# Patient Record
Sex: MALE | Race: WHITE | NOT HISPANIC OR LATINO | Employment: FULL TIME | URBAN - METROPOLITAN AREA
[De-identification: names, ages, dates, MRNs, and addresses within clinical notes are randomized per-mention and may not be internally consistent; named-entity substitution may affect disease eponyms.]

---

## 2017-01-03 ENCOUNTER — MYC REFILL (OUTPATIENT)
Dept: PALLIATIVE MEDICINE | Facility: CLINIC | Age: 42
End: 2017-01-03

## 2017-01-03 DIAGNOSIS — M62.830 BACK MUSCLE SPASM: ICD-10-CM

## 2017-01-06 RX ORDER — METHOCARBAMOL 500 MG/1
500-1000 TABLET, FILM COATED ORAL 3 TIMES DAILY PRN
Qty: 60 TABLET | Refills: 3 | Status: SHIPPED | OUTPATIENT
Start: 2017-01-06 | End: 2018-08-28

## 2017-01-06 NOTE — TELEPHONE ENCOUNTER
Last refilled on 10/10/2016 with 3 refills    Pt last seen on 11/30/2016 injection; 10/10/2016 OV  Next appt scheduled for NONE    Will facilitate refill.

## 2017-01-06 NOTE — TELEPHONE ENCOUNTER
Message from [employer]:  Original authorizing provider: Love Marte MD    Doug AILEEN Grayson would like a refill of the following medications:  methocarbamol (ROBAXIN) 500 MG tablet [Love Marte MD]    Preferred pharmacy: [id number] - [place], [place] - [address]    Comment:  Trying to be on safe side with lower back. I am back to trying to be in the gym 4 days a week, how ever if my back muscles start to act up....advil and such does zero to help chill them out. Hope holidays were amazing on your end TJ

## 2017-01-06 NOTE — TELEPHONE ENCOUNTER
Signed Prescriptions:                        Disp   Refills    methocarbamol (ROBAXIN) 500 MG tablet      60 tab*3        Sig: Take 1-2 tablets (500-1,000 mg) by mouth 3 times           daily as needed for muscle spasms  Authorizing Provider: JULIANE MARROQUIN MD  [facility]

## 2017-01-08 ENCOUNTER — MYC REFILL (OUTPATIENT)
Dept: PALLIATIVE MEDICINE | Facility: CLINIC | Age: 42
End: 2017-01-08

## 2017-01-08 DIAGNOSIS — M53.3 SI (SACROILIAC) JOINT DYSFUNCTION: ICD-10-CM

## 2017-01-09 NOTE — TELEPHONE ENCOUNTER
Routed to the nursing pool and to the MA pool to process refill(s).    Karlie Sykes, RN-BSN  [facility]

## 2017-01-09 NOTE — TELEPHONE ENCOUNTER
Message from [employer]:  Original authorizing provider: MD Doug Lea would like a refill of the following medications:  traMADol (ULTRAM) 50 MG tablet [Love Marte MD]    Preferred pharmacy: [id number] - [place], [place] - [address] [address]    Comment:  As always being proactive.

## 2017-01-11 ENCOUNTER — MYC MEDICAL ADVICE (OUTPATIENT)
Dept: PALLIATIVE MEDICINE | Facility: CLINIC | Age: 42
End: 2017-01-11

## 2017-01-11 RX ORDER — TRAMADOL HYDROCHLORIDE 50 MG/1
50-100 TABLET ORAL EVERY 6 HOURS PRN
Qty: 120 TABLET | Refills: 0 | Status: SHIPPED | OUTPATIENT
Start: 2017-01-11 | End: 2017-02-07

## 2017-01-11 NOTE — TELEPHONE ENCOUNTER
Received call from patient requesting refill(s) of tramadol      Last picked up from pharmacy on 16  Last due:  Fill on/after 16 to start on 16  Due date 17    Pt last seen on 10/10/16  Next appt scheduled for none    Last urine drug screen none  Current opioid agreement on file No Date:     Processing (pick one):      Fax to [facility]     Karlie Sykes RN-BSN  [facility]

## 2017-01-11 NOTE — TELEPHONE ENCOUNTER
Faxed signed script for Tramadol to [facility]. RightFax confirmed. Informed patient via [employer] message.    Aditya Fernandes MA

## 2017-01-11 NOTE — TELEPHONE ENCOUNTER
See the 1/8/17 refill encounter for more information.    Karlie Sykes RN-BSN  [facility]

## 2017-01-11 NOTE — TELEPHONE ENCOUNTER
Cannot find if Dr. Johnston communicated with me if he will take over fills.  Will send separate staff message    Signed Prescriptions:                        Disp   Refills    traMADol (ULTRAM) 50 MG tablet             120 ta*0        Sig: Take 1-2 tablets ( mg) by mouth every 6 hours           as needed for moderate pain (max of 4 per day.)           30 day supply. Okay to fill now.  To start on           1/13/17  Authorizing Provider: JULIANE MARROQUIN MD  [facility]

## 2017-02-07 ENCOUNTER — MYC REFILL (OUTPATIENT)
Dept: PALLIATIVE MEDICINE | Facility: CLINIC | Age: 42
End: 2017-02-07

## 2017-02-07 DIAGNOSIS — M53.3 SI (SACROILIAC) JOINT DYSFUNCTION: ICD-10-CM

## 2017-02-07 RX ORDER — TRAMADOL HYDROCHLORIDE 50 MG/1
50-100 TABLET ORAL EVERY 6 HOURS PRN
Qty: 120 TABLET | Refills: 0 | Status: SHIPPED | OUTPATIENT
Start: 2017-02-07 | End: 2017-02-23

## 2017-02-07 NOTE — TELEPHONE ENCOUNTER
Received call from patient requesting refill(s) of traMADol (ULTRAM) 50 MG tablet    Last picked up from pharmacy on 1/12/17    Processing (pick one and delete the others):  Mail to [id number] - [place], [place] - [address] [address] [place]  [facility]

## 2017-02-07 NOTE — TELEPHONE ENCOUNTER
If i'm going to be taking over everything now on patient - need to be seen to establish pain contract in next month please notify - thanks. Ryan Johnston MD ok fax prescription

## 2017-02-07 NOTE — TELEPHONE ENCOUNTER
Medication refill information reviewed.     Last due:  Okay to fill now.  To start on 1/13/17    Due date:  2/12/17    Weaning instructions:  N/A    Prescriptions prepped for review.     Karlie Sykes RN-BSN  [facility]

## 2017-02-07 NOTE — TELEPHONE ENCOUNTER
Routed to the nursing pool and to the MA pool to process refill(s).    Karlie Sykes, RN-BSN  [facility]

## 2017-02-07 NOTE — TELEPHONE ENCOUNTER
Have discussed with Dr. Johnston about taking over prescribing.  Will send this to him to sign.    Pending Prescriptions:                       Disp   Refills    traMADol (ULTRAM) 50 MG tablet             120 ta*0        Sig: Take 1-2 tablets ( mg) by mouth every 6 hours           as needed for moderate pain (max of 4 per day.)           30 day supply. Dispense on/after 2/10/17. To           start on 2/12/17        Doug,  I am sending this refill to Dr. Johnston, as we will transition. I'll keep an eye on things to make sure he is around to sign. I am happy to see you if you have a new concern or need an injection, but he will do the routine prescribing after this.    Thanks!  Radha Marte MD  [facility]

## 2017-02-07 NOTE — TELEPHONE ENCOUNTER
Message from [employer]ria:  Original authorizing provider: Love Marte MD    Doug Galicia would like a refill of the following medications:  traMADol (ULTRAM) 50 MG tablet [Love Marte MD]    Preferred pharmacy: [id number] - [place], [place] - [address] [address]    Comment:  Waiting to see if I have any changes, low back area giving a small amount of issues. How ever It seems my RIGHT knee is having issues now. My left knee has a tear inside but has been mostly ok. I had a tear fixed like 7-9 years back, that knee is acting the way it did back then. That is a new issue in past few days. Just to keep my doc up to speed.

## 2017-02-23 ENCOUNTER — OFFICE VISIT (OUTPATIENT)
Dept: FAMILY MEDICINE | Facility: CLINIC | Age: 42
End: 2017-02-23
Payer: COMMERCIAL

## 2017-02-23 VITALS
SYSTOLIC BLOOD PRESSURE: 136 MMHG | TEMPERATURE: 98.4 F | WEIGHT: 233 LBS | HEART RATE: 113 BPM | OXYGEN SATURATION: 99 % | DIASTOLIC BLOOD PRESSURE: 86 MMHG | BODY MASS INDEX: 30.88 KG/M2 | HEIGHT: 73 IN

## 2017-02-23 DIAGNOSIS — G47.00 INSOMNIA, UNSPECIFIED TYPE: Chronic | ICD-10-CM

## 2017-02-23 DIAGNOSIS — G89.4 CHRONIC PAIN SYNDROME: ICD-10-CM

## 2017-02-23 DIAGNOSIS — F33.0 MAJOR DEPRESSIVE DISORDER, RECURRENT EPISODE, MILD (H): ICD-10-CM

## 2017-02-23 DIAGNOSIS — M53.3 SI (SACROILIAC) JOINT DYSFUNCTION: Primary | ICD-10-CM

## 2017-02-23 PROCEDURE — 99214 OFFICE O/P EST MOD 30 MIN: CPT | Performed by: FAMILY MEDICINE

## 2017-02-23 RX ORDER — TRAMADOL HYDROCHLORIDE 50 MG/1
50-100 TABLET ORAL EVERY 6 HOURS PRN
Qty: 120 TABLET | Refills: 5 | Status: SHIPPED | OUTPATIENT
Start: 2017-02-23 | End: 2017-08-15

## 2017-02-23 ASSESSMENT — ANXIETY QUESTIONNAIRES
2. NOT BEING ABLE TO STOP OR CONTROL WORRYING: NOT AT ALL
7. FEELING AFRAID AS IF SOMETHING AWFUL MIGHT HAPPEN: NOT AT ALL
1. FEELING NERVOUS, ANXIOUS, OR ON EDGE: NOT AT ALL
5. BEING SO RESTLESS THAT IT IS HARD TO SIT STILL: NOT AT ALL
6. BECOMING EASILY ANNOYED OR IRRITABLE: SEVERAL DAYS
GAD7 TOTAL SCORE: 2
3. WORRYING TOO MUCH ABOUT DIFFERENT THINGS: NOT AT ALL

## 2017-02-23 ASSESSMENT — PATIENT HEALTH QUESTIONNAIRE - PHQ9: 5. POOR APPETITE OR OVEREATING: SEVERAL DAYS

## 2017-02-23 NOTE — PROGRESS NOTES
"SUBJECTIVE:  Doug Galicia, a 41 year old male scheduled an appointment to discuss the following issues:  Follow-up chronic low back pain (was seeing pain specialist and getting tramadol). History insomnia and depression.   SIjoint shots helpful. Epidurals ok in past. Mainly SIjoint.   Doing p.t. Exercises. Ice and some heat. No bm/bladder changes. No fevers or chills. No leg weakness. Sleep ok ambien. No major snoring.   No regular ALCOHOL or illicit drugs.   . No SUICIAL IDEATION OR HOMOCIDAL IDEATION OR THEE. Emotionally ok. cymbalta helpful. Some caffeine. No nausea, vomiting or diarrhea.     Past Medical History   Diagnosis Date     NONSPECIFIC MEDICAL HISTORY      Toretts Syndrome       Past Surgical History   Procedure Laterality Date     Arthroscopy knee rt/lt       right     C nonspecific procedure       Testicular Surgery       Family History   Problem Relation Age of Onset     Neurologic Disorder Father      seizures     DIABETES No family hx of      Colon Cancer Mother       in , diagnosed early 50s     Coronary Artery Disease No family hx of        Social History   Substance Use Topics     Smoking status: Former Smoker     Smokeless tobacco: Current User     Types: Chew      Comment: \"years\" per pt. did not know date     Alcohol use No       ROS:    OBJECTIVE:  /86  Pulse 113  Temp 98.4  F (36.9  C) (Oral)  Ht 6' 1\" (1.854 m)  Wt 233 lb (105.7 kg)  SpO2 99%  BMI 30.74 kg/m2  EXAM:  GENERAL APPEARANCE: healthy, alert and no distress  NECK: no adenopathy, no asymmetry, masses, or scars and thyroid normal to palpation  RESP: lungs clear to auscultation - no rales, rhonchi or wheezes  CV: regular rates and rhythm, normal S1 S2, no S3 or S4 and no murmur, click or rub -  ABDOMEN:  soft, nontender, no HSM or masses and bowel sounds normal  MS: extremities normal- no gross deformities noted, no evidence of inflammation in joints, FROM in all extremities.  MS: bilateral SIjoint " tenderness  SKIN: no suspicious lesions or rashes  NEURO: Normal strength and tone, sensory exam grossly normal, mentation intact and speech normal  PSYCH: mentation appears normal and affect normal/bright  PSYCH: mildly anxious  ASSESSMENT / PLAN:  (M53.3) SI (sacroiliac) joint dysfunction  (primary encounter diagnosis)  Comment: stable  Plan: traMADol (ULTRAM) 50 MG tablet        Prn shot. Reveiwed risks and side effects of medication  Pain contract signed. Recheck in 6 months  Sooner if worse    (F33.0) Major depressive disorder, recurrent episode, mild (H)  Comment: stable  Plan: DEPRESSION ACTION PLAN (DAP)        Continue cymbalta. .Reveiwed risks and side effects of medication  If SUICIAL IDEATION OR HOMOCIDAL IDEATION OR THEE TO ER    (G47.00) Insomnia, unspecified type  Comment: stable  Plan: continue ambien and prn over the counter. Exercise.       Ryan Johnston

## 2017-02-23 NOTE — MR AVS SNAPSHOT
"              After Visit Summary   2/23/2017    Doug Galicia    MRN: [id number]           Patient Information     Date Of Birth          1975        Visit Information        Provider Department      2/23/2017 1:00 PM Ryan Johnston MD [facility]        Today's Diagnoses     SI (sacroiliac) joint dysfunction    -  1    Major depressive disorder, recurrent episode, mild (H)        Insomnia, unspecified type        Chronic pain syndrome           Follow-ups after your visit        Who to contact     If you have questions or need follow up information about today's clinic visit or your schedule please contact [facility] directly at [phone].  Normal or non-critical lab and imaging results will be communicated to you by [employer], letter or phone within 4 business days after the clinic has received the results. If you do not hear from us within 7 days, please contact the clinic through Vidy[employer] or phone. If you have a critical or abnormal lab result, we will notify you by phone as soon as possible.  Submit refill requests through [employer] or call your pharmacy and they will forward the refill request to us. Please allow 3 business days for your refill to be completed.          Additional Information About Your Visit        MyChart Information     [employer] gives you secure access to your electronic health record. If you see a primary care provider, you can also send messages to your care team and make appointments. If you have questions, please call your primary care clinic.  If you do not have a primary care provider, please call [phone] and they will assist you.        Care EveryWhere ID     This is your Care EveryWhere ID. This could be used by other organizations to access your [place] medical records  [id number]        Your Vitals Were     Pulse Temperature Height Pulse Oximetry BMI (Body Mass Index)       113 98.4  F (36.9  C) (Oral) 6' 1\" (1.854 m) 99% 30.74 kg/m2     "    Blood Pressure from Last 3 Encounters:   02/23/17 136/86   11/30/16 (!) 125/92   10/10/16 (!) 140/95    Weight from Last 3 Encounters:   02/23/17 233 lb (105.7 kg)   10/10/16 229 lb (103.9 kg)   09/16/16 230 lb (104.3 kg)              We Performed the Following     DEPRESSION ACTION PLAN (DAP)          Today's Medication Changes          These changes are accurate as of: 2/23/17  1:16 PM.  If you have any questions, ask your nurse or doctor.               These medicines have changed or have updated prescriptions.        Dose/Directions    traMADol 50 MG tablet   Commonly known as:  ULTRAM   This may have changed:  additional instructions   Used for:  SI (sacroiliac) joint dysfunction   Changed by:  Ryan Johnston MD        Dose:   mg   Take 1-2 tablets ( mg) by mouth every 6 hours as needed for moderate pain (max of 4 per day.) 30 day supply. Dispense on/after 3/10/17. Pharmacy ok to hold prescription until due   Quantity:  120 tablet   Refills:  5            Where to get your medicines      Some of these will need a paper prescription and others can be bought over the counter.  Ask your nurse if you have questions.     Bring a paper prescription for each of these medications     traMADol 50 MG tablet                Primary Care Provider Office Phone # Fax #    Ryan Johnston [phone]       [facility] [address] [facility] [address]        Thank you!     Thank you for choosing [facility]  for your care. Our goal is always to provide you with excellent care. Hearing back from our patients is one way we can continue to improve our services. Please take a few minutes to complete the written survey that you may receive in the mail after your visit with us. Thank you!             Your Updated Medication List - Protect others around you: Learn how to safely use, store and throw away your medicines at www.disposemymeds.org.          This list is  accurate as of: 2/23/17  1:16 PM.  Always use your most recent med list.                   Brand Name Dispense Instructions for use    DIPHENHYDRAMINE HCL PO      Take 50 mg by mouth nightly as needed 2-4 tablets       doxylamine 25 MG Tabs tablet    UNISOM     Take 25 mg by mouth At Bedtime 3 to 5 tablets  @ bedtime       DULoxetine 20 MG EC capsule    CYMBALTA    360 capsule    Take 2 capsules (40 mg) by mouth 2 times daily Pharmacy ok to hold prescription until due       methocarbamol 500 MG tablet    ROBAXIN    60 tablet    Take 1-2 tablets (500-1,000 mg) by mouth 3 times daily as needed for muscle spasms       traMADol 50 MG tablet    ULTRAM    120 tablet    Take 1-2 tablets ( mg) by mouth every 6 hours as needed for moderate pain (max of 4 per day.) 30 day supply. Dispense on/after 3/10/17. Pharmacy ok to hold prescription until due       zolpidem 10 MG tablet    AMBIEN    30 tablet    Take 1 tablet (10 mg) by mouth nightly as needed Pharmacy ok to hold prescription until due

## 2017-02-23 NOTE — NURSING NOTE
"Chief Complaint   Patient presents with     Medication Therapy Management       Initial /86  Pulse 113  Temp 98.4  F (36.9  C) (Oral)  Ht 6' 1\" (1.854 m)  Wt 233 lb (105.7 kg)  SpO2 99%  BMI 30.74 kg/m2 Estimated body mass index is 30.74 kg/(m^2) as calculated from the following:    Height as of this encounter: 6' 1\" (1.854 m).    Weight as of this encounter: 233 lb (105.7 kg).  Medication Reconciliation: complete   Jeimy Meade CMA      "

## 2017-02-23 NOTE — LETTER
[facility]    02/23/17    Patient: Doug Galicia  YOB: 1975  Medical Record Number: [id number]                                                                  Controlled Substance Agreement  I understand that my care provider has prescribed controlled substances (narcotics, tranquilizers, and/or stimulants) to help manage my condition(s).  I am taking this medicine to help me function or work.  I know that this is strong medicine.  It could have serious side effects and even cause a dependency on the drug.  If I stop these medicines suddenly, I could have severe withdrawal symptoms.    The risks, benefits, and side effects of these medication(s) were explained to me.  I agree that:  1. I will take part in other treatments as advised by my provider.  This may be psychiatry or counseling, physical therapy, behavioral therapy, group treatment, or a referral to a pain clinic.  I will reduce or stop my medicine when my provider tells me to do so.   2. I will take my medicines as prescribed.  I will not change the dose or schedule unless my provider tells me to.  There will be no refills if I  run out early.   I may be contacted at any time without warning and asked to complete a drug test or pill count.   3. I will keep all my appointments at the clinic.  If I miss appointments or fail to follow instructions, my provider may stop my medicine.  4. I will not ask other providers to prescribe controlled substances. And I will not accept controlled substances from other people. If I need another prescribed controlled substance for a new reason, I will notify my provider within one business day.  5. If I enroll in the [place] Medical Marijuana program, I will tell my provider.  I will also sign an agreement to share my medical records with my provider.  6. I will use one pharmacy to fill all of my controlled substance prescriptions.  If my prescription is mailed to my pharmacy, it may take 5 to  7 days for my medicine to be ready.  7. I understand that my provider, clinic care team, and pharmacy can track controlled substance prescriptions from other providers through a central database (prescription monitoring program).  8. I will bring in my list of medications (or my medicine bottles) each time I come to the clinic.  REV- 04/2016                                                                                                                                            Page 1 of 2      [facility]    02/23/17    Patient: Doug Galicia  YOB: 1975  Medical Record Number: [id number]    9. Refills of controlled substances will be made only during office hours.  It is up to me to make sure that I do not run out of my medicines on weekends or holidays.    10. I am responsible for my prescriptions.  If the medicine is lost or stolen, it will not be replaced.   I also agree not to share these medicines with anyone.  11. I agree to not use ANY illegal or recreational drugs.  This includes marijuana, cocaine, bath salts or other drugs.  I agree not to use alcohol unless my provider says I may.  I agree to give urine samples whenever asked.  If I fail to give a urine sample, the provider may stop my medicine.     12. I will tell my nurse or provider right away if I become pregnant or have a new medical problem treated outside of [facility].  13. I understand that this medicine can affect my thinking and judgment.  It may be unsafe for me to drive, use machinery and do dangerous tasks.  I will not do any of these things until I know how the medicine affects me.  If my dose changes, I will wait to see how it affects me.  I will contact my provider if I have concerns about medicine side effects.  I understand that if I do not follow any of the conditions above, my prescriptions or treatment may be stopped.    I agree that my provider, clinic care team, and pharmacy may work with any city,  state or federal law enforcement agency that investigates the misuse, sale, or other diversion of my controlled medicine. I will allow my provider to discuss my care with or share a copy of this agreement with any other treating provider, pharmacy or emergency room where I receive care.  I agree to give up (waive) any right of privacy or confidentiality with respect to these authorizations.   I have read this agreement and have asked questions about anything I did not understand.   ___________________________________    ___________________________  Patient Signature                                                           Date and Time  ___________________________________     ____________________________  Witness                                                                            Date and Time  ___________________________________  Ryan Johnston MD  REV-  04/2016                                                                                                                                                                 Page 2 of 2  Opioid Pain Medicines (also known as Narcotics)  What You Need to Know      What are opioids?   Opioids are pain medicines that must be prescribed by a doctor. Examples are:     morphine (MS Contin, Cece)    oxycodone (Oxycontin)    oxycodone and acetaminophen (Percocet)    hydrocodone and acetaminophen (Vicodin, Norco)     fentanyl patch (Duragesic)     hydromorphone (Dilaudid)     methadone     What do opioids do well?   Opioids are best for short-term pain after a surgery or injury. They also work well for cancer pain. Unlike other pain medicines, they do not cause liver or kidney failure or ulcers. They may help some people with long-lasting (chronic) pain.     What do opioids NOT do well?   Opioids never get rid of pain entirely, and they do not work well for most patients with chronic pain. Opioids do not reduce swelling, one of the causes of pain. They also don t work  well for nerve pain.     Side effects  Talk to your doctor before you start or decide to keep taking one of these medicines. Side effects include:    Lowers your breathing rate enough that it could cause death    Death due to taking more than the prescribed dose    Serious lifelong opioid use      Dependence is not the same as addiction. Addiction is when people keep using a substance that harms their body, their mind or their relations with others. If you have a history of drug or alcohol abuse, taking opioids can cause a relapse.  Over time, opioids don t work as well. Most people will need higher and higher doses. The higher the dose, the more serious the side effects. We don t know the long-term effects of opioids.   People who have used opioids for a long time have a lower quality of life, worse depression, higher levels of pain and more visits to doctors.  Overdose from prescription drugs is the second leading cause of death in the [place] The risk of overdose rises when opioids are taken with other drugs such as:    Medicines used for anxiety and panic attacks (such as lorazepam, alprazolam, clonazepam    Other sedatives    Alcohol    Illegal drugs such as heroin  Never share your opioids with others. Be sure to store opioids in a secure place, locked if possible.Young children can easily swallow them and overdose.     Are there other ways to manage pain?  Ways to help reduce pain:    Exercise every day.    Treat health problems that may be causing pain.    Treat mental health problems like depression and anxiety.     Worse depression symptoms; Less pleasure in things you usually enjoy    Feeling tired or sluggish    Slower thoughts or cloudy thinking    Being more sensitive to pain over time; Pain is harder to control.    Trouble sleeping or restless sleep    Changes in hormone levels (for example, less testosterone).     Changes in sex drive or ability to have sex    Long lasting nausea and  constipation    Trouble breathing while asleep; This is worse with lung problems like COPD or sleep apnea.    Unsafe driving    Getting sick more often    Itching    Feeling dizzy    Dry mouth    Sweating    Trouble emptying the bladder (peeing). This is worse if you have an enlarged prostate or get urinary tract infections (UTIs).    What else should I know about opioids?  When someone takes opioids for too long or too often, they become dependent. This means that if you stop or reduce the medicine too quickly, you will have withdrawal symptoms.          Practice good sleep habits.  Try to go to bed and get up at the same time every day.    Stop smoking.  Tobacco use can make pain worse.    Do things that you enjoy.    Find a way to work through pain without drugs.  Try deep breathing, meditation, visual imagery and aromatherapy.    Ask your doctor to help you create a plan to manage your pain.

## 2017-02-24 ASSESSMENT — PATIENT HEALTH QUESTIONNAIRE - PHQ9: SUM OF ALL RESPONSES TO PHQ QUESTIONS 1-9: 3

## 2017-02-24 ASSESSMENT — ANXIETY QUESTIONNAIRES: GAD7 TOTAL SCORE: 2

## 2017-06-22 ENCOUNTER — OFFICE VISIT (OUTPATIENT)
Dept: FAMILY MEDICINE | Facility: CLINIC | Age: 42
End: 2017-06-22
Payer: COMMERCIAL

## 2017-06-22 VITALS
WEIGHT: 230 LBS | DIASTOLIC BLOOD PRESSURE: 85 MMHG | TEMPERATURE: 98.7 F | SYSTOLIC BLOOD PRESSURE: 135 MMHG | HEART RATE: 104 BPM | BODY MASS INDEX: 30.34 KG/M2 | OXYGEN SATURATION: 98 %

## 2017-06-22 DIAGNOSIS — F95.9 FACIAL TIC: Primary | ICD-10-CM

## 2017-06-22 DIAGNOSIS — F51.04 INSOMNIA, PSYCHOPHYSIOLOGICAL: ICD-10-CM

## 2017-06-22 DIAGNOSIS — F33.0 MAJOR DEPRESSIVE DISORDER, RECURRENT EPISODE, MILD (H): ICD-10-CM

## 2017-06-22 PROCEDURE — 99214 OFFICE O/P EST MOD 30 MIN: CPT | Performed by: FAMILY MEDICINE

## 2017-06-22 RX ORDER — MULTIPLE VITAMINS W/ MINERALS TAB 9MG-400MCG
1 TAB ORAL DAILY
COMMUNITY

## 2017-06-22 NOTE — PROGRESS NOTES
SUBJECTIVE:  Doug Galicia, a 41 year old male scheduled an appointment to discuss the following issues:  Facial tick issues - on/off since youth but bad past couple month. Eye twitching right and nose twitching a lot. No tremor. No speak changes. Occasionally headaches. No head injury. No family history tics but dad with well controlled seizures.   Running a little hot. No excessive sweating. No diarrhea. No rashes. No pop/coffee. No ALCOHOL. Over the counter cortisol pill TID.   Water intake a lot. Sleep improving melatonin/ambien.    No acute med changes - same meds in past year - doesn't think from current.     Medical, social, surgical, and family histories reviewed.    ROS:    OBJECTIVE:  /85  Pulse 104  Temp 98.7  F (37.1  C) (Oral)  Wt 230 lb (104.3 kg)  SpO2 98%  BMI 30.34 kg/m2  EXAM:  GENERAL APPEARANCE: healthy, alert and no distress  EYES: EOMI,  PERRL  HENT: ear canals and TM's normal and nose and mouth without ulcers or lesions  NECK: no adenopathy, no asymmetry, masses, or scars and thyroid normal to palpation  RESP: lungs clear to auscultation - no rales, rhonchi or wheezes  CV: regular rates and rhythm, normal S1 S2, no S3 or S4 and no murmur, click or rub -  ABDOMEN:  soft, nontender, no HSM or masses and bowel sounds normal  MS: extremities normal- no gross deformities noted, no evidence of inflammation in joints, FROM in all extremities.  SKIN: no suspicious lesions or rashes  NEURO: Normal strength and tone, sensory exam grossly normal, mentation intact and speech normal. Normal gait. No finger-nose-finger. No tremor. Normal reflexes.   NEURO: frequent twitching nose and blinking right eye, CN2-12 grossly intact  PSYCH: mentation appears normal and affect normal/bright    ASSESSMENT / PLAN:  (F95.9) Facial tic  (primary encounter diagnosis)  Comment: chronic issues but worse past couple weeks. Unclear if from supplements or sleep or regular meds.   Plan: NEUROLOGY ADULT REFERRAL         Patient is taking a LOT of supplement and many pills daily. Advised to cut supplement down by 1/3 current dosages and follow-up neurology for second opinion if persists. Consider lower cymbalta dosage too? Expected course and warning signs reviewed. Call/email with questions/concerns. Call/email with questions/concerns - or new symptoms.     (F33.0) Major depressive disorder, recurrent episode, mild (H)  Comment: stable  Plan: continue cymbalta and exercise. If SUICIAL IDEATION OR HOMOCIDAL IDEATION OR THEE TO ER. Recheck in 6 months  Sooner if worse    (F51.04) Insomnia, psychophysiological  Comment: stable  Plan: patient taking 10mg melatonin which advised likely too high but not sure affecting tic. Continue ambien and 1/2 -10mg pill melatonin= 5mg.     Ryan Johnston MD

## 2017-06-22 NOTE — NURSING NOTE
"Chief Complaint   Patient presents with     Consult       Initial /85  Pulse 104  Temp 98.7  F (37.1  C) (Oral)  Wt 230 lb (104.3 kg)  SpO2 98%  BMI 30.34 kg/m2 Estimated body mass index is 30.34 kg/(m^2) as calculated from the following:    Height as of 2/23/17: 6' 1\" (1.854 m).    Weight as of this encounter: 230 lb (104.3 kg).  Medication Reconciliation: complete   Jeimy Meade CMA    "

## 2017-06-22 NOTE — MR AVS SNAPSHOT
After Visit Summary   6/22/2017    Doug Galicia    MRN: [id number]           Patient Information     Date Of Birth          1975        Visit Information        Provider Department      6/22/2017 1:20 PM Ryan Johnston MD [facility]        Today's Diagnoses     Facial tic    -  1    Major depressive disorder, recurrent episode, mild (H)        Insomnia, psychophysiological           Follow-ups after your visit        Additional Services     NEUROLOGY ADULT REFERRAL       Your provider has referred you to: [facility]: [facility] - [place]illa [place] [phone]   [email or web address]  [facility]: [facility] [phone]   [email or web address]    Reason for Referral: Consult    Please be aware that coverage of these services is subject to the terms and limitations of your health insurance plan.  Call member services at your health plan with any benefit or coverage questions.      Please bring the following with you to your appointment:    (1) Any X-Rays, CTs or MRIs which have been performed.  Contact the facility where they were done to arrange for  prior to your scheduled appointment.    (2) List of current medications  (3) This referral request   (4) Any documents/labs given to you for this referral                  Who to contact     If you have questions or need follow up information about today's clinic visit or your schedule please contact [facility] directly at [phone].  Normal or non-critical lab and imaging results will be communicated to you by MyChart, letter or phone within 4 business days after the clinic has received the results. If you do not hear from us within 7 days, please contact the clinic through MyChart or phone. If you have a critical or abnormal lab result, we will notify you by phone as soon as possible.  Submit refill requests through [employer] or call your  pharmacy and they will forward the refill request to us. Please allow 3 business days for your refill to be completed.          Additional Information About Your Visit        MyChart Information     [employer] gives you secure access to your electronic health record. If you see a primary care provider, you can also send messages to your care team and make appointments. If you have questions, please call your primary care clinic.  If you do not have a primary care provider, please call [phone] and they will assist you.        Care EveryWhere ID     This is your Care EveryWhere ID. This could be used by other organizations to access your [facility] medical records  [id number]        Your Vitals Were     Pulse Temperature Pulse Oximetry BMI (Body Mass Index)          104 98.7  F (37.1  C) (Oral) 98% 30.34 kg/m2         Blood Pressure from Last 3 Encounters:   06/22/17 135/85   02/23/17 136/86   11/30/16 (!) 125/92    Weight from Last 3 Encounters:   06/22/17 230 lb (104.3 kg)   02/23/17 233 lb (105.7 kg)   10/10/16 229 lb (103.9 kg)              We Performed the Following     NEUROLOGY ADULT REFERRAL        Primary Care Provider Office Phone # Fax #    Ryan Johnston [phone]       [facility] [address] [facility] [address]        Equal Access to Services     FROYLAN BAKER : Hadii aad ku hadasho Soomaali, waaxda luqadaha, qaybta kaalmada adeegyaaustin, kaya mac. So [facility] [phone].    ATENCIÓN: Si habla español, tiene a forrester disposición servicios gratuitos de asistencia lingüística. Llame al [phone].    We comply with applicable federal civil rights laws and [place] laws. We do not discriminate on the basis of race, color, national origin, age, disability sex, sexual orientation or gender identity.            Thank you!     Thank you for choosing [facility]  for your care. Our goal is always to provide you with excellent care.  Hearing back from our patients is one way we can continue to improve our services. Please take a few minutes to complete the written survey that you may receive in the mail after your visit with us. Thank you!             Your Updated Medication List - Protect others around you: Learn how to safely use, store and throw away your medicines at www.disposemymeds.org.          This list is accurate as of: 6/22/17  1:51 PM.  Always use your most recent med list.                   Brand Name Dispense Instructions for use Diagnosis    CORTISOL PO      1 tablet tid        DIPHENHYDRAMINE HCL PO      Take 50 mg by mouth nightly as needed 2-4 tablets        DULoxetine 20 MG EC capsule    CYMBALTA    360 capsule    Take 2 capsules (40 mg) by mouth 2 times daily    Chronic low back pain with sciatica, sciatica laterality unspecified, unspecified back pain laterality, Anxiety       MELATONIN PO      Take by mouth At Bedtime 10 mg        methocarbamol 500 MG tablet    ROBAXIN    60 tablet    Take 1-2 tablets (500-1,000 mg) by mouth 3 times daily as needed for muscle spasms    Back muscle spasm       Multi-vitamin Tabs tablet      Take 1 tablet by mouth daily        OMEPRAZOLE PO      Take 20 mg by mouth every morning        traMADol 50 MG tablet    ULTRAM    120 tablet    Take 1-2 tablets ( mg) by mouth every 6 hours as needed for moderate pain (max of 4 per day.) 30 day supply. Dispense on/after 3/10/17. Pharmacy ok to hold prescription until due    SI (sacroiliac) joint dysfunction       * UNABLE TO FIND      MEDICATION NAME: alpha Jym /testosterone natural 3 tablets twice daily        * UNABLE TO FIND      MEDICATION NAME: Shred JYM-fat burner 6 caplets daily        zolpidem 10 MG tablet    AMBIEN    30 tablet    Take 1 tablet (10 mg) by mouth nightly as needed Pharmacy ok to hold prescription until due    Other insomnia       * Notice:  This list has 2 medication(s) that are the same as other medications prescribed for  you. Read the directions carefully, and ask your doctor or other care provider to review them with you.

## 2017-07-28 ENCOUNTER — MYC MEDICAL ADVICE (OUTPATIENT)
Dept: FAMILY MEDICINE | Facility: CLINIC | Age: 42
End: 2017-07-28

## 2017-08-14 ENCOUNTER — MYC MEDICAL ADVICE (OUTPATIENT)
Dept: PALLIATIVE MEDICINE | Facility: CLINIC | Age: 42
End: 2017-08-14

## 2017-08-14 DIAGNOSIS — M53.3 SI (SACROILIAC) JOINT DYSFUNCTION: Primary | ICD-10-CM

## 2017-08-14 NOTE — TELEPHONE ENCOUNTER
Trevor from patient    I'm getting very close to needing my SI joint shots again. Do I need to see Dr. Johnston first? Or with my long history of having it done do I just set it up? If its the second can they be ordered please?

## 2017-08-14 NOTE — TELEPHONE ENCOUNTER
Dr. Johnston,   If ok, please sign pended order for SI joint injections.    Radha Marte MD  [facility]    I think you could just reach out to Dr. Johnston's office for getting the order.  I will help facilitate this one.  I will send a note to Dr. Johnston.    Radha Marte MD  [facility]

## 2017-08-15 ENCOUNTER — TELEPHONE (OUTPATIENT)
Dept: FAMILY MEDICINE | Facility: CLINIC | Age: 42
End: 2017-08-15

## 2017-08-15 NOTE — TELEPHONE ENCOUNTER
Patient states he received some mail and would like to clear some things up. Please call as soon as possible.    Thanks.

## 2017-08-15 NOTE — TELEPHONE ENCOUNTER
Pre-screening Questions for Radiology Injections:    Injection to be done at which interventional clinic site? [facility] [place]    Procedure ordered by Dr. Johnston    Procedure ordered? Bilateral SI Joint Injection    What insurance would patient like us to bill for this procedure? [employer]      Worker's comp-Any injection DO NOT SCHEDULE and route to Lorena Ribera.      [employer] insurance - For SI joint injections, DO NOT SCHEDULE and route Tami Mccord.      [employer]- MBB's must be scheduled at LEAST two weeks apart      [employer] - Any injection besides hip/shoulder/knee joint DO NOT SCHEDULE and route to Tami Mccord. She will obtain PA and call pt back to schedule procedure or notify pt of denial.     HP [employer]-PA REQUIRED FOR NON-JAIME OR Joint injections    Any chance of pregnancy? Not Applicable   If YES, do NOT schedule and route to RN pool    Is an  needed? No     Patient has a drive home? (mandatory) YES: INFORMED    Is patient taking any blood thinners (plavix, coumadin, jantoven, warfarin, heparin, pradaxa or dabigatran )? No   If hold needed, do NOT schedule, route to RN pool     Is patient taking any aspirin products? No     If more than 325mg/day do NOT schedule; route to RN pool     For CERVICAL procedures, hold all aspirin products for 6 days.      Does the patient have a bleeding or clotting disorder? No     If yes, okay to schedule AND route to RN nurse pool    **For any patients with platelet count <100, must be forwarded to provider**    Is patient diabetic?  No  If YES, have them bring their glucometer.    Does patient have an active infection or treated for one within the past week? No     Is patient currently taking any antibiotics?  No     For patients on chronic, preventative, or prophylactic antibiotics, procedures may be scheduled.     For patients on antibiotics for active or recent infection:    Monica Holt, Rosanna Kan,  Mor-antibiotic course must have been completed for 4 days    Monica Gonzalez-antibiotic course must have been completed for 7 days    Is patient currently taking any steroid medications? (i.e. Prednisone, Medrol)  No     For patients on steroid medications:    Lucius Quach [place], Burton-steroid course must have been completed for 4 days    Monica Gonzalez-steroid course must have been completed for 7 days    Reviewed with patient:  If you are started on any steroids or antibiotics between now and your appointment, you must contact us because it may affect our ability to perform your procedure.  Yes    Is patient actively being treated for cancer or immunocompromised, including the spleen having been removed? No    If YES, do NOT schedule and route to RN pool     **For Dr. Gandara patients without spleens should have the chart sent to her**    Are you able to get on and off an exam table with minimal or no assistance? Yes  If NO, do NOT schedule and route to RN pool    Are you able to roll over and lay on your stomach with minimal or no assistance? Yes  If NO, do NOT schedule and route to RN pool     Any allergies to contrast dye, iodine, shellfish, or numbing and steroid medications? No  If YES, route to RN pool AND add allergy information to appointment notes    Allergies: Penicillins        Has the patient had a flu shot or any other vaccinations within 7 days before or after the procedure.  No       Does patient have an MRI/CT?  Not Applicable  (SI joint, hip injections, lumbar sympathetic blocks, and stellate ganglion blocks do not require an MRI)    Was the MRI done w/in the last 3 years?  NA    Was MRI done at [place]? No      If not, where was it done? N/A       If MRI was not done at [facility], [facility] or [facility] do NOT schedule and route to nursing.  If pt has an imaging disc, the injection may be scheduled but pt has to bring disc to appt. If they show up w/out disc the  injection cannot be done    Reminders (please tell patient if applicable):       Instructed pt to arrive 30 minutes early for IV start if this is for a cervical procedure, ALL sympathetic (stellate ganglion, hypogastric, or lumbar sympathetic block) and all sedation procedures (RFA, spinal cord stimulation trials).  Not Applicable    -IVs are not routinely placed for Kan and [place] cervical case       If NPO for sedation, informed patient that it is okay to take medications with sips of water (except if they are to hold blood thinners).  Not Applicable   *DO take blood pressure medication if it is prescribed*      If this is for a cervical JAIME, informed patient that aspirin needs to be held for 6 days.   Not Applicable      For all patients not having spinal cord stimulator (SCS) trials or radiofrequency ablations (RFAs), informed patient:  IV sedation is not provided for this procedure.  If you feel that an oral anti-anxiety medication is needed, you can discuss this further with your referring provider or primary care provider.  The Pain Clinic provider will discuss specifics of what the procedure includes at your appointment.  Most procedures last 10-20 minutes.  We use numbing medications to help with any discomfort during the procedure.  Not Applicable      Do not schedule procedures requiring IV placement in the first appointment of the day or first appointment after lunch. N/A      For patients 85 or older we recommend having an adult stay w/ them for the remainder of the day.   N/A    Does the patient have any questions?  NO  Holley Yun  [facility]

## 2017-08-15 NOTE — TELEPHONE ENCOUNTER
My Chart from patient    Copy that thank you. My fault I did not remember to say his team told me to contact you. So they could also work in a manner you wished.     Thanks again   TJ

## 2017-08-24 ENCOUNTER — TELEPHONE (OUTPATIENT)
Dept: FAMILY MEDICINE | Facility: CLINIC | Age: 42
End: 2017-08-24

## 2017-08-24 NOTE — TELEPHONE ENCOUNTER
Reason for Call:  Form, our goal is to have forms completed with 72 hours, however, some forms may require a visit or additional information.    Type of letter, form or note:  FMLA    Who is the form from?: Patient    Where did the form come from: Patient or family brought in       What clinic location was the form placed at?: [place]    Where the form was placed: 's Box    What number is listed as a contact on the form?: [phone]       Additional comments: pt had said that forms were supposed to be faxed but did not provide a fax number. Please call if you to advise/questions. Thank you.     Call taken on 8/24/2017 at 3:57 PM by Kassy Hung

## 2017-08-29 NOTE — TELEPHONE ENCOUNTER
Called and informed patient that forms were completed and placed at the  to be picked up.Lindsay Valentine MA/TC

## 2017-08-29 NOTE — TELEPHONE ENCOUNTER
Patient called back. He said that it is for his low back pain. He said you filled this out last year for him and it just needs to be redone every year. It looks like you filled out [place] papers for him-they are scanned in on 3/30/16. If he still needs to come in he will, but is asking since you have done this before if you can do it without an appoinytment.Lindsay Valentine MA/TC

## 2017-09-20 ENCOUNTER — RADIANT APPOINTMENT (OUTPATIENT)
Dept: RADIOLOGY | Facility: CLINIC | Age: 42
End: 2017-09-20
Attending: PSYCHIATRY & NEUROLOGY

## 2017-09-20 ENCOUNTER — RADIOLOGY INJECTION OFFICE VISIT (OUTPATIENT)
Dept: PALLIATIVE MEDICINE | Facility: CLINIC | Age: 42
End: 2017-09-20
Payer: COMMERCIAL

## 2017-09-20 VITALS — HEART RATE: 99 BPM | SYSTOLIC BLOOD PRESSURE: 120 MMHG | OXYGEN SATURATION: 83 % | DIASTOLIC BLOOD PRESSURE: 59 MMHG

## 2017-09-20 DIAGNOSIS — M53.3 SI (SACROILIAC) JOINT DYSFUNCTION: ICD-10-CM

## 2017-09-20 DIAGNOSIS — M53.3 SI (SACROILIAC) JOINT DYSFUNCTION: Primary | ICD-10-CM

## 2017-09-20 PROCEDURE — 27096 INJECT SACROILIAC JOINT: CPT | Mod: 50 | Performed by: PSYCHIATRY & NEUROLOGY

## 2017-09-20 ASSESSMENT — PAIN SCALES - GENERAL: PAINLEVEL: SEVERE PAIN (7)

## 2017-09-20 NOTE — PROGRESS NOTES
Pre procedure Diagnosis: SI joint dysfunction    Post procedure Diagnosis: Same  Procedure performed: bilateral SI joint injections  Anesthesia: none  Complications: none  Operators: Radha Marte MD and Samir Lopez DO    Indications:   Doug Galicia is a 41 year old male seen by me for injections, sent by Dr. Busby. They have a history of low back and buttock pain.  Exam shows bilateral SI joint tenderness and they have tried conservative treatment including medications, physical therapy, home-exercise, and previous injections.      Options/alternatives, benefits and risks were discussed with the patient including bleeding, infection, no pain relief, tissue trauma, exposure to radiation, reaction to medications including seizure, spinal cord injury, dural puncture, weakness, numbness and headache.  Questions were answered to his satisfaction and he agrees to proceed. Voluntary informed consent was obtained and signed.     Vitals were reviewed: Yes  Allergies were reviewed:  Yes   Medications were reviewed:  Yes   Pre-procedure pain score: 7/10    Procedure:  After getting informed consent, patient was brought into the procedure suite and was placed in a prone position on the procedure table.   A Pause for the Cause was performed.  Patient was prepped and draped in sterile fashion.     After identifying the bilateral SI joint, the C-arm was rotated to a obliquely to obtain the best view of the inferior angle of the joint.  A total of 4 ml of Lidocaine 1%  was used to anesthetize the skin at a skin entry site coaxial with the fluoroscopy beam at this location.  A 22gauge 3.5 inch needle was advanced under intermittent fluoroscopy until it was felt to enter the SI joint    A total of 2ml of Omnipaque-300 was injected, confirming appropriate position, with spread into the intraarticular space, with no intravascular uptake noted.  8ml was wasted. Location was verified in lateral view.      3 ml of 0.2%  ropivacaine with 80mg of kenalog was injected, divided between the two sides.  The needle was flushed with lidocaine and removed.    During the procedure, there was not paresthesia.     Hemostasis was achieved, the area was cleaned, and bandaids were placed when appropriate.  The patient tolerated the procedure well, and was taken to the recovery room.    Images were saved to PACS.    Post-procedure pain score: 0/10  Follow-up includes:   -f/u phone call in one week  -f/u with Dr. Rosanna Marte MD  [facility]

## 2017-09-20 NOTE — NURSING NOTE
Discharge Information    IV Discontiued Time:  NA    Amount of Fluid Infused:  NA    Discharge Criteria = When patient returns to baseline or as per MD order    Consciousness:  Pt is fully awake    Circulation:  BP +/- 20% of pre-procedure level    Respiration:  Patient is able to breathe deeply    O2 Sat:  Patient is able to maintain O2 Sat >92% on room air    Activity:  Moves 4 extremities on command    Ambulation:  Patient is able to stand and walk or stand and pivot into wheelchair    Dressing:  Clean/dry or No Dressing    Notes:   Discharge instructions and AVS given to patient    Patient meets criteria for discharge?  YES    Admitted to PCU?  No    Responsible adult present to accompany patient home?  Yes    Signature/Title:    neo gentile RN Care Coordinator  [facility]

## 2017-09-20 NOTE — PATIENT INSTRUCTIONS
[facility]   Procedure Discharge Instructions    Today you saw:  Dr. Love Marte    You had an:  bilateral sacro-iliac joint injection       Medications used:  Lidocaine     Omnipaque  Ropivicaine   Kenalog             Be cautious when walking. Numbness and/or weakness in the lower extremities may occur for up to 6-8 hours after the procedure due to effect of the local anesthetic    Do not drive for 6 hours. The effect of the local anesthetic could slow your reflexes.     You may resume your regular activities after 24 hours    Avoid strenuous activity for the first 24 hours    You may shower, however avoid swimming, tub baths or hot tubs for 24 hours following your procedure    You may have a mild to moderate increase in pain for several days following the injection.    It may take up to 14 days for the steroid medication to start working although you may feel the effect as early as a few days after the procedure.       You may use ice packs for 10-15 minutes, 3 to 4 times a day at the injection site for comfort    Do not use heat to painful areas for 6 to 8 hours. This will give the local anesthetic time to wear off and prevent you from accidentally burning your skin.     You may use anti-inflammatory medications (such as Ibuprofen or Aleve or Advil) or Tylenol for pain control if necessary    If you were fasting, you may resume your normal diet and medications after the procedure    If you have diabetes, check your blood sugar more frequently than usual as your blood sugar may be higher than normal for 10-14 days following a steroid injection. Contact your doctor who manages your diabetes if your blood sugar is higher than usual    If you experience any of the following, call the pain center nursing line during work hours at [phone] or the after hours provider line at [phone]:  -Fever over 100 degree F  -Swelling, bleeding, redness, drainage, warmth at the injection  site  -Progressive weakness or numbness in your legs or arms  -Loss of bowel or bladder function  -Unusual headache that is not relieved by Tylenol  -Unusual new onset of pain that is not improving      Phone #s:  Appointment line: [phone];  Nurse line: [phone]

## 2017-09-20 NOTE — NURSING NOTE
"Chief Complaint   Patient presents with     Pain       Initial /82  Pulse 85 Estimated body mass index is 30.34 kg/(m^2) as calculated from the following:    Height as of 2/23/17: 1.854 m (6' 1\").    Weight as of 6/22/17: 104.3 kg (230 lb).  Medication Reconciliation: complete     Injection intake:    If this procedure is requiring IV sedation has patient been NPO for 6  Hours? NA    Is patient on coumadin, plavix or other prescribed blood thinner?   No    If patient is on coumadin was it held for 5 days?   No     If patient is on plavix was it held for 7 days?    No      Does patient take aspirin?  No    If this is for a cervical procedure and patient is on aspirin has it been held for 6 days?   No     Any allergies to contrast dye, iodine, steroid and/or numbing medications?  NO    Is patient currently taking antibiotics or have an active infection?  NO    Does patient have a ? Yes       Is patient pregnant or breastfeeding?  Not Applicable    Are the vital signs normal?  Yes    Aditya Fernandes MA      "

## 2017-09-26 ENCOUNTER — MYC REFILL (OUTPATIENT)
Dept: FAMILY MEDICINE | Facility: CLINIC | Age: 42
End: 2017-09-26

## 2017-09-26 DIAGNOSIS — M54.40 CHRONIC LOW BACK PAIN WITH SCIATICA, SCIATICA LATERALITY UNSPECIFIED, UNSPECIFIED BACK PAIN LATERALITY: ICD-10-CM

## 2017-09-26 DIAGNOSIS — G89.29 CHRONIC LOW BACK PAIN WITH SCIATICA, SCIATICA LATERALITY UNSPECIFIED, UNSPECIFIED BACK PAIN LATERALITY: ICD-10-CM

## 2017-09-26 DIAGNOSIS — F41.9 ANXIETY: ICD-10-CM

## 2017-09-26 RX ORDER — DULOXETIN HYDROCHLORIDE 20 MG/1
40 CAPSULE, DELAYED RELEASE ORAL 2 TIMES DAILY
Qty: 360 CAPSULE | Refills: 1 | Status: SHIPPED | OUTPATIENT
Start: 2017-09-26 | End: 2018-02-13

## 2017-09-26 NOTE — TELEPHONE ENCOUNTER
Message from [employer]:  Original authorizing provider: MD Abdias Martin would like a refill of the following medications:  DULoxetine (CYMBALTA) 20 MG EC capsule [Ryan Johnston MD]    Preferred pharmacy: [id number] - [place], [place] - [address] [address]    Comment:  Have about a week and a half left.

## 2017-09-27 ENCOUNTER — TELEPHONE (OUTPATIENT)
Dept: RADIOLOGY | Facility: CLINIC | Age: 42
End: 2017-09-27

## 2017-09-27 NOTE — TELEPHONE ENCOUNTER
Patient had a bilateral SI joint injections on 9/20/17.  Called patient for an update.      Left message that we were calling for an update about how s/he was doing after the injection.  LM that if s/he has any problems or questions to call the nurse line at [phone].

## 2017-10-11 ENCOUNTER — TELEPHONE (OUTPATIENT)
Dept: FAMILY MEDICINE | Facility: CLINIC | Age: 42
End: 2017-10-11

## 2017-10-11 NOTE — TELEPHONE ENCOUNTER
Dr. Paty Samson from [facility] is calling regarding the [place] paperwork  submitted on behalf of the patient. She has a few questions on that paperwork and would like a call back please. Thank you. Just as a reminder- she [place] (so two hour behind us). Please call to advise. Thank you

## 2017-10-12 NOTE — TELEPHONE ENCOUNTER
Called and spoke to Dr Samson. She has questions about questions # 11 and # 12.    # 11-she wants to know how long hours/days for each flare up last and will be out.(I printed form and highlighted where this question is)    # 12-They want to know if in addition to the 3 times a year for appointments, that could be for flare ups, that he will have other appointments for routine follow up-(also highlighted on the form)    Placed in your basket to review    Lindsay Valentine MA/LIVIA

## 2017-10-26 ENCOUNTER — MEDICAL CORRESPONDENCE (OUTPATIENT)
Dept: HEALTH INFORMATION MANAGEMENT | Facility: CLINIC | Age: 42
End: 2017-10-26

## 2017-11-06 ENCOUNTER — OFFICE VISIT (OUTPATIENT)
Dept: FAMILY MEDICINE | Facility: CLINIC | Age: 42
End: 2017-11-06
Payer: COMMERCIAL

## 2017-11-06 VITALS
TEMPERATURE: 99 F | WEIGHT: 230 LBS | SYSTOLIC BLOOD PRESSURE: 110 MMHG | OXYGEN SATURATION: 100 % | DIASTOLIC BLOOD PRESSURE: 86 MMHG | HEART RATE: 107 BPM | BODY MASS INDEX: 30.34 KG/M2

## 2017-11-06 DIAGNOSIS — F51.04 INSOMNIA, PSYCHOPHYSIOLOGICAL: ICD-10-CM

## 2017-11-06 DIAGNOSIS — F33.0 MAJOR DEPRESSIVE DISORDER, RECURRENT EPISODE, MILD (H): Primary | ICD-10-CM

## 2017-11-06 DIAGNOSIS — Z23 NEED FOR PROPHYLACTIC VACCINATION AND INOCULATION AGAINST INFLUENZA: ICD-10-CM

## 2017-11-06 DIAGNOSIS — F90.9 ATTENTION DEFICIT HYPERACTIVITY DISORDER (ADHD), UNSPECIFIED ADHD TYPE: ICD-10-CM

## 2017-11-06 PROCEDURE — 90686 IIV4 VACC NO PRSV 0.5 ML IM: CPT | Performed by: FAMILY MEDICINE

## 2017-11-06 PROCEDURE — 90471 IMMUNIZATION ADMIN: CPT | Performed by: FAMILY MEDICINE

## 2017-11-06 PROCEDURE — 99214 OFFICE O/P EST MOD 30 MIN: CPT | Mod: 25 | Performed by: FAMILY MEDICINE

## 2017-11-06 RX ORDER — DEXTROAMPHETAMINE SACCHARATE, AMPHETAMINE ASPARTATE MONOHYDRATE, DEXTROAMPHETAMINE SULFATE AND AMPHETAMINE SULFATE 5; 5; 5; 5 MG/1; MG/1; MG/1; MG/1
20 CAPSULE, EXTENDED RELEASE ORAL DAILY
Qty: 30 CAPSULE | Refills: 0 | Status: SHIPPED | OUTPATIENT
Start: 2017-11-06 | End: 2017-11-28

## 2017-11-06 ASSESSMENT — ANXIETY QUESTIONNAIRES
GAD7 TOTAL SCORE: 5
3. WORRYING TOO MUCH ABOUT DIFFERENT THINGS: NOT AT ALL
5. BEING SO RESTLESS THAT IT IS HARD TO SIT STILL: SEVERAL DAYS
6. BECOMING EASILY ANNOYED OR IRRITABLE: MORE THAN HALF THE DAYS
2. NOT BEING ABLE TO STOP OR CONTROL WORRYING: NOT AT ALL
1. FEELING NERVOUS, ANXIOUS, OR ON EDGE: SEVERAL DAYS
7. FEELING AFRAID AS IF SOMETHING AWFUL MIGHT HAPPEN: NOT AT ALL

## 2017-11-06 ASSESSMENT — PATIENT HEALTH QUESTIONNAIRE - PHQ9
SUM OF ALL RESPONSES TO PHQ QUESTIONS 1-9: 6
5. POOR APPETITE OR OVEREATING: SEVERAL DAYS

## 2017-11-06 NOTE — MR AVS SNAPSHOT
After Visit Summary   11/6/2017    Doug Galicia    MRN: [id number]           Patient Information     Date Of Birth          1975        Visit Information        Provider Department      11/6/2017 11:15 AM Ryan Johnston MD [facility]        Today's Diagnoses     Major depressive disorder, recurrent episode, mild (H)    -  1    Insomnia, psychophysiological        Attention deficit hyperactivity disorder (ADHD), unspecified ADHD type        Need for prophylactic vaccination and inoculation against influenza           Follow-ups after your visit        Who to contact     If you have questions or need follow up information about today's clinic visit or your schedule please contact [facility] directly at [phone].  Normal or non-critical lab and imaging results will be communicated to you by MyChart, letter or phone within 4 business days after the clinic has received the results. If you do not hear from us within 7 days, please contact the clinic through ROOOMER[employer]art or phone. If you have a critical or abnormal lab result, we will notify you by phone as soon as possible.  Submit refill requests through Lingoda or call your pharmacy and they will forward the refill request to us. Please allow 3 business days for your refill to be completed.          Additional Information About Your Visit        MyChart Information     Lingoda gives you secure access to your electronic health record. If you see a primary care provider, you can also send messages to your care team and make appointments. If you have questions, please call your primary care clinic.  If you do not have a primary care provider, please call [phone] and they will assist you.        Care EveryWhere ID     This is your Care EveryWhere ID. This could be used by other organizations to access your [facility] medical records  [id number]        Your Vitals Were     Pulse Temperature Pulse Oximetry BMI (Body  Mass Index)          107 99  F (37.2  C) (Oral) 100% 30.34 kg/m2         Blood Pressure from Last 3 Encounters:   11/06/17 110/86   09/20/17 120/59   06/22/17 135/85    Weight from Last 3 Encounters:   11/06/17 230 lb (104.3 kg)   06/22/17 230 lb (104.3 kg)   02/23/17 233 lb (105.7 kg)              We Performed the Following     DEPRESSION ACTION PLAN (DAP)     FLU VAC, SPLIT VIRUS IM > 3 YO (QUADRIVALENT) [95279]     Vaccine Administration, Initial [41382]          Today's Medication Changes          These changes are accurate as of: 11/6/17 11:48 AM.  If you have any questions, ask your nurse or doctor.               Start taking these medicines.        Dose/Directions    amphetamine-dextroamphetamine 20 MG per 24 hr capsule   Commonly known as:  ADDERALL XR   Used for:  Attention deficit hyperactivity disorder (ADHD), unspecified ADHD type   Started by:  Ryan Johnston MD        Dose:  20 mg   Take 1 capsule (20 mg) by mouth daily   Quantity:  30 capsule   Refills:  0            Where to get your medicines      Some of these will need a paper prescription and others can be bought over the counter.  Ask your nurse if you have questions.     Bring a paper prescription for each of these medications     amphetamine-dextroamphetamine 20 MG per 24 hr capsule                Primary Care Provider Office Phone # Fax #    Ryan Johnston [phone] [address] [address] [facility] [address]        Equal Access to Services     [facility]: Hadii brain ku hadasho Soomaali, waaxda luqadaha, qaybta kaalmada adeegyada, waxay lacie mac. So [facility] [phone].    ATENCIÓN: Si habla español, tiene a forrester disposición servicios gratuitos de asistencia lingüística. Llame al [phone].    We comply with applicable federal civil rights laws and [place] laws. We do not discriminate on the basis of race, color, national origin, age, disability, sex, sexual orientation, or gender  identity.            Thank you!     Thank you for choosing [facility]  for your care. Our goal is always to provide you with excellent care. Hearing back from our patients is one way we can continue to improve our services. Please take a few minutes to complete the written survey that you may receive in the mail after your visit with us. Thank you!             Your Updated Medication List - Protect others around you: Learn how to safely use, store and throw away your medicines at www.disposemymeds.org.          This list is accurate as of: 11/6/17 11:48 AM.  Always use your most recent med list.                   Brand Name Dispense Instructions for use Diagnosis    amphetamine-dextroamphetamine 20 MG per 24 hr capsule    ADDERALL XR    30 capsule    Take 1 capsule (20 mg) by mouth daily    Attention deficit hyperactivity disorder (ADHD), unspecified ADHD type       DIPHENHYDRAMINE HCL PO      Take 50 mg by mouth nightly as needed        DULoxetine 20 MG EC capsule    CYMBALTA    360 capsule    Take 2 capsules (40 mg) by mouth 2 times daily    Chronic low back pain with sciatica, sciatica laterality unspecified, unspecified back pain laterality, Anxiety       MELATONIN PO      Take by mouth At Bedtime 6 mg        methocarbamol 500 MG tablet    ROBAXIN    60 tablet    Take 1-2 tablets (500-1,000 mg) by mouth 3 times daily as needed for muscle spasms    Back muscle spasm       Multi-vitamin Tabs tablet      Take 1 tablet by mouth daily        OMEPRAZOLE PO      Take 20 mg by mouth every morning        traMADol 50 MG tablet    ULTRAM    120 tablet    Take 1-2 tablets ( mg) by mouth every 6 hours as needed for moderate pain (max of 4 per day.) 30 day supply    SI (sacroiliac) joint dysfunction       zolpidem 10 MG tablet    AMBIEN    30 tablet    Take 1 tablet (10 mg) by mouth nightly as needed Pharmacy ok to hold prescription until due    Other insomnia

## 2017-11-06 NOTE — PROGRESS NOTES
"SUBJECTIVE:  Doug Galicia, a 41 year old male scheduled an appointment to discuss the following issues:  Need for prophylactic vaccination and inoculation against influenza  Follow-up chronic low back pain, insomnia and depression.   Concerns about ADHD. Patient tried wive's adderall and was helpful with focus and irritability.   Likely ADHD as youth but diagnosis with Tourettes so ADHD not persuted diagnosis.   gerd over the counter prilosec prn helpful.   No robaxin needed. Pain stable. Exercise - lifting weights.   Caffeine - decreased amount recently. No ALCOHOL. No SUICIAL IDEATION OR HOMOCIDAL IDEATION OR THEE.  No therapist in past.   No side effects. Patient would like wean down cymbalta.   No chest pain or shortness of breath. No upset stomach. Good insurance for meds.    Past Medical History:   Diagnosis Date     NONSPECIFIC MEDICAL HISTORY     Toretts Syndrome       Past Surgical History:   Procedure Laterality Date     ARTHROSCOPY KNEE RT/LT      right     C NONSPECIFIC PROCEDURE      Testicular Surgery       Family History   Problem Relation Age of Onset     Neurologic Disorder Father      seizures     DIABETES No family hx of      Colon Cancer Mother       in , diagnosed early 50s     Coronary Artery Disease No family hx of        Social History   Substance Use Topics     Smoking status: Former Smoker     Smokeless tobacco: Current User     Types: Chew      Comment: \"years\" per pt. did not know date     Alcohol use No       ROS:  All other ROS negative  OBJECTIVE:  /86  Pulse 107  Temp 99  F (37.2  C) (Oral)  Wt 230 lb (104.3 kg)  SpO2 100%  BMI 30.34 kg/m2  EXAM:  GENERAL APPEARANCE: healthy, alert and no distress  NECK: no adenopathy, no asymmetry, masses, or scars and thyroid normal to palpation  RESP: lungs clear to auscultation - no rales, rhonchi or wheezes  CV: regular rates and rhythm, normal S1 S2, no S3 or S4 and no murmur, click or rub -  ABDOMEN:  soft, nontender, no " HSM or masses and bowel sounds normal  MS: extremities normal- no gross deformities noted, no evidence of inflammation in joints, FROM in all extremities.  NEURO: Normal strength and tone, sensory exam grossly normal, mentation intact and speech normal  PSYCH: mentation appears normal and affect normal/bright. cheerful  PSYCH: mild anxious and inattentive    ASSESSMENT / PLAN:  (Z23) Need for prophylactic vaccination and inoculation against influenza  (primary encounter diagnosis)  Plan: FLU VAC, SPLIT VIRUS IM > 3 YO (QUADRIVALENT)         [30750], Vaccine Administration, Initial         [83028]            (F33.0) Major depressive disorder, recurrent episode, mild (H)  Comment: stable/improving  Plan: patient would like to wean down cymbalta. Will wean by 20mg qweek until 20 mg daily. If SUICIAL IDEATION OR HOMOCIDAL IDEATION OR THEE TO ER. Suggested seeing psych/therapist. Recheck in 3 months      (F51.04) Insomnia, psychophysiological  Comment: stable  Plan: continue ambien. Limit caffeine    (F90.9) Attention deficit hyperactivity disorder (ADHD), unspecified ADHD type  Comment: history tourettes too. Wive's adderal VERY helpful with focus/anxiety and irritablity without appearant side effects.   Plan: amphetamine-dextroamphetamine (ADDERALL XR) 20         MG per 24 hr capsule        Reveiwed risks and side effects of medication  Will do short term but I need ok per psych to prescription long term. Expected course and warning signs reviewed. Continue exercise. Limit caffeine. Monitor blood pressure. Consider clonodine or atypical add meds but will leave up to psych. Call/email with questions/concerns. Needs psych provider appointment set-up for more.     Ryan Johnston

## 2017-11-06 NOTE — NURSING NOTE
"Chief Complaint   Patient presents with     Medication Therapy Management       Initial /86  Pulse 107  Temp 99  F (37.2  C) (Oral)  Wt 230 lb (104.3 kg)  SpO2 100%  BMI 30.34 kg/m2 Estimated body mass index is 30.34 kg/(m^2) as calculated from the following:    Height as of 2/23/17: 6' 1\" (1.854 m).    Weight as of this encounter: 230 lb (104.3 kg).  Medication Reconciliation: complete   Jeimy Meade CMA    "

## 2017-11-06 NOTE — PROGRESS NOTES
medication management   Injectable Influenza Immunization Documentation    1.  Is the person to be vaccinated sick today?   No    2. Does the person to be vaccinated have an allergy to a component   of the vaccine?   No  Egg Allergy Algorithm Link    3. Has the person to be vaccinated ever had a serious reaction   to influenza vaccine in the past?   No    4. Has the person to be vaccinated ever had Guillain-Barré syndrome?   No    Form completed by Jeimy Meade CMA

## 2017-11-07 ASSESSMENT — ANXIETY QUESTIONNAIRES: GAD7 TOTAL SCORE: 5

## 2017-11-08 ENCOUNTER — TELEPHONE (OUTPATIENT)
Dept: FAMILY MEDICINE | Facility: CLINIC | Age: 42
End: 2017-11-08

## 2017-11-08 NOTE — TELEPHONE ENCOUNTER
Patient is calling stating has made a few attempts to schedule consult and has had problems and would like to discuss. Please call to discuss. Thank you.

## 2017-11-24 ENCOUNTER — MYC MEDICAL ADVICE (OUTPATIENT)
Dept: FAMILY MEDICINE | Facility: CLINIC | Age: 42
End: 2017-11-24

## 2018-02-01 ENCOUNTER — MYC REFILL (OUTPATIENT)
Dept: FAMILY MEDICINE | Facility: CLINIC | Age: 43
End: 2018-02-01

## 2018-02-01 ENCOUNTER — MYC MEDICAL ADVICE (OUTPATIENT)
Dept: FAMILY MEDICINE | Facility: CLINIC | Age: 43
End: 2018-02-01

## 2018-02-01 DIAGNOSIS — G47.09 OTHER INSOMNIA: ICD-10-CM

## 2018-02-01 DIAGNOSIS — F90.9 ATTENTION DEFICIT HYPERACTIVITY DISORDER (ADHD), UNSPECIFIED ADHD TYPE: ICD-10-CM

## 2018-02-01 RX ORDER — DEXTROAMPHETAMINE SACCHARATE, AMPHETAMINE ASPARTATE MONOHYDRATE, DEXTROAMPHETAMINE SULFATE AND AMPHETAMINE SULFATE 5; 5; 5; 5 MG/1; MG/1; MG/1; MG/1
20 CAPSULE, EXTENDED RELEASE ORAL DAILY
Qty: 30 CAPSULE | Refills: 0 | Status: CANCELLED | OUTPATIENT
Start: 2018-02-01

## 2018-02-01 NOTE — TELEPHONE ENCOUNTER
Message from MyChart:  Original authorizing provider: MD Abdias Martin would like a refill of the following medications:  zolpidem (AMBIEN) 10 MG tablet [Ryan Johnston MD]    Preferred pharmacy: [id number] - [place], [place] - [address] [address]    Comment:  Same as normal

## 2018-02-01 NOTE — TELEPHONE ENCOUNTER
Message from [employer]:  Original authorizing provider: MD Abdias Martin AILEENCarlo Galicia would like a refill of the following medications:  amphetamine-dextroamphetamine (ADDERALL XR) 20 MG per 24 hr capsule [Ryan Johnston MD]    Preferred pharmacy: [id number] - [place] [facility] [place] - [address] [address]    Comment:  Same as normal

## 2018-02-01 NOTE — TELEPHONE ENCOUNTER
Controlled Substance Refill Request for ambien  Problem List Complete:  No      PROVIDER TO CONSIDER COMPLETION OF PROBLEM LIST AND OVERVIEW/CONTROLLED SUBSTANCE AGREEMENT     Last Written Prescription Date:  Ambien 8/23/17   Last Fill Quantity: 30,   # refills: 5     Last Office Visit with [facility] primary care provider: 11-6-17     Future Office visit:      Controlled substance agreement on file: No.      Processing:  Fax Rx to [facility]      checked in past 6 months?  Yes 8/14/17    Jeimy Kaiser RN

## 2018-02-02 ENCOUNTER — TELEPHONE (OUTPATIENT)
Dept: FAMILY MEDICINE | Facility: CLINIC | Age: 43
End: 2018-02-02

## 2018-02-02 RX ORDER — ZOLPIDEM TARTRATE 10 MG/1
10 TABLET ORAL
Qty: 30 TABLET | Refills: 2 | Status: SHIPPED | OUTPATIENT
Start: 2018-02-02 | End: 2018-04-24

## 2018-02-07 ENCOUNTER — DOCUMENTATION ONLY (OUTPATIENT)
Dept: LAB | Facility: CLINIC | Age: 43
End: 2018-02-07

## 2018-02-07 ENCOUNTER — TRANSFERRED RECORDS (OUTPATIENT)
Dept: HEALTH INFORMATION MANAGEMENT | Facility: CLINIC | Age: 43
End: 2018-02-07

## 2018-02-07 NOTE — PROGRESS NOTES
..Please place or confirm orders for upcoming lab appointment on 02.09.18      Thanks  Irene Castillo

## 2018-02-08 NOTE — PROGRESS NOTES
I don't know want labs patient  Wants? Outside md ordered? Can cancel lab appointment if desired. Any labs I order require a follow-up md appointment. Ryan Johnston MD

## 2018-02-10 ENCOUNTER — APPOINTMENT (OUTPATIENT)
Dept: LAB | Facility: CLINIC | Age: 43
End: 2018-02-10
Payer: COMMERCIAL

## 2018-02-13 ENCOUNTER — TELEPHONE (OUTPATIENT)
Dept: FAMILY MEDICINE | Facility: CLINIC | Age: 43
End: 2018-02-13

## 2018-02-13 ENCOUNTER — OFFICE VISIT (OUTPATIENT)
Dept: FAMILY MEDICINE | Facility: CLINIC | Age: 43
End: 2018-02-13
Payer: COMMERCIAL

## 2018-02-13 VITALS
SYSTOLIC BLOOD PRESSURE: 127 MMHG | BODY MASS INDEX: 27.59 KG/M2 | TEMPERATURE: 99.6 F | WEIGHT: 215 LBS | HEIGHT: 74 IN | HEART RATE: 94 BPM | DIASTOLIC BLOOD PRESSURE: 81 MMHG

## 2018-02-13 DIAGNOSIS — R21 RASH: Primary | ICD-10-CM

## 2018-02-13 PROCEDURE — 99213 OFFICE O/P EST LOW 20 MIN: CPT | Performed by: FAMILY MEDICINE

## 2018-02-13 RX ORDER — SULFAMETHOXAZOLE/TRIMETHOPRIM 800-160 MG
1 TABLET ORAL 2 TIMES DAILY
Qty: 20 TABLET | Refills: 0 | Status: SHIPPED | OUTPATIENT
Start: 2018-02-13 | End: 2018-05-17

## 2018-02-13 RX ORDER — PREDNISONE 20 MG/1
40 TABLET ORAL DAILY
Qty: 10 TABLET | Refills: 0 | Status: SHIPPED | OUTPATIENT
Start: 2018-02-13 | End: 2018-02-18

## 2018-02-13 NOTE — PROGRESS NOTES
"  SUBJECTIVE:   Doug Galicia is a 42 year old male who presents to clinic today for the following health issues:      Rash  Onset: 2+ weeks     Description:   Location: leg, low back and arms and other random areas  Character: red  Itching (Pruritis): yes    Progression of Symptoms:  worsening    Accompanying Signs & Symptoms:  Fever: no   Body aches or joint pain: no   Sore throat symptoms: no   Recent cold symptoms: no     History:   Previous similar rash: no     Precipitating factors:   Exposure to similar rash: no   New exposures: None   Recent travel: no     Alleviating factors:      Therapies Tried and outcome: lotions    Pt with rash on lower legs, forearms, few spots on trunk  They start off with no rash but rather an itchy spot than then when scratched ends up being red bumps with scabs on top  It started on lower legs and has now progressed to other areas  He otherwise feels well.   Wife and 2 kids at home do not have a rash  No new travel or other exposures     Problem list and histories reviewed & adjusted, as indicated.  Additional history: as documented    Labs reviewed in EPIC    Reviewed and updated as needed this visit by clinical staff  Tobacco  Allergies  Meds  Med Hx  Surg Hx  Fam Hx  Soc Hx      Reviewed and updated as needed this visit by Provider         ROS:  Constitutional, HEENT, cardiovascular, pulmonary, gi and gu systems are negative, except as otherwise noted.    OBJECTIVE:     /81  Pulse 94  Temp 99.6  F (37.6  C) (Oral)  Ht 6' 2\" (1.88 m)  Wt 215 lb (97.5 kg)  BMI 27.6 kg/m2  Body mass index is 27.6 kg/(m^2).  GENERAL: healthy, alert and no distress  SKIN: d erythematous papular lesions  - arms, back and lower legs    Diagnostic Test Results:  none     ASSESSMENT/PLAN:     1. Rash  Not clear of cause. But definitely itchy and progressing  Will treat with abx and prednisone. Make appt with derm if not improving.   - sulfamethoxazole-trimethoprim (BACTRIM DS/SEPTRA " DS) 800-160 MG per tablet; Take 1 tablet by mouth 2 times daily  Dispense: 20 tablet; Refill: 0  - predniSONE (DELTASONE) 20 MG tablet; Take 2 tablets (40 mg) by mouth daily for 5 days  Dispense: 10 tablet; Refill: 0  - DERMATOLOGY REFERRAL        Marisol Santiago MD  [facility]

## 2018-02-13 NOTE — MR AVS SNAPSHOT
After Visit Summary   2/13/2018    Doug Galicia    MRN: [id number]           Patient Information     Date Of Birth          1975        Visit Information        Provider Department      2/13/2018 10:00 AM Marisol Sanders MD [facility]        Today's Diagnoses     Rash    -  1       Follow-ups after your visit        Additional Services     DERMATOLOGY REFERRAL       Your provider has referred you to: FMG: [facility] [place] [phone]   [email or web address]  UMP: [facility] [place] [phone]   [email or web address]  [facility] [place] [phone]   [email or web address]    Please be aware that coverage of these services is subject to the terms and limitations of your health insurance plan.  Call member services at your health plan with any benefit or coverage questions.      Please bring the following with you to your appointment:    (1) Any X-Rays, CTs or MRIs which have been performed.  Contact the facility where they were done to arrange for  prior to your scheduled appointment.    (2) List of current medications  (3) This referral request   (4) Any documents/labs given to you for this referral                  Your next 10 appointments already scheduled     Feb 15, 2018  1:40 PM CST   SHORT with Ryan Johnston MD   [facility] ([facility])    [address] [address] [phone]              Who to contact     If you have questions or need follow up information about today's clinic visit or your schedule please contact [facility] directly at [phone].  Normal or non-critical lab and imaging results will be communicated to you by MyChart, letter or phone within 4 business days after the clinic has received the results. If you do not hear  "from us within 7 days, please contact the clinic through Naytev or phone. If you have a critical or abnormal lab result, we will notify you by phone as soon as possible.  Submit refill requests through Naytev or call your pharmacy and they will forward the refill request to us. Please allow 3 business days for your refill to be completed.          Additional Information About Your Visit        [employer] Information     Naytev gives you secure access to your electronic health record. If you see a primary care provider, you can also send messages to your care team and make appointments. If you have questions, please call your primary care clinic.  If you do not have a primary care provider, please call [phone] and they will assist you.        Care EveryWhere ID     This is your Care EveryWhere ID. This could be used by other organizations to access your [facility] medical records  [id number]        Your Vitals Were     Pulse Temperature Height BMI (Body Mass Index)          94 99.6  F (37.6  C) (Oral) 6' 2\" (1.88 m) 27.6 kg/m2         Blood Pressure from Last 3 Encounters:   02/13/18 127/81   11/06/17 110/86   09/20/17 120/59    Weight from Last 3 Encounters:   02/13/18 215 lb (97.5 kg)   11/06/17 230 lb (104.3 kg)   06/22/17 230 lb (104.3 kg)              We Performed the Following     DERMATOLOGY REFERRAL          Today's Medication Changes          These changes are accurate as of 2/13/18 10:14 AM.  If you have any questions, ask your nurse or doctor.               Start taking these medicines.        Dose/Directions    predniSONE 20 MG tablet   Commonly known as:  DELTASONE   Used for:  Rash   Started by:  Marisol Sanders MD        Dose:  40 mg   Take 2 tablets (40 mg) by mouth daily for 5 days   Quantity:  10 tablet   Refills:  0       sulfamethoxazole-trimethoprim 800-160 MG per tablet   Commonly known as:  BACTRIM DS/SEPTRA DS   Used for:  Rash   Started by:  Marisol Sanders MD        Dose:  1 tablet "   Take 1 tablet by mouth 2 times daily   Quantity:  20 tablet   Refills:  0            Where to get your medicines      These medications were sent to [id number] - [place], [place] - [address] [address]  [address] [address] [address]    Hours:  test fax successful 4/5/04 kr Phone:  [phone]     predniSONE 20 MG tablet    sulfamethoxazole-trimethoprim 800-160 MG per tablet                Primary Care Provider Office Phone # Fax #    Ryan Johnston [phone] [address] [address] [address]        Equal Access to Services     [facility]: Hadii aad ku hadasho Soomaali, waaxda luqadaha, qaybta kaalmada adeegyada, kaya mcnamara . So [facility] [phone].    ATENCIÓN: Si habla español, tiene a forrester disposición servicios gratuitos de asistencia lingüística. [facility] [phone].    We comply with applicable federal civil rights laws and [place] laws. We do not discriminate on the basis of race, color, national origin, age, disability, sex, sexual orientation, or gender identity.            Thank you!     Thank you for choosing [facility]  for your care. Our goal is always to provide you with excellent care. Hearing back from our patients is one way we can continue to improve our services. Please take a few minutes to complete the written survey that you may receive in the mail after your visit with us. Thank you!             Your Updated Medication List - Protect others around you: Learn how to safely use, store and throw away your medicines at www.disposemymeds.org.          This list is accurate as of 2/13/18 10:14 AM.  Always use your most recent med list.                   Brand Name Dispense Instructions for use Diagnosis    amphetamine-dextroamphetamine 20 MG per 24 hr capsule    ADDERALL XR    30 capsule    Take 1 capsule (20 mg) by mouth daily More needed through psychiatry    Attention deficit hyperactivity  disorder (ADHD), unspecified ADHD type       MELATONIN PO      Take by mouth At Bedtime 6 mg        methocarbamol 500 MG tablet    ROBAXIN    60 tablet    Take 1-2 tablets (500-1,000 mg) by mouth 3 times daily as needed for muscle spasms    Back muscle spasm       Multi-vitamin Tabs tablet      Take 1 tablet by mouth daily        OMEPRAZOLE PO      Take 20 mg by mouth every morning        predniSONE 20 MG tablet    DELTASONE    10 tablet    Take 2 tablets (40 mg) by mouth daily for 5 days    Rash       sulfamethoxazole-trimethoprim 800-160 MG per tablet    BACTRIM DS/SEPTRA DS    20 tablet    Take 1 tablet by mouth 2 times daily    Rash       traMADol 50 MG tablet    ULTRAM    120 tablet    Take 1-2 tablets ( mg) by mouth every 6 hours as needed for moderate pain (max of 4 per day.) 30 day supply    SI (sacroiliac) joint dysfunction       zolpidem 10 MG tablet    AMBIEN    30 tablet    Take 1 tablet (10 mg) by mouth nightly as needed Pharmacy ok to hold prescription until due    Other insomnia

## 2018-02-13 NOTE — TELEPHONE ENCOUNTER
Patient calling. He has an order from an outside provider for an EKG related to his ADHD medication. He is wondering if this is something that Dr. Johnston can order to be done here at our clinic. If so, he states that he is available most afternoons. Okay to schedule and send him a message via [employer] confirming appointment.

## 2018-02-15 ENCOUNTER — OFFICE VISIT (OUTPATIENT)
Dept: FAMILY MEDICINE | Facility: CLINIC | Age: 43
End: 2018-02-15
Payer: COMMERCIAL

## 2018-02-15 VITALS
TEMPERATURE: 97.8 F | BODY MASS INDEX: 27.59 KG/M2 | WEIGHT: 215 LBS | HEART RATE: 104 BPM | HEIGHT: 74 IN | SYSTOLIC BLOOD PRESSURE: 135 MMHG | OXYGEN SATURATION: 100 % | DIASTOLIC BLOOD PRESSURE: 81 MMHG

## 2018-02-15 DIAGNOSIS — Z79.899 HIGH RISK MEDICATIONS (NOT ANTICOAGULANTS) LONG-TERM USE: Primary | ICD-10-CM

## 2018-02-15 DIAGNOSIS — R41.840 INATTENTION: ICD-10-CM

## 2018-02-15 DIAGNOSIS — F33.0 MAJOR DEPRESSIVE DISORDER, RECURRENT EPISODE, MILD (H): ICD-10-CM

## 2018-02-15 DIAGNOSIS — F95.2 TOURETTE SYNDROME: ICD-10-CM

## 2018-02-15 PROCEDURE — 93000 ELECTROCARDIOGRAM COMPLETE: CPT | Performed by: FAMILY MEDICINE

## 2018-02-15 PROCEDURE — 99214 OFFICE O/P EST MOD 30 MIN: CPT | Performed by: FAMILY MEDICINE

## 2018-02-15 NOTE — MR AVS SNAPSHOT
"              After Visit Summary   2/15/2018    Doug Galicia    MRN: [id number]           Patient Information     Date Of Birth          1975        Visit Information        Provider Department      2/15/2018 1:40 PM Ryan Johnston MD [facility]        Today's Diagnoses     High risk medications (not anticoagulants) long-term use    -  1    Major depressive disorder, recurrent episode, mild (H)        Inattention        Tourette syndrome           Follow-ups after your visit        Who to contact     If you have questions or need follow up information about today's clinic visit or your schedule please contact [facility] directly at [phone].  Normal or non-critical lab and imaging results will be communicated to you by [employer], letter or phone within 4 business days after the clinic has received the results. If you do not hear from us within 7 days, please contact the clinic through [employer] or phone. If you have a critical or abnormal lab result, we will notify you by phone as soon as possible.  Submit refill requests through [employer] or call your pharmacy and they will forward the refill request to us. Please allow 3 business days for your refill to be completed.          Additional Information About Your Visit        MyChart Information     [employer] gives you secure access to your electronic health record. If you see a primary care provider, you can also send messages to your care team and make appointments. If you have questions, please call your primary care clinic.  If you do not have a primary care provider, please call [phone] and they will assist you.        Care EveryWhere ID     This is your Care EveryWhere ID. This could be used by other organizations to access your [place] medical records  [id number]        Your Vitals Were     Pulse Temperature Height Pulse Oximetry BMI (Body Mass Index)       104 97.8  F (36.6  C) (Oral) 6' 2\" (1.88 m) 100% 27.6 kg/m2  "       Blood Pressure from Last 3 Encounters:   02/15/18 135/81   02/13/18 127/81   11/06/17 110/86    Weight from Last 3 Encounters:   02/15/18 215 lb (97.5 kg)   02/13/18 215 lb (97.5 kg)   11/06/17 230 lb (104.3 kg)              We Performed the Following     EKG 12-lead complete w/read - Clinics        Primary Care Provider Office Phone # Fax #    Ryan Johnston [phone] [address] [facility] [address]        Equal Access to Services     [facility]: Hadii aad ku hadasho Soomaali, waaxda luqadaha, qaybta kaalmada adeegyada, kaya mcnamara . So [facility] [phone].    ATENCIÓN: Si habla español, tiene a forrester disposición servicios gratuitos de asistencia lingüística. [facility] [phone].    We comply with applicable federal civil rights laws and [place] laws. We do not discriminate on the basis of race, color, national origin, age, disability, sex, sexual orientation, or gender identity.            Thank you!     Thank you for choosing [facility] [place] [facility]  for your care. Our goal is always to provide you with excellent care. Hearing back from our patients is one way we can continue to improve our services. Please take a few minutes to complete the written survey that you may receive in the mail after your visit with us. Thank you!             Your Updated Medication List - Protect others around you: Learn how to safely use, store and throw away your medicines at www.disposemymeds.org.          This list is accurate as of 2/15/18  2:29 PM.  Always use your most recent med list.                   Brand Name Dispense Instructions for use Diagnosis    amphetamine-dextroamphetamine 20 MG per 24 hr capsule    ADDERALL XR    30 capsule    Take 1 capsule (20 mg) by mouth daily More needed through psychiatry    Attention deficit hyperactivity disorder (ADHD), unspecified ADHD type       MELATONIN PO      Take by mouth At Bedtime 6 mg        methocarbamol  500 MG tablet    ROBAXIN    60 tablet    Take 1-2 tablets (500-1,000 mg) by mouth 3 times daily as needed for muscle spasms    Back muscle spasm       Multi-vitamin Tabs tablet      Take 1 tablet by mouth daily        OMEPRAZOLE PO      Take 20 mg by mouth every morning        predniSONE 20 MG tablet    DELTASONE    10 tablet    Take 2 tablets (40 mg) by mouth daily for 5 days    Rash       sulfamethoxazole-trimethoprim 800-160 MG per tablet    BACTRIM DS/SEPTRA DS    20 tablet    Take 1 tablet by mouth 2 times daily    Rash       traMADol 50 MG tablet    ULTRAM    120 tablet    Take 1-2 tablets ( mg) by mouth every 6 hours as needed for moderate pain (max of 4 per day.) 30 day supply    SI (sacroiliac) joint dysfunction       zolpidem 10 MG tablet    AMBIEN    30 tablet    Take 1 tablet (10 mg) by mouth nightly as needed Pharmacy ok to hold prescription until due    Other insomnia

## 2018-02-15 NOTE — NURSING NOTE
"Chief Complaint   Patient presents with     A.D.H.D     needs EKG        Initial /81  Pulse 104  Temp 97.8  F (36.6  C) (Oral)  Ht 6' 2\" (1.88 m)  Wt 215 lb (97.5 kg)  SpO2 100%  BMI 27.6 kg/m2 Estimated body mass index is 27.6 kg/(m^2) as calculated from the following:    Height as of this encounter: 6' 2\" (1.88 m).    Weight as of this encounter: 215 lb (97.5 kg).  Medication Reconciliation: complete    Clari Richards MA    "

## 2018-02-15 NOTE — PROGRESS NOTES
"SUBJECTIVE:  Doug Galicia, a 42 year old male scheduled an appointment to discuss the following issues:  High risk medications (not anticoagulants) long-term use  Seeing [facility] for ADD. Needs EKG.   History depression, insomnia and chronic back pain. Seen by pain specialist in past. Concerns about possible tourettes too.   Non-smoker. Normal blood pressure reading. Normal cholesterol. No cardiac issues in past or family history heart problems.   No chest pain or shortness of breath. Good exercise tolerance.   adderall very helpful. Follow-up appointment with psych and going to neurology.   Past Medical History:   Diagnosis Date     NONSPECIFIC MEDICAL HISTORY     Toretts Syndrome       Past Surgical History:   Procedure Laterality Date     ARTHROSCOPY KNEE RT/LT      right     C NONSPECIFIC PROCEDURE      Testicular Surgery       Family History   Problem Relation Age of Onset     Colon Cancer Mother       in , diagnosed early 50s     Neurologic Disorder Father      seizures     DIABETES No family hx of      Coronary Artery Disease No family hx of        Social History   Substance Use Topics     Smoking status: Former Smoker     Smokeless tobacco: Current User     Types: Chew      Comment: \"years\" per pt. did not know date     Alcohol use No       ROS:  All other ROS negative.  OBJECTIVE:  There were no vitals taken for this visit.  EXAM:  GENERAL APPEARANCE: healthy, alert and no distress  EYES: EOMI,  PERRL  HENT: ear canals and TM's normal and nose and mouth without ulcers or lesions  NECK: no adenopathy, no asymmetry, masses, or scars and thyroid normal to palpation  RESP: lungs clear to auscultation - no rales, rhonchi or wheezes  CV: regular rates and rhythm, normal S1 S2, no S3 or S4 and no murmur, click or rub -  ABDOMEN:  soft, nontender, no HSM or masses and bowel sounds normal  MS: extremities normal- no gross deformities noted, no evidence of inflammation in joints, FROM in all " extremities.  NEURO: Normal strength and tone, sensory exam grossly normal/   PSYCH: mentation appears normal and affect normal/bright  PSYCH:mildly anxious    ASSESSMENT / PLAN:  (Z79.509) High risk medications (not anticoagulants) long-term use  (primary encounter diagnosis)  Comment: ekg with possible LVH. Low risk. Denies chest pain or shortness of breath. Non-smoker and normal blood pressure/lipids in past  Plan: EKG 12-lead complete w/read - Clinics        Will get echo if patient or psych desires but patient NOT interested in echo. Will need to monitor blood pressure and pulse with add meds. Limit caffeine and increase exercise    (F33.0) Major depressive disorder, recurrent episode, mild (H)  Comment: stable  Plan: per psych. If SUICIAL IDEATION OR HOMOCIDAL IDEATION OR THEE TO ER. Exercise.    (R41.370) Inattention  Comment: adderall helpful but needs clearance per neurology/psych      (F95.2) Tourette syndrome  Plan: seeing neuro.    Ryan Johnston

## 2018-02-18 ENCOUNTER — MYC REFILL (OUTPATIENT)
Dept: FAMILY MEDICINE | Facility: CLINIC | Age: 43
End: 2018-02-18

## 2018-02-18 DIAGNOSIS — M53.3 SI (SACROILIAC) JOINT DYSFUNCTION: ICD-10-CM

## 2018-02-19 ENCOUNTER — TELEPHONE (OUTPATIENT)
Dept: FAMILY MEDICINE | Facility: CLINIC | Age: 43
End: 2018-02-19

## 2018-02-19 DIAGNOSIS — R94.31 ABNORMAL ELECTROCARDIOGRAM: Primary | ICD-10-CM

## 2018-02-19 RX ORDER — TRAMADOL HYDROCHLORIDE 50 MG/1
50-100 TABLET ORAL EVERY 6 HOURS PRN
Qty: 120 TABLET | Refills: 5 | Status: SHIPPED | OUTPATIENT
Start: 2018-02-19 | End: 2018-08-28

## 2018-02-19 NOTE — TELEPHONE ENCOUNTER
Controlled Substance Refill Request for tramadol  Problem List Complete:  Yes  Patient is followed by Ryan Johnston MD for ongoing prescription of pain medication.  All refills should only be approved by this provider, or covering partner.    Medication(s): tramadol.   Maximum quantity per month: 120  Clinic visit frequency required: Q 6  months     Controlled substance agreement:  Encounter-Level CSA - 02/16/2015:                 Controlled Substance Agreement - Scan on 3/7/2015  1:47 PM : Controlled Medication Agreement 02/16/15 (below)            Pain Clinic evaluation in the past: Yes       Date/Location:      DIRE Total Score(s):  No flowsheet data found.    Last [facility] website verification:  none   [email or web address]     checked in past 6 months?  Yes 2/19/18   Jeimy Kaiser RN

## 2018-02-19 NOTE — TELEPHONE ENCOUNTER
Message from MyChart:  Original authorizing provider: MD Abdias Martin would like a refill of the following medications:  traMADol (ULTRAM) 50 MG tablet [Ryna Johnston MD]    Preferred pharmacy: [id number] - [place], [place] - [address]    Comment:  Last refill at [facility] was 1/11/18....had one refill remaining but it dated out 2/11/18.

## 2018-02-19 NOTE — TELEPHONE ENCOUNTER
Spoke with Dina from [facility].  Advised her of information in plan from ov 2-15-18:      Z79.899) High risk medications (not anticoagulants) long-term use  (primary encounter diagnosis)  Comment: ekg with possible LVH. Low risk. Denies chest pain or shortness of breath. Non-smoker and normal blood pressure/lipids in past  Plan: EKG 12-lead complete w/read - Clinics        Will get echo if patient or psych desires but patient NOT interested in echo. Will need to monitor blood pressure and pulse with add meds. Limit caffeine and increase exercise    (R41.840) Inattention  Comment: adderall helpful but needs clearance per neurology/psych       She states her provider does not want to prescribe a stimulant without clearance from pmd.  Advised Dr. Johnston sent pt to them for their opinion. She suggests pt be sent to cardiologist for clearance or does Dr. Johnston want to do something else?  To provider on next step.  Jeimy Kaiser RN

## 2018-02-19 NOTE — TELEPHONE ENCOUNTER
Pt notified of provider message as written.  Pt verbalized good understanding.  Scheduling number sent through [employer] to pt, pt aware.   Jeimy Kaiser RN

## 2018-02-19 NOTE — TELEPHONE ENCOUNTER
Liliya from [facility] would like to speak with pcp about getting a letter to allow them to give patient a stimulant due to his last EKG they would like to discuss this with pcp  Thank you

## 2018-02-19 NOTE — TELEPHONE ENCOUNTER
Please call and help set-up cardiac echo and if normal I will clear him from a cardiology stand-point. If abnormal will have cardiology see him. Patient was not having symptoms or chest pain or shortness of breath but can see cardiology if having symptoms too. Ryan Johnston MD

## 2018-02-22 ENCOUNTER — MYC MEDICAL ADVICE (OUTPATIENT)
Dept: FAMILY MEDICINE | Facility: CLINIC | Age: 43
End: 2018-02-22

## 2018-02-22 ENCOUNTER — TELEPHONE (OUTPATIENT)
Dept: FAMILY MEDICINE | Facility: CLINIC | Age: 43
End: 2018-02-22

## 2018-02-22 NOTE — TELEPHONE ENCOUNTER
See previous message.  Nurse at [facility] notified that Dr. Johnston's response to last message was:    Please call and help set-up cardiac echo and if normal I will clear him from a cardiology stand-point. If abnormal will have cardiology see him. Patient was not having symptoms or chest pain or shortness of breath but can see cardiology if having symptoms too. Ryan Johnston MD    Pt has been advised of this information and given scheduling number.  Jeimy Kaiser RN

## 2018-02-22 NOTE — TELEPHONE ENCOUNTER
Liliya chapman from [facility], spoke to Dr Johnston nurse recently and is waiting on a call back re an EKG. Would like a call back as soon as possible to number listed.

## 2018-03-02 ENCOUNTER — RADIANT APPOINTMENT (OUTPATIENT)
Dept: CARDIOLOGY | Facility: CLINIC | Age: 43
End: 2018-03-02
Attending: FAMILY MEDICINE
Payer: COMMERCIAL

## 2018-03-02 DIAGNOSIS — R94.31 ABNORMAL ELECTROCARDIOGRAM: ICD-10-CM

## 2018-03-02 PROCEDURE — 93306 TTE W/DOPPLER COMPLETE: CPT | Mod: GC | Performed by: INTERNAL MEDICINE

## 2018-03-14 ENCOUNTER — MYC MEDICAL ADVICE (OUTPATIENT)
Dept: FAMILY MEDICINE | Facility: CLINIC | Age: 43
End: 2018-03-14

## 2018-03-21 ENCOUNTER — MYC MEDICAL ADVICE (OUTPATIENT)
Dept: FAMILY MEDICINE | Facility: CLINIC | Age: 43
End: 2018-03-21

## 2018-03-26 ENCOUNTER — MYC MEDICAL ADVICE (OUTPATIENT)
Dept: FAMILY MEDICINE | Facility: CLINIC | Age: 43
End: 2018-03-26

## 2018-04-06 ENCOUNTER — MYC MEDICAL ADVICE (OUTPATIENT)
Dept: PALLIATIVE MEDICINE | Facility: CLINIC | Age: 43
End: 2018-04-06

## 2018-04-06 DIAGNOSIS — M53.3 SI (SACROILIAC) JOINT DYSFUNCTION: Primary | ICD-10-CM

## 2018-04-09 ENCOUNTER — TELEPHONE (OUTPATIENT)
Dept: PALLIATIVE MEDICINE | Facility: CLINIC | Age: 43
End: 2018-04-09

## 2018-04-09 NOTE — TELEPHONE ENCOUNTER
I will sign  In the future, will ask that Dr. Johnston sign    Radha Marte MD  [facility]

## 2018-04-09 NOTE — TELEPHONE ENCOUNTER
Follow up plan from last office on 10/10/16  visit pasted below. Order for SI joint injections is pended. Routing to Dr. Marte to review.    1. Follow up: Patient has been very stable.  No procedures in > 1 year, and stable meds.  Plan to ask PCP to take over prescribing.  If procedures, they can be ordered prn.  If changes in pain, can come back. Follow up PRN in future.    9/20/17 was last procedure performed: bilateral SI joint injections      MATIAS Anderson, RN  Care Coordinator  [facility]

## 2018-04-18 NOTE — TELEPHONE ENCOUNTER
Pre-screening Questions for Radiology Injections:     Injection to be done at which interventional clinic site? [facility] [place]     Procedure ordered by Dr. Marte     Procedure ordered? Bilateral SI Joint Injection     What insurance would patient like us to bill for this procedure? [employer]       Worker's comp-Any injection DO NOT SCHEDULE and route to Lorena Ribera.       mohchi insurance - For SI joint injections, DO NOT SCHEDULE and route Tami Mccord.       [employer]- MBB's must be scheduled at LEAST two weeks apart       [employer] - Any injection besides hip/shoulder/knee joint DO NOT SCHEDULE and route to Tami Flex. She will obtain PA and call pt back to schedule procedure or notify pt of denial.     HP C[employer]-PA REQUIRED FOR NON-JAIME OR Joint injections     Any chance of pregnancy? Not Applicable   If YES, do NOT schedule and route to RN pool     Is an  needed? No     Patient has a drive home? (mandatory) YES: INFORMED     Is patient taking any blood thinners (plavix, coumadin, jantoven, warfarin, heparin, pradaxa or dabigatran )? No   If hold needed, do NOT schedule, route to RN pool     Is patient taking any aspirin products? No     If more than 325mg/day do NOT schedule; route to RN pool     For CERVICAL procedures, hold all aspirin products for 6 days.      Does the patient have a bleeding or clotting disorder? No     If yes, okay to schedule AND route to RN nurse pool    **For any patients with platelet count <100, must be forwarded to provider**     Is patient diabetic?  No  If YES, have them bring their glucometer.     Does patient have an active infection or treated for one within the past week? No     Is patient currently taking any antibiotics?  No     For patients on chronic, preventative, or prophylactic antibiotics, procedures may be scheduled.     For patients on antibiotics for active or recent infection:    Monica Holt, Lucius, Rosanna,  Mor-antibiotic course must have been completed for 4 days    Monica Gonzalez-antibiotic course must have been completed for 7 days     Is patient currently taking any steroid medications? (i.e. Prednisone, Medrol)  No     For patients on steroid medications:    Lucius Quach [place], Burton-steroid course must have been completed for 4 days    Monica Gonzalez-steroid course must have been completed for 7 days     Reviewed with patient:  If you are started on any steroids or antibiotics between now and your appointment, you must contact us because it may affect our ability to perform your procedure.  Yes     Is patient actively being treated for cancer or immunocompromised, including the spleen having been removed? No    If YES, do NOT schedule and route to RN pool     **For Dr. Gandara patients without spleens should have the chart sent to her**     Are you able to get on and off an exam table with minimal or no assistance? Yes  If NO, do NOT schedule and route to RN pool     Are you able to roll over and lay on your stomach with minimal or no assistance? Yes  If NO, do NOT schedule and route to RN pool     Any allergies to contrast dye, iodine, shellfish, or numbing and steroid medications? No  If YES, route to RN pool AND add allergy information to appointment notes     Allergies: Penicillins         Has the patient had a flu shot or any other vaccinations within 7 days before or after the procedure.  No        Does patient have an MRI/CT?  Not Applicable  (SI joint, hip injections, lumbar sympathetic blocks, and stellate ganglion blocks do not require an MRI)    Was the MRI done w/in the last 3 years?  NA    Was MRI done at [facility]? No      If not, where was it done? N/A        If MRI was not done at [facility], [facility] or [facility] do NOT schedule and route to nursing.  If pt has an imaging disc, the injection may be scheduled but pt has to bring disc to appt. If they show up w/out disc  the injection cannot be done     Reminders (please tell patient if applicable):       Instructed pt to arrive 30 minutes early for IV start if this is for a cervical procedure, ALL sympathetic (stellate ganglion, hypogastric, or lumbar sympathetic block) and all sedation procedures (RFA, spinal cord stimulation trials).  Not Applicable    -IVs are not routinely placed for [place] and [place] cervical case       If NPO for sedation, informed patient that it is okay to take medications with sips of water (except if they are to hold blood thinners).  Not Applicable                         *DO take blood pressure medication if it is prescribed*       If this is for a cervical JAIME, informed patient that aspirin needs to be held for 6 days.   Not Applicable    *pt will call back to schedule injection*       For all patients not having spinal cord stimulator (SCS) trials or radiofrequency ablations (RFAs), informed patient:  IV sedation is not provided for this procedure.  If you feel that an oral anti-anxiety medication is needed, you can discuss this further with your referring provider or primary care provider.  The Pain Clinic provider will discuss specifics of what the procedure includes at your appointment.  Most procedures last 10-20 minutes.  We use numbing medications to help with any discomfort during the procedure.  Not Applicable       Do not schedule procedures requiring IV placement in the first appointment of the day or first appointment after lunch. N/A       For patients 85 or older we recommend having an adult stay w/ them for the remainder of the day.   N/A     Does the patient have any questions?  NO

## 2018-04-19 NOTE — TELEPHONE ENCOUNTER
Pre-screening Questions for Radiology Injections:    Injection to be done at which interventional clinic site? [facility] [place]   If [facility], instruct patient to arrive 30 minutes before the scheduled appointment time.     Procedure ordered by RAYSA    Procedure ordered?  SI Joint Injection    What insurance would patient like us to bill for this procedure? AETNA      Worker's comp or MVA (motor vehicle accident) -Any injection DO NOT SCHEDULE and route to Lorena Ribera.      [place] - For SI joint injections, DO NOT SCHEDULE and route Tami Mccord. [place] [employer] NO PA REQUIRED EFFECTIVE 11/1/2017      [employer]- MBB's must be scheduled at LEAST two weeks apart      [employer] - Any injection besides hip/shoulder/knee joint DO NOT SCHEDULE and route to Tami Mccord. She will obtain PA and call pt back to schedule procedure or notify pt of denial.       [employer]-Route to Tami for review    Any chance of pregnancy? Not Applicable   If YES, do NOT schedule and route to RN pool    Is an  needed? No     Patient has a drive home? (mandatory) YES:     Is patient taking any blood thinners (plavix, coumadin, jantoven, warfarin, heparin, pradaxa or dabigatran )? No   If hold needed, do NOT schedule, route to RN pool     Is patient taking any aspirin products? No     If more than 325mg/day do NOT schedule; route to RN pool     For CERVICAL procedures, hold all aspirin products for 6 days.      Does the patient have a bleeding or clotting disorder? No     If YES, okay to schedule AND route to RN nurse pool    **For any patients with platelet count <100, must be forwarded to provider**    Is patient diabetic?  No  If YES, have them bring their glucometer.    Does patient have an active infection or treated for one within the past week? No     Is patient currently taking any antibiotics?  No     For patients on chronic, preventative, or prophylactic antibiotics,  procedures may be scheduled.     For patients on antibiotics for active or recent infection:    Rosanna Quach Burton, Snitzer-antibiotic course must have been completed for 4 days    Dr. Christianson-antibiotic course must have been completed for 7 days    Is patient currently taking any steroid medications? (i.e. Prednisone, Medrol)  No     For patients on steroid medications:    Rosanna Quach Burton, Snitzer-steroid course must have been completed for 4 days    -steroid course must have been completed for 7 days    Reviewed with patient:  If you are started on any steroids or antibiotics between now and your appointment, you must contact us because it may affect our ability to perform your procedure.  Yes    Is patient actively being treated for cancer or immunocompromised? No  If YES, do NOT schedule and route to RN pool     Are you able to get on and off an exam table with minimal or no assistance? Yes  If NO, do NOT schedule and route to RN pool    Are you able to roll over and lay on your stomach with minimal or no assistance? Yes  If NO, do NOT schedule and route to RN pool     Any allergies to contrast dye, iodine, shellfish, or numbing and steroid medications? No  If YES, route to RN pool AND add allergy information to appointment notes    Allergies: Penicillins      Has the patient had a flu shot or any other vaccinations within 7 days before or after the procedure.  No     Does patient have an MRI/CT?  Not Applicable  (SI joint, hip injections, lumbar sympathetic blocks, and stellate ganglion blocks do not require an MRI)    Was the MRI done w/in the last 3 years?  NA    Was MRI done at [place]? No      If not, where was it done? N/A       If MRI was not done at [place], [facility] or [facility] do NOT schedule and route to nursing.  If pt has an imaging disc, the injection may be scheduled but pt has to bring disc to appt. If they show up w/out disc the injection cannot be  done    Reminders (please tell patient if applicable):       Instructed pt to arrive 30 minutes early for IV start if this is for a cervical procedure, ALL sympathetic (stellate ganglion, hypogastric, or lumbar sympathetic block) and all sedation procedures (RFA, spinal cord stimulation trials).  Not Applicable   -IVs are not routinely placed for Dr. Juares cervical cases   -Dr. Liao: IVs for cervical ESIs and cervical TBDs (not CMBBs/facet inj)      If NPO for sedation, informed patient that it is okay to take medications with sips of water (except if they are to hold blood thinners).  Not Applicable   *DO take blood pressure medication if it is prescribed*      If this is for a cervical JAIME, informed patient that aspirin needs to be held for 6 days.   Not Applicable      For all patients not having spinal cord stimulator (SCS) trials or radiofrequency ablations (RFAs), informed patient:    IV sedation is not provided for this procedure.  If you feel that an oral anti-anxiety medication is needed, you can discuss this further with your referring provider or primary care provider.  The Pain Clinic provider will discuss specifics of what the procedure includes at your appointment.  Most procedures last 10-20 minutes.  We use numbing medications to help with any discomfort during the procedure.  Not Applicable      Do not schedule procedures requiring IV placement in the first appointment of the day or first appointment after lunch. Do NOT schedule at 0745, 0815 or 1245.       For patients 85 or older we recommend having an adult stay w/ them for the remainder of the day.       Does the patient have any questions?    Lorena Ribera  [facility]

## 2018-04-24 DIAGNOSIS — G47.09 OTHER INSOMNIA: ICD-10-CM

## 2018-04-24 RX ORDER — ZOLPIDEM TARTRATE 10 MG/1
10 TABLET ORAL
Qty: 30 TABLET | Refills: 0 | Status: SHIPPED | OUTPATIENT
Start: 2018-04-24 | End: 2018-05-17

## 2018-05-02 ENCOUNTER — MYC MEDICAL ADVICE (OUTPATIENT)
Dept: PALLIATIVE MEDICINE | Facility: CLINIC | Age: 43
End: 2018-05-02

## 2018-05-10 ENCOUNTER — RADIANT APPOINTMENT (OUTPATIENT)
Dept: RADIOLOGY | Facility: CLINIC | Age: 43
End: 2018-05-10
Attending: PSYCHIATRY & NEUROLOGY
Payer: COMMERCIAL

## 2018-05-10 ENCOUNTER — RADIOLOGY INJECTION OFFICE VISIT (OUTPATIENT)
Dept: PALLIATIVE MEDICINE | Facility: CLINIC | Age: 43
End: 2018-05-10
Payer: COMMERCIAL

## 2018-05-10 VITALS — DIASTOLIC BLOOD PRESSURE: 84 MMHG | OXYGEN SATURATION: 99 % | SYSTOLIC BLOOD PRESSURE: 120 MMHG | HEART RATE: 83 BPM

## 2018-05-10 DIAGNOSIS — M53.3 SI (SACROILIAC) JOINT DYSFUNCTION: Primary | ICD-10-CM

## 2018-05-10 DIAGNOSIS — M53.3 SACROILIAC JOINT DYSFUNCTION: ICD-10-CM

## 2018-05-10 PROCEDURE — 27096 INJECT SACROILIAC JOINT: CPT | Mod: 50 | Performed by: PSYCHIATRY & NEUROLOGY

## 2018-05-10 ASSESSMENT — PAIN SCALES - GENERAL: PAINLEVEL: MODERATE PAIN (5)

## 2018-05-10 NOTE — PATIENT INSTRUCTIONS
[facility]   Procedure Discharge Instructions    Today you saw:     Dr. Love Marte      You had an:  sacro-iliac joint injection       Medications used:  Lidocaine  Ropivicaine   Kenalog  Omnipaque              Be cautious when walking. Numbness and/or weakness in the lower extremities may occur for up to 6-8 hours after the procedure due to effect of the local anesthetic    Do not drive for 6 hours. The effect of the local anesthetic could slow your reflexes.     You may resume your regular activities after 24 hours    Avoid strenuous activity for the first 24 hours    You may shower, however avoid swimming, tub baths or hot tubs for 24 hours following your procedure    You may have a mild to moderate increase in pain for several days following the injection.    It may take up to 14 days for the steroid medication to start working although you may feel the effect as early as a few days after the procedure.       You may use ice packs for 10-15 minutes, 3 to 4 times a day at the injection site for comfort    Do not use heat to painful areas for 6 to 8 hours. This will give the local anesthetic time to wear off and prevent you from accidentally burning your skin.     You may use anti-inflammatory medications (such as Ibuprofen or Aleve or Advil) or Tylenol for pain control if necessary    If you experience any of the following, call the Pain Clinic during work hours at [phone] or the Provider Line after hours at [phone]:  -Fever over 100 degree F  -Swelling, bleeding, redness, drainage, warmth at the injection site  -Progressive weakness or numbness in your legs or arms  -Loss of bowel or bladder function  -Unusual headache that is not relieved by Tylenol or other pain reliever  -Unusual new onset of pain that is not improving

## 2018-05-10 NOTE — PROGRESS NOTES
Pre procedure Diagnosis: SI joint dysfunction    Post procedure Diagnosis: Same  Procedure performed: bilateral SI joint injections  Anesthesia: none  Complications: none  Operators: Radha Marte MD, Alicia Snyder MD and Niurka Marte DO     Indications:   Doug Galicia is a 42 year old male seen by me for injections, sent by Dr. Johnston. They have a history of low back and buttock pain.  Exam shows bilateral SI joint tenderness and they have tried conservative treatment including medications, physical therapy, home-exercise, and previous injections.    Options/alternatives, benefits and risks were discussed with the patient including bleeding, infection, no pain relief, tissue trauma, exposure to radiation, reaction to medications including seizure, spinal cord injury, dural puncture, weakness, numbness and headache.  Questions were answered to his satisfaction and he agrees to proceed. Voluntary informed consent was obtained and signed.     Vitals were reviewed: Yes  Allergies were reviewed:  Yes   Medications were reviewed:  Yes   Pre-procedure pain score: 4/10    Procedure:  After getting informed consent, patient was brought into the procedure suite and was placed in a prone position on the procedure table.   A Pause for the Cause was performed.  Patient was prepped and draped in sterile fashion.     After identifying the bilateral SI joint, the C-arm was rotated to a obliquely to obtain the best view of the inferior angle of the joint.  A total of 3 ml of Lidocaine 1%  was used to anesthetize the skin at a skin entry site coaxial with the fluoroscopy beam at this location.  A 22gauge 3.5 inch needle was advanced under intermittent fluoroscopy until it was felt to enter the SI joint    A total of 2ml of Omnipaque-300 was injected, confirming appropriate position, with spread into the intraarticular space, with no intravascular uptake noted.  8ml was wasted. Location was verified in lateral view.    3 ml of  0.2% ropivacaine with 80mg of kenalog was injected, divided between the two sides.  The needle was flushed with lidocaine and removed.    During the procedure, there was not paresthesia.     Hemostasis was achieved, the area was cleaned, and bandaids were placed when appropriate.  The patient tolerated the procedure well, and was taken to the recovery room.    Images were saved to PACS.    Post-procedure pain score: 0/10  Follow-up includes:   -f/u phone call in one week  -f/u with Dr. Rosanna Marte MD  [facility]

## 2018-05-10 NOTE — NURSING NOTE
Discharge Information    IV Discontiued Time:  NA    Amount of Fluid Infused:  NA    Discharge Criteria = When patient returns to baseline or as per MD order    Consciousness:  Pt is fully awake    Circulation:  BP +/- 20% of pre-procedure level    Respiration:  Patient is able to breathe deeply    O2 Sat:  Patient is able to maintain O2 Sat >92% on room air    Activity:  Moves 4 extremities on command    Ambulation:  Patient is able to stand and walk or stand and pivot into wheelchair    Dressing:  Clean/dry or No Dressing    Notes:   Discharge instructions and AVS given to patient    Patient meets criteria for discharge?  YES    Admitted to PCU?  No    Responsible adult present to accompany patient home?  Yes    Signature/Title:    Rafaela Marin RN Care Coordinator  [facility]

## 2018-05-10 NOTE — NURSING NOTE
Pre-procedure Intake    Have you been fasting? NA    If yes, for how long? NA    Are you taking a prescribed blood thinner such as coumadin, Plavix, Xarelto?    No    If yes, when did you take your last dose? NA    Do you take aspirin?  No    If cervical procedure, have you held aspirin for 6 days?   NA    Do you have any allergies to contrast dye, iodine, steroid and/or numbing medications?  NO    Are you currently taking antibiotics or have an active infection?  NO    Have you had a fever/elevated temperature within the past week? NO    Are you currently taking oral steroids? NO    Do you have a ? Yes       Are you pregnant or breastfeeding?  Not Applicable    Are the vital signs normal?  Yes    Ryann Garber CMA ([facility])

## 2018-05-17 ENCOUNTER — OFFICE VISIT (OUTPATIENT)
Dept: FAMILY MEDICINE | Facility: CLINIC | Age: 43
End: 2018-05-17
Payer: COMMERCIAL

## 2018-05-17 ENCOUNTER — MYC REFILL (OUTPATIENT)
Dept: FAMILY MEDICINE | Facility: CLINIC | Age: 43
End: 2018-05-17

## 2018-05-17 VITALS
DIASTOLIC BLOOD PRESSURE: 81 MMHG | HEART RATE: 48 BPM | BODY MASS INDEX: 27.73 KG/M2 | OXYGEN SATURATION: 100 % | SYSTOLIC BLOOD PRESSURE: 131 MMHG | TEMPERATURE: 98 F | RESPIRATION RATE: 18 BRPM | WEIGHT: 216 LBS

## 2018-05-17 DIAGNOSIS — G47.429 NARCOLEPSY DUE TO UNDERLYING CONDITION WITHOUT CATAPLEXY: Primary | ICD-10-CM

## 2018-05-17 DIAGNOSIS — G47.09 OTHER INSOMNIA: ICD-10-CM

## 2018-05-17 PROCEDURE — 99213 OFFICE O/P EST LOW 20 MIN: CPT | Performed by: FAMILY MEDICINE

## 2018-05-17 NOTE — PROGRESS NOTES
SUBJECTIVE:  42 year old.The patient has a history of feeling he is going to fall asleep at times that are random.  This started 6 months ago.  Associated symptoms are fatigue feel likes in his head is not working.  Brought on by boredomw without anything to do .  Better with nap or tries to force activities ROS does not fall asleep while driving.  Has been on ADderall for ADHD and did not have this problem at that time    Reviewed health maintenance  Patient Active Problem List   Diagnosis     CARDIOVASCULAR SCREENING; LDL GOAL LESS THAN 160     Sacro ilial pain     Insomnia     SI (sacroiliac) joint dysfunction     Chronic low back pain     Long term current use of opiate analgesic     Vitamin B12 deficiency without anemia     Chronic pain     Mild depression (H)     Insomnia, psychophysiological     Major depressive disorder, recurrent episode, mild (H)     Insomnia, unspecified type     Chronic pain syndrome     Tourette syndrome     Past Medical History:   Diagnosis Date     NONSPECIFIC MEDICAL HISTORY     Toretts Syndrome       OBJECTIVE:  no apparent distress  /81  Pulse (!) 48  Temp 98  F (36.7  C) (Oral)  Resp 18  Wt 216 lb (98 kg)  SpO2 100%  BMI 27.73 kg/m2    LUNGS:  CTA B/L, no wheezing or crackles.   Cardiovascular: negative, PMI normal. No lifts, heaves, or thrills. RRR. No murmurs, clicks gallops or rub   Gastrointestinal: Abdomen soft, non-tender. BS normal. No masses, organomegaly       ICD-10-CM    1. Narcolepsy due to underlying condition without cataplexy G47.429 SLEEP EVALUATION & MANAGEMENT REFERRAL - [facility]  [phone] (Age 15 and up)    PLAN: await sleep specialist

## 2018-05-17 NOTE — NURSING NOTE
"Chief Complaint   Patient presents with     Consult       Initial /81  Pulse (!) 48  Temp 98  F (36.7  C) (Oral)  Resp 18  Wt 216 lb (98 kg)  SpO2 100%  BMI 27.73 kg/m2 Estimated body mass index is 27.73 kg/(m^2) as calculated from the following:    Height as of 2/15/18: 6' 2\" (1.88 m).    Weight as of this encounter: 216 lb (98 kg).  Medication Reconciliation: complete  Geena Vogel M.A.    "

## 2018-05-17 NOTE — MR AVS SNAPSHOT
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  Call member services at your health plan with any benefit or coverage questions.      Please bring the following to your appointment:    >>   List of current medications   >>   This referral request   >>   Any documents/labs given to you for this referral                      Future tests that were ordered for you today     Open Future Orders        Priority Expected Expires Ordered    SLEEP EVALUATION & MANAGEMENT REFERRAL - [facility]  [phone] (Age 15 and up) Routine  5/17/2019 5/17/2018            Who to contact     If you have questions or need follow up information about today's clinic visit or your schedule please contact [facility] directly at [phone].  Normal or non-critical lab and imaging results will be communicated to you by MyChart, letter or phone within 4 business days after the clinic has received the results. If you do not hear from us within 7 days, please contact the clinic through MyChart or phone. If you have a critical or abnormal lab result, we will notify you by phone as soon as possible.  Submit refill requests through "[employer]" or call your pharmacy and they will forward the refill request to us. Please allow 3 business days for your refill to be completed.           Additional Information About Your Visit        MyChart Information     [employer] gives you secure access to your electronic health record. If you see a primary care provider, you can also send messages to your care team and make appointments. If you have questions, please call your primary care clinic.  If you do not have a primary care provider, please call [phone] and they will assist you.        Care EveryWhere ID     This is your Care EveryWhere ID. This could be used by other organizations to access your [facility] medical records  [id number]        Your Vitals Were     Pulse Temperature Respirations Pulse Oximetry BMI (Body Mass Index)       48 98  F (36.7  C) (Oral) 18 100% 27.73 kg/m2        Blood Pressure from Last 3 Encounters:   05/17/18 131/81   05/10/18 120/84   02/15/18 135/81    Weight from Last 3 Encounters:   05/17/18 216 lb (98 kg)   02/15/18 215 lb (97.5 kg)   02/13/18 215 lb (97.5 kg)                 Today's Medication Changes          These changes are accurate as of 5/17/18  4:01 PM.  If you have any questions, ask your nurse or doctor.               Stop taking these medicines if you haven't already. Please contact your care team if you have questions.     amphetamine-dextroamphetamine 20 MG per 24 hr capsule   Commonly known as:  ADDERALL XR   Stopped by:  Rohan Busby MD                    Primary Care Provider Office Phone # Fax #    Ryan Nico Johnston [phone] [address] [address]        Equal Access to Services     FROYLAN BAKER : Hadii aad ku hadasho Soomaali, waaxda luqadaha, qaybta kaalmada adeegyada, kaya mcnamara . So [facility] [phone].    ATENCIÓN: Si habla español, tiene a forrester disposición servicios gratuitos de asistencia lingüística. Llame al [phone].    We comply with applicable federal civil rights laws and [place] laws. We do not discriminate on the basis of race, color, national origin,  age, disability, sex, sexual orientation, or gender identity.            Thank you!     Thank you for choosing [facility]3] [facility]  for your care. Our goal is always to provide you with excellent care. Hearing back from our patients is one way we can continue to improve our services. Please take a few minutes to complete the written survey that you may receive in the mail after your visit with us. Thank you!             Your Updated Medication List - Protect others around you: Learn how to safely use, store and throw away your medicines at www.disposemymeds.org.          This list is accurate as of 5/17/18  4:01 PM.  Always use your most recent med list.                   Brand Name Dispense Instructions for use Diagnosis    MELATONIN PO      Take by mouth At Bedtime 6 mg        methocarbamol 500 MG tablet    ROBAXIN    60 tablet    Take 1-2 tablets (500-1,000 mg) by mouth 3 times daily as needed for muscle spasms    Back muscle spasm       Multi-vitamin Tabs tablet      Take 1 tablet by mouth daily        OMEPRAZOLE PO      Take 20 mg by mouth every morning        traMADol 50 MG tablet    ULTRAM    120 tablet    Take 1-2 tablets ( mg) by mouth every 6 hours as needed for moderate pain (max of 4 per day.) 30 day supply    SI (sacroiliac) joint dysfunction       zolpidem 10 MG tablet    AMBIEN    30 tablet    Take 1 tablet (10 mg) by mouth nightly as needed for sleep Don't take within 4 hours of tramadol or with ALCOHOL    Other insomnia

## 2018-05-18 RX ORDER — ZOLPIDEM TARTRATE 10 MG/1
10 TABLET ORAL
Qty: 30 TABLET | Refills: 2 | Status: SHIPPED | OUTPATIENT
Start: 2018-05-23 | End: 2018-08-14

## 2018-05-18 NOTE — TELEPHONE ENCOUNTER
Message from [employer]:  Original authorizing provider: MD Abdias Martin would like a refill of the following medications:  zolpidem (AMBIEN) 10 MG tablet [Ryan Johnston MD]    Preferred pharmacy: [id number] - [place], [place] - [address] [address]    Comment:  NA

## 2018-05-18 NOTE — TELEPHONE ENCOUNTER
Controlled Substance Refill Request for ambien  Problem List Complete:  No       PROVIDER TO CONSIDER COMPLETION OF PROBLEM LIST AND OVERVIEW/CONTROLLED SUBSTANCE AGREEMENT      Last Written Prescription Date:  Ambien 4/24/18   Last Fill Quantity: 30,   # refills: 0      Last Office Visit with [facility] primary care provider: 2/15/1/      Future Office visit:       Controlled substance agreement on file: No.      Processing:  Fax Rx to [facility] pharmacy       checked in past 6 months?  Yes 5/18/18  Jeimy RAMIREZN, RN

## 2018-05-25 ENCOUNTER — MYC MEDICAL ADVICE (OUTPATIENT)
Dept: FAMILY MEDICINE | Facility: CLINIC | Age: 43
End: 2018-05-25

## 2018-05-25 NOTE — TELEPHONE ENCOUNTER
See previous messages this year about testing and advise to see specialists.  To provider to advise on adderall request.  Jeimy RAMIREZN, RN

## 2018-06-11 PROBLEM — F90.9 ADHD (ATTENTION DEFICIT HYPERACTIVITY DISORDER): Status: ACTIVE | Noted: 2018-06-11

## 2018-06-12 ENCOUNTER — OFFICE VISIT (OUTPATIENT)
Dept: SLEEP MEDICINE | Facility: CLINIC | Age: 43
End: 2018-06-12
Payer: COMMERCIAL

## 2018-06-12 VITALS
HEART RATE: 101 BPM | BODY MASS INDEX: 27.21 KG/M2 | HEIGHT: 74 IN | DIASTOLIC BLOOD PRESSURE: 85 MMHG | WEIGHT: 212 LBS | OXYGEN SATURATION: 100 % | SYSTOLIC BLOOD PRESSURE: 124 MMHG

## 2018-06-12 DIAGNOSIS — R53.83 FATIGUE, UNSPECIFIED TYPE: ICD-10-CM

## 2018-06-12 DIAGNOSIS — E53.8 VITAMIN B12 DEFICIENCY WITHOUT ANEMIA: ICD-10-CM

## 2018-06-12 DIAGNOSIS — F51.04 INSOMNIA, PSYCHOPHYSIOLOGICAL: ICD-10-CM

## 2018-06-12 DIAGNOSIS — G89.4 CHRONIC PAIN SYNDROME: Chronic | ICD-10-CM

## 2018-06-12 DIAGNOSIS — G89.4 CHRONIC PAIN SYNDROME: Primary | Chronic | ICD-10-CM

## 2018-06-12 LAB
ALBUMIN SERPL-MCNC: 4.3 G/DL (ref 3.4–5)
ALP SERPL-CCNC: 102 U/L (ref 40–150)
ALT SERPL W P-5'-P-CCNC: 46 U/L (ref 0–70)
ANION GAP SERPL CALCULATED.3IONS-SCNC: 8 MMOL/L (ref 3–14)
AST SERPL W P-5'-P-CCNC: 34 U/L (ref 0–45)
BASOPHILS # BLD AUTO: 0 10E9/L (ref 0–0.2)
BASOPHILS NFR BLD AUTO: 0.3 %
BILIRUB SERPL-MCNC: 0.6 MG/DL (ref 0.2–1.3)
BUN SERPL-MCNC: 16 MG/DL (ref 7–30)
CALCIUM SERPL-MCNC: 9 MG/DL (ref 8.5–10.1)
CHLORIDE SERPL-SCNC: 104 MMOL/L (ref 94–109)
CO2 SERPL-SCNC: 29 MMOL/L (ref 20–32)
CREAT SERPL-MCNC: 1.21 MG/DL (ref 0.66–1.25)
DIFFERENTIAL METHOD BLD: NORMAL
EOSINOPHIL # BLD AUTO: 0 10E9/L (ref 0–0.7)
EOSINOPHIL NFR BLD AUTO: 0.5 %
ERYTHROCYTE [DISTWIDTH] IN BLOOD BY AUTOMATED COUNT: 12.3 % (ref 10–15)
GFR SERPL CREATININE-BSD FRML MDRD: 66 ML/MIN/1.7M2
GLUCOSE SERPL-MCNC: 96 MG/DL (ref 70–99)
HCT VFR BLD AUTO: 43.6 % (ref 40–53)
HGB BLD-MCNC: 15 G/DL (ref 13.3–17.7)
LYMPHOCYTES # BLD AUTO: 1.6 10E9/L (ref 0.8–5.3)
LYMPHOCYTES NFR BLD AUTO: 20.8 %
MCH RBC QN AUTO: 29.9 PG (ref 26.5–33)
MCHC RBC AUTO-ENTMCNC: 34.4 G/DL (ref 31.5–36.5)
MCV RBC AUTO: 87 FL (ref 78–100)
MONOCYTES # BLD AUTO: 0.6 10E9/L (ref 0–1.3)
MONOCYTES NFR BLD AUTO: 7.2 %
NEUTROPHILS # BLD AUTO: 5.5 10E9/L (ref 1.6–8.3)
NEUTROPHILS NFR BLD AUTO: 71.2 %
PLATELET # BLD AUTO: 384 10E9/L (ref 150–450)
POTASSIUM SERPL-SCNC: 3.8 MMOL/L (ref 3.4–5.3)
PROT SERPL-MCNC: 7.6 G/DL (ref 6.8–8.8)
RBC # BLD AUTO: 5.02 10E12/L (ref 4.4–5.9)
SODIUM SERPL-SCNC: 141 MMOL/L (ref 133–144)
TSH SERPL DL<=0.005 MIU/L-ACNC: 0.68 MU/L (ref 0.4–4)
WBC # BLD AUTO: 7.7 10E9/L (ref 4–11)

## 2018-06-12 PROCEDURE — 99214 OFFICE O/P EST MOD 30 MIN: CPT | Performed by: INTERNAL MEDICINE

## 2018-06-12 PROCEDURE — 80053 COMPREHEN METABOLIC PANEL: CPT | Performed by: INTERNAL MEDICINE

## 2018-06-12 PROCEDURE — 84443 ASSAY THYROID STIM HORMONE: CPT | Performed by: INTERNAL MEDICINE

## 2018-06-12 PROCEDURE — 85025 COMPLETE CBC W/AUTO DIFF WBC: CPT | Performed by: INTERNAL MEDICINE

## 2018-06-12 PROCEDURE — 36415 COLL VENOUS BLD VENIPUNCTURE: CPT | Performed by: INTERNAL MEDICINE

## 2018-06-12 NOTE — MR AVS SNAPSHOT
After Visit Summary   6/12/2018    Doug Galicia    MRN: [id number]           Patient Information     Date Of Birth          1975        Visit Information        Provider Department      6/12/2018 1:00 PM José Miguel Javier MD [facility]        Today's Diagnoses     Chronic pain syndrome    -  1    Insomnia, psychophysiological        Vitamin B12 deficiency without anemia        Fatigue, unspecified type          Care Instructions      Your BMI is Body mass index is 27.22 kg/(m^2).  Weight management is a personal decision.  If you are interested in exploring weight loss strategies, the following discussion covers the approaches that may be successful. Body mass index (BMI) is one way to tell whether you are at a healthy weight, overweight, or obese. It measures your weight in relation to your height.  A BMI of 18.5 to 24.9 is in the healthy range. A person with a BMI of 25 to 29.9 is considered overweight, and someone with a BMI of 30 or greater is considered obese. More than two-thirds of American adults are considered overweight or obese.  Being overweight or obese increases the risk for further weight gain. Excess weight may lead to heart disease and diabetes.  Creating and following plans for healthy eating and physical activity may help you improve your health.  Weight control is part of healthy lifestyle and includes exercise, emotional health, and healthy eating habits. Careful eating habits lifelong are the mainstay of weight control. Though there are significant health benefits from weight loss, long-term weight loss with diet alone may be very difficult to achieve- studies show long-term success with dietary management in less than 10% of people. Attaining a healthy weight may be especially difficult to achieve in those with severe obesity. In some cases, medications, devices and surgical management might be considered.  What can you do?  If you are overweight or obese and  are interested in methods for weight loss, you should discuss this with your provider.     Consider reducing daily calorie intake by 500 calories.     Keep a food journal.     Avoiding skipping meals, consider cutting portions instead.    Diet combined with exercise helps maintain muscle while optimizing fat loss. Strength training is particularly important for building and maintaining muscle mass. Exercise helps reduce stress, increase energy, and improves fitness. Increasing exercise without diet control, however, may not burn enough calories to loose weight.       Start walking three days a week 10-20 minutes at a time    Work towards walking thirty minutes five days a week     Eventually, increase the speed of your walking for 1-2 minutes at time    In addition, we recommend that you review healthy lifestyles and methods for weight loss available through the National Institutes of Health patient information sites:  http://win.niddk.nih.gov/publications/index.htm    And look into health and wellness programs that may be available through your health insurance provider, employer, local community center, or mirtha club.    Weight management plan: Patient was referred to their PCP to discuss a diet and exercise plan.              Follow-ups after your visit        Follow-up notes from your care team     Return in about 3 months (around 9/12/2018).      Your next 10 appointments already scheduled     Jun 12, 2018  2:00 PM CDT   LAB with BK LAB   [facility] ([facility]    [address] [place] [id number] [phone]           Please do not eat 10-12 hours before your appointment if you are coming in fasting for labs on lipids, cholesterol, or glucose (sugar). This does not apply to pregnant women. Water, hot tea and black coffee (with nothing added) are okay. Do not drink other fluids, diet soda or chew gum.              Future tests that were ordered for  "you today     Open Future Orders        Priority Expected Expires Ordered    CBC with platelets differential Routine  7/13/2018 6/12/2018    Comprehensive metabolic panel Routine  7/13/2018 6/12/2018    TSH with free T4 reflex Routine  7/13/2018 6/12/2018            Who to contact     If you have questions or need follow up information about today's clinic visit or your schedule please contact [facility] directly at [phone].  Normal or non-critical lab and imaging results will be communicated to you by Hirihart, letter or phone within 4 business days after the clinic has received the results. If you do not hear from us within 7 days, please contact the clinic through Lucky Sort or phone. If you have a critical or abnormal lab result, we will notify you by phone as soon as possible.  Submit refill requests through Lucky Sort or call your pharmacy and they will forward the refill request to us. Please allow 3 business days for your refill to be completed.          Additional Information About Your Visit        Lucky Sort Information     Lucky Sort gives you secure access to your electronic health record. If you see a primary care provider, you can also send messages to your care team and make appointments. If you have questions, please call your primary care clinic.  If you do not have a primary care provider, please call [phone] and they will assist you.        Care EveryWhere ID     This is your Care EveryWhere ID. This could be used by other organizations to access your [place] medical records  [id number]        Your Vitals Were     Pulse Height Pulse Oximetry BMI (Body Mass Index)          101 1.88 m (6' 2\") 100% 27.22 kg/m2         Blood Pressure from Last 3 Encounters:   06/12/18 124/85   05/17/18 131/81   05/10/18 120/84    Weight from Last 3 Encounters:   06/12/18 96.2 kg (212 lb)   05/17/18 98 kg (216 lb)   02/15/18 97.5 kg (215 lb)              We Performed the Following     SLEEP EVALUATION " & MANAGEMENT REFERRAL - [facility]  [phone] (Age 15 and up)        Primary Care Provider Office Phone # Fax #    Ryan Johnston [phone] [id number] [facility] [address]        Equal Access to Services     FROYLAN BAKER : Hadii aad ku hadasho Soomaali, waaxda luqadaha, qaybta kaalmada adeegyada, waxelzbieta mac. So [facility] [phone].    ATENCIÓN: Si habla español, tiene a forrester disposición servicios gratuitos de asistencia lingüística. Lorenzo al [phone].    We comply with applicable federal civil rights laws and [place] laws. We do not discriminate on the basis of race, color, national origin, age, disability, sex, sexual orientation, or gender identity.            Thank you!     Thank you for choosing [facility]  for your care. Our goal is always to provide you with excellent care. Hearing back from our patients is one way we can continue to improve our services. Please take a few minutes to complete the written survey that you may receive in the mail after your visit with us. Thank you!             Your Updated Medication List - Protect others around you: Learn how to safely use, store and throw away your medicines at www.disposemymeds.org.          This list is accurate as of 6/12/18  1:52 PM.  Always use your most recent med list.                   Brand Name Dispense Instructions for use Diagnosis    MELATONIN PO      Take by mouth At Bedtime 6 mg        methocarbamol 500 MG tablet    ROBAXIN    60 tablet    Take 1-2 tablets (500-1,000 mg) by mouth 3 times daily as needed for muscle spasms    Back muscle spasm       Multi-vitamin Tabs tablet      Take 1 tablet by mouth daily        OMEPRAZOLE PO      Take 20 mg by mouth every morning        traMADol 50 MG tablet    ULTRAM    120 tablet    Take 1-2 tablets ( mg) by mouth every 6 hours as needed for moderate pain (max of 4 per day.) 30 day  supply    SI (sacroiliac) joint dysfunction       zolpidem 10 MG tablet    AMBIEN    30 tablet    Take 1 tablet (10 mg) by mouth nightly as needed for sleep Don't take within 4 hours of tramadol or with ALCOHOL    Other insomnia

## 2018-06-12 NOTE — NURSING NOTE
"Chief Complaint   Patient presents with     Sleep Problem     poss narcolepsy       Initial /85  Pulse 101  Ht 1.88 m (6' 2\")  Wt 96.2 kg (212 lb)  SpO2 100%  BMI 27.22 kg/m2 Estimated body mass index is 27.22 kg/(m^2) as calculated from the following:    Height as of this encounter: 1.88 m (6' 2\").    Weight as of this encounter: 96.2 kg (212 lb).    Medication Reconciliation: complete    Neck circumference: 16.5 inches / 42 centimeters.      "

## 2018-06-12 NOTE — PATIENT INSTRUCTIONS
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
## 2018-06-13 NOTE — PROGRESS NOTES
Cell counts are normal.  Kidney function normal.  Liver function normal.  Thyroid hormone levels are normal.

## 2018-08-07 ENCOUNTER — MYC MEDICAL ADVICE (OUTPATIENT)
Dept: FAMILY MEDICINE | Facility: CLINIC | Age: 43
End: 2018-08-07

## 2018-08-08 DIAGNOSIS — G47.09 OTHER INSOMNIA: ICD-10-CM

## 2018-08-08 RX ORDER — ZOLPIDEM TARTRATE 10 MG/1
TABLET ORAL
Qty: 30 TABLET | Refills: 1 | OUTPATIENT
Start: 2018-08-08

## 2018-08-13 ENCOUNTER — MYC MEDICAL ADVICE (OUTPATIENT)
Dept: FAMILY MEDICINE | Facility: CLINIC | Age: 43
End: 2018-08-13

## 2018-08-13 DIAGNOSIS — G47.09 OTHER INSOMNIA: ICD-10-CM

## 2018-08-14 RX ORDER — ZOLPIDEM TARTRATE 10 MG/1
10 TABLET ORAL
Qty: 30 TABLET | Refills: 2 | Status: SHIPPED | OUTPATIENT
Start: 2018-08-14 | End: 2018-09-04

## 2018-08-20 ENCOUNTER — OFFICE VISIT (OUTPATIENT)
Dept: FAMILY MEDICINE | Facility: CLINIC | Age: 43
End: 2018-08-20
Payer: COMMERCIAL

## 2018-08-20 VITALS
WEIGHT: 205.4 LBS | OXYGEN SATURATION: 100 % | BODY MASS INDEX: 26.37 KG/M2 | HEART RATE: 95 BPM | DIASTOLIC BLOOD PRESSURE: 80 MMHG | TEMPERATURE: 97.3 F | SYSTOLIC BLOOD PRESSURE: 125 MMHG

## 2018-08-20 DIAGNOSIS — Z72.0 TOBACCO ABUSE: ICD-10-CM

## 2018-08-20 DIAGNOSIS — J01.90 ACUTE SINUSITIS WITH SYMPTOMS > 10 DAYS: Primary | ICD-10-CM

## 2018-08-20 PROCEDURE — 99214 OFFICE O/P EST MOD 30 MIN: CPT | Performed by: PHYSICIAN ASSISTANT

## 2018-08-20 RX ORDER — AZITHROMYCIN 250 MG/1
TABLET, FILM COATED ORAL
Qty: 6 TABLET | Refills: 0 | Status: SHIPPED | OUTPATIENT
Start: 2018-08-20 | End: 2018-10-08

## 2018-08-20 NOTE — MR AVS SNAPSHOT
After Visit Summary   8/20/2018    Doug Galicia    MRN: [id number]           Patient Information     Date Of Birth          1975        Visit Information        Provider Department      8/20/2018 12:40 PM Blanche Barrera PA-C [facility]        Today's Diagnoses     Acute sinusitis with symptoms > 10 days    -  1    Tobacco abuse          Care Instructions    Start Chantix as soon as you get the prescription.   Please follow up with me in 3 months if you haven't quit smoking.           Follow-ups after your visit        Who to contact     Normal or non-critical lab and imaging results will be communicated to you by Hyperoptichart, letter or phone within 4 business days after the clinic has received the results. If you do not hear from us within 7 days, please contact the clinic through Canal do Creditot or phone. If you have a critical or abnormal lab result, we will notify you by phone as soon as possible.  Submit refill requests through [email or web address] or call your pharmacy and they will forward the refill request to us. Please allow 3 business days for your refill to be completed.          If you need to speak with a  for additional information , please call: [phone]             Additional Information About Your Visit        HyperopticharKeduo Information     Luma.io gives you secure access to your electronic health record. If you see a primary care provider, you can also send messages to your care team and make appointments. If you have questions, please call your primary care clinic.  If you do not have a primary care provider, please call [phone] and they will assist you.        Care EveryWhere ID     This is your Care EveryWhere ID. This could be used by other organizations to access your [place] medical records  [id number]        Your Vitals Were     Pulse Temperature Pulse Oximetry BMI (Body Mass Index)          95 97.3  F (36.3  C) (Tympanic) 100% 26.37 kg/m2         Blood  Pressure from Last 3 Encounters:   08/20/18 125/80   06/12/18 124/85   05/17/18 131/81    Weight from Last 3 Encounters:   08/20/18 205 lb 6.4 oz (93.2 kg)   06/12/18 212 lb (96.2 kg)   05/17/18 216 lb (98 kg)              Today, you had the following     No orders found for display         Today's Medication Changes          These changes are accurate as of 8/20/18  1:05 PM.  If you have any questions, ask your nurse or doctor.               Start taking these medicines.        Dose/Directions    azithromycin 250 MG tablet   Commonly known as:  ZITHROMAX   Used for:  Acute sinusitis with symptoms > 10 days   Started by:  Blanche Barrera PA-C        Two tablets first day, then one tablet daily for four days.   Quantity:  6 tablet   Refills:  0       varenicline 0.5 MG X 11 & 1 MG X 42 tablet   Commonly known as:  CHANTIX STARTING MONTH PAK   Used for:  Tobacco abuse   Started by:  Blanche Barrera PA-C        Take 0.5 mg tab daily for 3 days, then 0.5 mg tab twice daily for 4 days, then 1 mg twice daily.   Quantity:  60 tablet   Refills:  2            Where to get your medicines      These medications were sent to [id number] - [place] - [address] [address]  [address] [address] [place] [address]    Hours:  test fax successful 4/5/04  Phone:  [phone]     azithromycin 250 MG tablet    varenicline 0.5 MG X 11 & 1 MG X 42 tablet                Primary Care Provider Office Phone # Fax #    Ryan Johnston [phone] [address] [facility] [address]        Equal Access to Services     CELESTE BAKER AH: Carl Doardo, didi lorenz, nancy callahanalhimanshu sosa, kaya mac. So [facility] [phone].    ATENCIÓN: Si habla español, tiene a forrester disposición servicios gratuitos de asistencia lingüística. Llame al [phone].    We comply with applicable federal civil rights laws and [place] laws. We do not  discriminate on the basis of race, color, national origin, age, disability, sex, sexual orientation, or gender identity.            Thank you!     Thank you for choosing [facility]  for your care. Our goal is always to provide you with excellent care. Hearing back from our patients is one way we can continue to improve our services. Please take a few minutes to complete the written survey that you may receive in the mail after your visit with us. Thank you!             Your Updated Medication List - Protect others around you: Learn how to safely use, store and throw away your medicines at www.disposemymeds.org.          This list is accurate as of 8/20/18  1:05 PM.  Always use your most recent med list.                   Brand Name Dispense Instructions for use Diagnosis    azithromycin 250 MG tablet    ZITHROMAX    6 tablet    Two tablets first day, then one tablet daily for four days.    Acute sinusitis with symptoms > 10 days       MELATONIN PO      Take by mouth At Bedtime 6 mg        methocarbamol 500 MG tablet    ROBAXIN    60 tablet    Take 1-2 tablets (500-1,000 mg) by mouth 3 times daily as needed for muscle spasms    Back muscle spasm       Multi-vitamin Tabs tablet      Take 1 tablet by mouth daily        OMEPRAZOLE PO      Take 20 mg by mouth every morning        traMADol 50 MG tablet    ULTRAM    120 tablet    Take 1-2 tablets ( mg) by mouth every 6 hours as needed for moderate pain (max of 4 per day.) 30 day supply    SI (sacroiliac) joint dysfunction       varenicline 0.5 MG X 11 & 1 MG X 42 tablet    CHANTIX STARTING MONTH SUSAN    60 tablet    Take 0.5 mg tab daily for 3 days, then 0.5 mg tab twice daily for 4 days, then 1 mg twice daily.    Tobacco abuse       zolpidem 10 MG tablet    AMBIEN    30 tablet    Take 1 tablet (10 mg) by mouth nightly as needed for sleep Don't take within 4 hours of tramadol or with ALCOHOL    Other insomnia

## 2018-08-20 NOTE — PROGRESS NOTES
SUBJECTIVE:  Doug Galicia is a 42 year old male who presents with the following concerns;              Symptoms: cc Present Absent Comment   Fever/Chills   x    Fatigue   x    Muscle Aches   x    Eye Irritation  x  Watery    Sneezing   x    Nasal James/Drg  x     Sinus Pressure/Pain  x     Loss of smell   x    Dental pain   x    Sore Throat   x    Swollen Glands   x    Ear Pain/Fullness   x    Cough   x    Wheeze   x    Chest Pain   x    Shortness of breath   x    Rash   x    Other   x      Symptom duration:  x2-3 weeks   Sympom severity:  worsening   Treatments tried:  none   Contacts:  kids have had colds       Uses smokeless tobacco but would like to quit this as well  Interested in Chantix  Patches have not worked and they fell off all the time      Medications updated and reviewed.  Past, family and surgical history is updated and reviewed in the record.    ROS:  Other than noted above, general, HEENT, respiratory, cardiac and gastrointestinal systems are negative.    OBJECTIVE:  /80  Pulse 95  Temp 97.3  F (36.3  C) (Tympanic)  Wt 205 lb 6.4 oz (93.2 kg)  SpO2 100%  BMI 26.37 kg/m2  GENERAL: Pleasant and interactive. No acute distress.  HEENT: Mild injection of conjunctiva. TMs clear.  Oropharynx moist and clear.   NECK: supple and free of adenopathy or masses, the thyroid is normal without enlargement or nodules.  CHEST:  clear, no wheezing or rales. Normal symmetric air entry throughout both lung fields. No chest wall deformities or tenderness.  HEART:  S1 and S2 normal, no murmurs, clicks, gallops or rubs. Regular rate and rhythm.  SKIN:  Only benign skin findings. No unusual rashes or suspicious skin lesions noted. Nails appear normal.    Assessment:    Encounter Diagnoses   Name Primary?     Acute sinusitis with symptoms > 10 days Yes     Tobacco abuse      Plan:   Orders Placed This Encounter     azithromycin (ZITHROMAX) 250 MG tablet     varenicline (CHANTIX STARTING MONTH PAK) 0.5 MG X 11  & 1 MG X 42 tablet       Z-pack. Supportive therapy also discussed. Follow up if symptoms fail to improve or worsen.      Patient would like to try Chantix for smoking cessation. We discussed this medication, it's typical course, and possible s/e. Follow up if he noted s/e or if it's not helpful.    The patient was in agreement with the plan today and had no questions or concerns prior to leaving the clinic.     Blanche Barrera PA-C

## 2018-08-20 NOTE — PATIENT INSTRUCTIONS
Start Chantix as soon as you get the prescription.   Please follow up with me in 3 months if you haven't quit smoking.

## 2018-08-28 ENCOUNTER — OFFICE VISIT (OUTPATIENT)
Dept: FAMILY MEDICINE | Facility: CLINIC | Age: 43
End: 2018-08-28
Payer: COMMERCIAL

## 2018-08-28 VITALS
OXYGEN SATURATION: 100 % | WEIGHT: 210 LBS | DIASTOLIC BLOOD PRESSURE: 79 MMHG | TEMPERATURE: 96.9 F | RESPIRATION RATE: 18 BRPM | SYSTOLIC BLOOD PRESSURE: 119 MMHG | HEART RATE: 81 BPM | BODY MASS INDEX: 26.96 KG/M2

## 2018-08-28 DIAGNOSIS — M53.3 SI (SACROILIAC) JOINT DYSFUNCTION: Primary | ICD-10-CM

## 2018-08-28 DIAGNOSIS — G89.29 CHRONIC BILATERAL LOW BACK PAIN, WITH SCIATICA PRESENCE UNSPECIFIED: Chronic | ICD-10-CM

## 2018-08-28 DIAGNOSIS — M54.5 CHRONIC BILATERAL LOW BACK PAIN, WITH SCIATICA PRESENCE UNSPECIFIED: Chronic | ICD-10-CM

## 2018-08-28 DIAGNOSIS — G89.4 CHRONIC PAIN SYNDROME: Chronic | ICD-10-CM

## 2018-08-28 DIAGNOSIS — M62.830 BACK MUSCLE SPASM: ICD-10-CM

## 2018-08-28 DIAGNOSIS — K21.9 GASTROESOPHAGEAL REFLUX DISEASE, ESOPHAGITIS PRESENCE NOT SPECIFIED: ICD-10-CM

## 2018-08-28 DIAGNOSIS — F51.04 INSOMNIA, PSYCHOPHYSIOLOGICAL: ICD-10-CM

## 2018-08-28 PROCEDURE — 80307 DRUG TEST PRSMV CHEM ANLYZR: CPT | Mod: 90 | Performed by: PHYSICIAN ASSISTANT

## 2018-08-28 PROCEDURE — 99214 OFFICE O/P EST MOD 30 MIN: CPT | Performed by: PHYSICIAN ASSISTANT

## 2018-08-28 PROCEDURE — 99000 SPECIMEN HANDLING OFFICE-LAB: CPT | Performed by: PHYSICIAN ASSISTANT

## 2018-08-28 RX ORDER — METHOCARBAMOL 500 MG/1
500-1000 TABLET, FILM COATED ORAL 3 TIMES DAILY PRN
Qty: 60 TABLET | Refills: 5 | Status: SHIPPED | OUTPATIENT
Start: 2018-08-28 | End: 2021-05-25

## 2018-08-28 RX ORDER — TRAMADOL HYDROCHLORIDE 50 MG/1
50-100 TABLET ORAL EVERY 6 HOURS PRN
Qty: 120 TABLET | Refills: 5 | Status: SHIPPED | OUTPATIENT
Start: 2018-08-28 | End: 2019-03-18

## 2018-08-28 NOTE — MR AVS SNAPSHOT
After Visit Summary   8/28/2018    Doug Galicia    MRN: [id number]           Patient Information     Date Of Birth          1975        Visit Information        Provider Department      8/28/2018 12:00 PM Blanche Barrera PA-C [facility]        Today's Diagnoses     SI (sacroiliac) joint dysfunction    -  1    Back muscle spasm        Insomnia, psychophysiological        Chronic pain syndrome        Chronic bilateral low back pain, with sciatica presence unspecified        Gastroesophageal reflux disease, esophagitis presence not specified          Care Instructions    We'll plan to follow up in the clinic every 6 months for refills.   Every 3 months in between clinic visits we'll follow up online with an e-visit.            Follow-ups after your visit        Follow-up notes from your care team     Return in about 3 months (around 11/28/2018) for an e-visit for chronic pain.      Who to contact     Normal or non-critical lab and imaging results will be communicated to you by Nuevolutionhart, letter or phone within 4 business days after the clinic has received the results. If you do not hear from us within 7 days, please contact the clinic through Nuevolutionhart or phone. If you have a critical or abnormal lab result, we will notify you by phone as soon as possible.  Submit refill requests through Suncore or call your pharmacy and they will forward the refill request to us. Please allow 3 business days for your refill to be completed.          If you need to speak with a  for additional information , please call: [phone]             Additional Information About Your Visit        Nuevolutionhart Information     Suncore gives you secure access to your electronic health record. If you see a primary care provider, you can also send messages to your care team and make appointments. If you have questions, please call your primary care clinic.  If you do not have a primary care  provider, please call [phone] and they will assist you.        Care EveryWhere ID     This is your Care EveryWhere ID. This could be used by other organizations to access your [place] medical records  [id number]        Your Vitals Were     Pulse Temperature Respirations Pulse Oximetry BMI (Body Mass Index)       81 96.9  F (36.1  C) (Tympanic) 18 100% 26.96 kg/m2        Blood Pressure from Last 3 Encounters:   08/28/18 119/79   08/20/18 125/80   06/12/18 124/85    Weight from Last 3 Encounters:   08/28/18 210 lb (95.3 kg)   08/20/18 205 lb 6.4 oz (93.2 kg)   06/12/18 212 lb (96.2 kg)              We Performed the Following     Drug  Screen Comprehensive , Urine with Reported Meds (MedTox) (Pain Care Package)          Today's Medication Changes          These changes are accurate as of 8/28/18 12:33 PM.  If you have any questions, ask your nurse or doctor.               These medicines have changed or have updated prescriptions.        Dose/Directions    omeprazole 20 MG CR capsule   Commonly known as:  priLOSEC   This may have changed:    - medication strength  - when to take this   Used for:  Gastroesophageal reflux disease, esophagitis presence not specified   Changed by:  Blanche Barrera PA-C        Dose:  20 mg   Take 1 capsule (20 mg) by mouth daily   Quantity:  90 capsule   Refills:  3       traMADol 50 MG tablet   Commonly known as:  ULTRAM   This may have changed:  additional instructions   Used for:  SI (sacroiliac) joint dysfunction, Chronic pain syndrome, Chronic bilateral low back pain, with sciatica presence unspecified   Changed by:  Blanche Barrera PA-C        Dose:   mg   Take 1-2 tablets ( mg) by mouth every 6 hours as needed for moderate pain (max of 4 per day.) - This is a 1 month supply   Quantity:  120 tablet   Refills:  5            Where to get your medicines      These medications were sent to [id number] - [place] [facility] [address] [address]   [address] [place] [address]    Hours:  test fax successful 4/5/04  Phone:  [phone]     methocarbamol 500 MG tablet    omeprazole 20 MG CR capsule         Some of these will need a paper prescription and others can be bought over the counter.  Ask your nurse if you have questions.     Bring a paper prescription for each of these medications     traMADol 50 MG tablet               Information about OPIOIDS     PRESCRIPTION OPIOIDS: WHAT YOU NEED TO KNOW   We gave you an opioid (narcotic) pain medicine. It is important to manage your pain, but opioids are not always the best choice. You should first try all the other options your care team gave you. Take this medicine for as short a time (and as few doses) as possible.    Some activities can increase your pain, such as bandage changes or therapy sessions. It may help to take your pain medicine 30 to 60 minutes before these activities. Reduce your stress by getting enough sleep, working on hobbies you enjoy and practicing relaxation or meditation. Talk to your care team about ways to manage your pain beyond prescription opioids.    These medicines have risks:    DO NOT drive when on new or higher doses of pain medicine. These medicines can affect your alertness and reaction times, and you could be arrested for driving under the influence (DUI). If you need to use opioids long-term, talk to your care team about driving.    DO NOT operate heavy machinery    DO NOT do any other dangerous activities while taking these medicines.    DO NOT drink any alcohol while taking these medicines.     If the opioid prescribed includes acetaminophen, DO NOT take with any other medicines that contain acetaminophen. Read all labels carefully. Look for the word  acetaminophen  or  Tylenol.  Ask your pharmacist if you have questions or are unsure.    You can get addicted to pain medicines, especially if you have a history of addiction (chemical, alcohol or substance  dependence). Talk to your care team about ways to reduce this risk.    All opioids tend to cause constipation. Drink plenty of water and eat foods that have a lot of fiber, such as fruits, vegetables, prune juice, apple juice and high-fiber cereal. Take a laxative (Miralax, milk of magnesia, Colace, Senna) if you don t move your bowels at least every other day. Other side effects include upset stomach, sleepiness, dizziness, throwing up, tolerance (needing more of the medicine to have the same effect), physical dependence and slowed breathing.    Store your pills in a secure place, locked if possible. We will not replace any lost or stolen medicine. If you don t finish your medicine, please throw away (dispose) as directed by your pharmacist. The [place] [employer]ution [employer] has more information about safe disposal: [email or web address]://[email or web address].[facility].us/living-green/managing-unwanted-medications         Primary Care Provider Office Phone # Fax #    Ryan Johnston [phone] [address] [facility] [address]        Equal Access to Services     [facility]: Hadii aad ku hadasho Soomaali, waaxda luqadaha, qaybta kaalmada adekarthikeyan, kaya mcnamara . So [facility] [phone].    ATENCIÓN: Si habla español, tiene a forrester disposición servicios gratuitos de asistencia lingüística. Lorenzo al [phone].    We comply with applicable federal civil rights laws and [place] laws. We do not discriminate on the basis of race, color, national origin, age, disability, sex, sexual orientation, or gender identity.            Thank you!     Thank you for choosing [facility]  for your care. Our goal is always to provide you with excellent care. Hearing back from our patients is one way we can continue to improve our services. Please take a few minutes to complete the written survey that you may receive in the mail after your visit with us. Thank you!             Your  Updated Medication List - Protect others around you: Learn how to safely use, store and throw away your medicines at www.disposemymeds.org.          This list is accurate as of 8/28/18 12:33 PM.  Always use your most recent med list.                   Brand Name Dispense Instructions for use Diagnosis    azithromycin 250 MG tablet    ZITHROMAX    6 tablet    Two tablets first day, then one tablet daily for four days.    Acute sinusitis with symptoms > 10 days       MELATONIN PO      Take by mouth At Bedtime 6 mg        methocarbamol 500 MG tablet    ROBAXIN    60 tablet    Take 1-2 tablets (500-1,000 mg) by mouth 3 times daily as needed for muscle spasms    Back muscle spasm       Multi-vitamin Tabs tablet      Take 1 tablet by mouth daily        omeprazole 20 MG CR capsule    priLOSEC    90 capsule    Take 1 capsule (20 mg) by mouth daily    Gastroesophageal reflux disease, esophagitis presence not specified       traMADol 50 MG tablet    ULTRAM    120 tablet    Take 1-2 tablets ( mg) by mouth every 6 hours as needed for moderate pain (max of 4 per day.) - This is a 1 month supply    SI (sacroiliac) joint dysfunction, Chronic pain syndrome, Chronic bilateral low back pain, with sciatica presence unspecified       varenicline 0.5 MG X 11 & 1 MG X 42 tablet    CHANTIX STARTING MONTH SUSAN    60 tablet    Take 0.5 mg tab daily for 3 days, then 0.5 mg tab twice daily for 4 days, then 1 mg twice daily.    Tobacco abuse       zolpidem 10 MG tablet    AMBIEN    30 tablet    Take 1 tablet (10 mg) by mouth nightly as needed for sleep Don't take within 4 hours of tramadol or with ALCOHOL    Other insomnia

## 2018-08-28 NOTE — Clinical Note
"Monica Gonzalez and Chun, I have a patient who is using Ambien 10mg for insomnia. It works well \"most of the time.\" He was inquiring about higher doses, maybe 15mg? Do you ever use higher doses than 10mg?? Thanks for your advice! Blanche "

## 2018-08-28 NOTE — PROGRESS NOTES
"  SUBJECTIVE:   Doug Galicia is a 42 year old male who presents to clinic today for the following health issues:    Establish Care    Medication Followup of Zolpidem (Ambien)    Taking Medication as prescribed: yes    Side Effects:  None    Medication Helping Symptoms:  Yes    Chronic SI/low back pain  Uses tramadol   Denies illicit drug use         PROBLEMS TO ADD ON...  None  -------------------------------------    Problem list and histories reviewed & adjusted, as indicated.  Additional history: as documented    Patient Active Problem List   Diagnosis     SI (sacroiliac) joint dysfunction     Chronic low back pain     Long term current use of opiate analgesic     Vitamin B12 deficiency without anemia     Insomnia, psychophysiological     Chronic pain syndrome     Tourette syndrome     ADHD (attention deficit hyperactivity disorder)     Gastroesophageal reflux disease, esophagitis presence not specified     Past Surgical History:   Procedure Laterality Date     ARTHROSCOPY KNEE RT/LT      right     C NONSPECIFIC PROCEDURE      Testicular Surgery       Social History   Substance Use Topics     Smoking status: Former Smoker     Types: Other     Smokeless tobacco: Current User     Types: Chew      Comment: \"years\" per pt. did not know date     Alcohol use No     Family History   Problem Relation Age of Onset     Colon Cancer Mother       in , diagnosed early 50s     Neurologic Disorder Father      seizures     Diabetes No family hx of      Coronary Artery Disease No family hx of            Reviewed and updated as needed this visit by clinical staff  Tobacco  Allergies  Meds       Reviewed and updated as needed this visit by Provider         ROS:  Constitutional, neuro, gastrointestinal, musculoskeletal systems are negative, except as otherwise noted.    OBJECTIVE:                                                    /79  Pulse 81  Temp 96.9  F (36.1  C) (Tympanic)  Resp 18  Wt 210 lb (95.3 kg)  " SpO2 100%  BMI 26.96 kg/m2  Body mass index is 26.96 kg/(m^2).  GENERAL APPEARANCE: healthy, alert and no distress  MS: extremities normal- no gross deformities noted  NEURO: Normal strength and tone  PSYCH: mentation appears normal and affect normal/bright       ASSESSMENT:                                                      1. SI (sacroiliac) joint dysfunction    2. Back muscle spasm    3. Insomnia, psychophysiological    4. Chronic pain syndrome    5. Chronic bilateral low back pain, with sciatica presence unspecified    6. Gastroesophageal reflux disease, esophagitis presence not specified         PLAN:                                                    Medications renewed today. A 6-mo prescription for tramadol given. UDS today and CSA signed.  Discussed future follow up. Ambien recently renewed for 3 months.    Patient Instructions   We'll plan to follow up in the clinic every 6 months for refills.   Every 3 months in between clinic visits we'll follow up online with an e-visit.       The patient was in agreement with the plan today and had no questions or concerns prior to leaving the clinic.     Blanche Barrera PA-C  [facility]

## 2018-08-28 NOTE — LETTER
[facility]    08/28/18    Patient: Doug Galicia  YOB: 1975  Medical Record Number: [id number]                                                                  Controlled Substance Agreement  I understand that my care provider has prescribed controlled substances (narcotics, tranquilizers, and/or stimulants) to help manage my condition(s).  I am taking this medicine to help me function or work.  I know that this is strong medicine.  It could have serious side effects and even cause a dependency on the drug.  If I stop these medicines suddenly, I could have severe withdrawal symptoms.    The risks, benefits, and side effects of these medication(s) were explained to me.  I agree that:  1. I will take part in other treatments as advised by my provider.  This may be psychiatry or counseling, physical therapy, behavioral therapy, group treatment, or a referral to a pain clinic.  I will reduce or stop my medicine when my provider tells me to do so.   2. I will take my medicines as prescribed.  I will not change the dose or schedule unless my provider tells me to.  There will be no refills if I  run out early.   I may be contacted at any time without warning and asked to complete a drug test or pill count.   3. I will keep all my appointments at the clinic.  If I miss appointments or fail to follow instructions, my provider may stop my medicine.  4. I will not ask other providers to prescribe controlled substances. And I will not accept controlled substances from other people. If I need another prescribed controlled substance for a new reason, I will notify my provider within one business day.  5. If I enroll in the [place] Medical Marijuana program, I will tell my provider.  I will also sign an agreement to share my medical records with my provider.  6. I will use one pharmacy to fill all of my controlled substance prescriptions.  If my prescription is mailed to my pharmacy, it may take 5 to  7 days for my medicine to be ready.  7. I understand that my provider, clinic care team, and pharmacy can track controlled substance prescriptions from other providers through a central database (prescription monitoring program).  8. I will bring in my list of medications (or my medicine bottles) each time I come to the clinic.  [id number] REV-  07/2018                                                                                                                                   Page 1 of 2      [facility]    08/28/18    Patient: Doug Galicia  YOB: 1975  Medical Record Number: [id number]    9. Refills of controlled substances will be made only during office hours.  It is up to me to make sure that I do not run out of my medicines on weekends or holidays.    10. I am responsible for my prescriptions.  If the medicine/prescription is lost or stolen, it will not be replaced.   I also agree not to share these medicines with anyone.  11. I agree to not use ANY illegal or recreational drugs.  This includes marijuana, cocaine, bath salts or other drugs.  I agree not to use alcohol unless my provider says I may.  I agree to give urine samples whenever asked.  If I fail to give a urine sample, the provider may stop my medicine.     12. I will tell my nurse or provider right away if I become pregnant or have a new medical problem treated outside of [facility]  13. I understand that this medicine can affect my thinking and judgment.  It may be unsafe for me to drive, use machinery and do dangerous tasks.  I will not do any of these things until I know how the medicine affects me.  If my dose changes, I will wait to see how it affects me.  I will contact my provider if I have concerns about medicine side effects.  I understand that if I do not follow any of the conditions above, my prescriptions or treatment may be stopped.    I agree that my provider, clinic care team, and pharmacy may work with  any city, state or federal law enforcement agency that investigates the misuse, sale, or other diversion of my controlled medicine. I will allow my provider to discuss my care with or share a copy of this agreement with any other treating provider, pharmacy or emergency room where I receive care.  I agree to give up (waive) any right of privacy or confidentiality with respect to these authorizations.   I have read this agreement and have asked questions about anything I did not understand.   ___________________________________    ___________________________  Patient Signature                                                           Date and Time  ___________________________________     ____________________________  Witness                                                                            Date and Time  ___________________________________  Blanche Barrera PA-C  [id number] REV-  07/2018                                                                                                                                                   Page 2 of 2

## 2018-08-28 NOTE — PATIENT INSTRUCTIONS
We'll plan to follow up in the clinic every 6 months for refills.   Every 3 months in between clinic visits we'll follow up online with an e-visit.

## 2018-08-31 LAB — PAIN DRUG SCR UR W RPTD MEDS: NORMAL

## 2018-09-03 ENCOUNTER — MYC MEDICAL ADVICE (OUTPATIENT)
Dept: FAMILY MEDICINE | Facility: CLINIC | Age: 43
End: 2018-09-03

## 2018-09-03 DIAGNOSIS — G47.09 OTHER INSOMNIA: ICD-10-CM

## 2018-09-04 ENCOUNTER — TELEPHONE (OUTPATIENT)
Dept: FAMILY MEDICINE | Facility: CLINIC | Age: 43
End: 2018-09-04

## 2018-09-04 ENCOUNTER — MYC MEDICAL ADVICE (OUTPATIENT)
Dept: FAMILY MEDICINE | Facility: CLINIC | Age: 43
End: 2018-09-04

## 2018-09-04 RX ORDER — ZOLPIDEM TARTRATE 10 MG/1
10 TABLET ORAL
Qty: 30 TABLET | Refills: 2 | Status: SHIPPED | OUTPATIENT
Start: 2018-09-04 | End: 2018-11-25

## 2018-09-04 NOTE — TELEPHONE ENCOUNTER
Rx for zolpidem faxed to [facility].    Drug use: No    Sexual activity: Not on file     Other Topics Concern    Not on file     Social History Narrative    No narrative on file     Family History:   History reviewed. No pertinent family history. REVIEW OF SYSTEMS:    CONSTITUTIONAL:  negative  MUSCULOSKELETAL:  positive for  pain    PHYSICAL EXAM:    VITALS:  BP (!) 180/90   Pulse 68   Temp 97.8 °F (36.6 °C) (Oral)   Resp 18   Ht 5' 3\" (1.6 m)   Wt 145 lb (65.8 kg)   SpO2 93%   BMI 25.69 kg/m²   CONSTITUTIONAL:  awake, alert, cooperative, no apparent distress, and appears stated age  MUSCULOSKELETAL:  Left shoulder intact, right shoulder shows no deformity although there is tenderness to palpation about the mid to distal right clavicle. Right glenohumeral joint shows good tolerance for active and passive motion with intact cuff functions no effusion or crepitus.  strength and sensation intact right hand. Leg lengths are equal, both hips show full rotation without pain. Patient was able to demonstrate sit to stand transfers and ambulation with mild antalgia on the left side. Bilateral knees show no effusion but correctable varus deformities full range of motion mild pain. DATA:    CBC:   Lab Results   Component Value Date    WBC 10.5 07/20/2018    RBC 5.01 07/20/2018    HGB 14.9 07/20/2018    HCT 44.8 07/20/2018    MCV 89.4 07/20/2018    MCH 29.7 07/20/2018    MCHC 33.3 07/20/2018    RDW 13.5 07/20/2018     07/20/2018    MPV 8.9 07/20/2018       XR SHOULDER RIGHT (MIN 2 VIEWS)   Final Result   Fracture at the junction of the middle and lateral right clavicle with   minimal offset. Probable atelectasis in the lower right chest.      XR HIP LEFT (2-3 VIEWS)   Final Result   Degenerative changes in the left hip joint in mild to   moderate degree. No acute fractures or dislocations in the left hip.       CT HEAD WO CONTRAST   Final Result          IMPRESSION/RECOMMENDATIONS:    Right mid to distal clavicle shaft fracture,

## 2018-09-04 NOTE — TELEPHONE ENCOUNTER
Patient is requesting early refill on his Ambien.  He's 10 days early.  He sais he changed providers and threw away meds from his previous prescriber.  This isnt the first time he's had stories about why he needs early refills on controlled substances.

## 2018-09-04 NOTE — TELEPHONE ENCOUNTER
Pt stated in previous [employer] message from yesterday that he threw his pills away, flushed them mistakenly thinking they were Dr. Johnston prescription. The new script that was sent today is the same as previous and pharmacy wont fill as it is too early, last filled 8/14/18. Pt wanting ok to fill early, may also be an insurance issue.

## 2018-09-05 NOTE — TELEPHONE ENCOUNTER
Spoke with [facility] pharmacist and gave verbal order okay to refill early per Blanche Barrera NP.  Patient my charted with info.

## 2018-09-07 ENCOUNTER — OFFICE VISIT (OUTPATIENT)
Dept: FAMILY MEDICINE | Facility: CLINIC | Age: 43
End: 2018-09-07
Payer: COMMERCIAL

## 2018-09-07 VITALS
OXYGEN SATURATION: 97 % | SYSTOLIC BLOOD PRESSURE: 125 MMHG | DIASTOLIC BLOOD PRESSURE: 86 MMHG | HEART RATE: 98 BPM | WEIGHT: 210.6 LBS | BODY MASS INDEX: 27.04 KG/M2 | TEMPERATURE: 97.5 F | RESPIRATION RATE: 16 BRPM

## 2018-09-07 DIAGNOSIS — F90.9 ATTENTION DEFICIT HYPERACTIVITY DISORDER (ADHD), UNSPECIFIED ADHD TYPE: Primary | ICD-10-CM

## 2018-09-07 DIAGNOSIS — L73.9 FOLLICULITIS: ICD-10-CM

## 2018-09-07 PROCEDURE — 99214 OFFICE O/P EST MOD 30 MIN: CPT | Performed by: PHYSICIAN ASSISTANT

## 2018-09-07 RX ORDER — DEXTROAMPHETAMINE SACCHARATE, AMPHETAMINE ASPARTATE MONOHYDRATE, DEXTROAMPHETAMINE SULFATE AND AMPHETAMINE SULFATE 7.5; 7.5; 7.5; 7.5 MG/1; MG/1; MG/1; MG/1
30 CAPSULE, EXTENDED RELEASE ORAL DAILY
Qty: 30 CAPSULE | Refills: 0 | Status: SHIPPED | OUTPATIENT
Start: 2018-09-07 | End: 2018-10-08

## 2018-09-07 RX ORDER — CLINDAMYCIN PHOSPHATE 11.9 MG/ML
SOLUTION TOPICAL 2 TIMES DAILY
Qty: 60 ML | Refills: 11 | Status: SHIPPED | OUTPATIENT
Start: 2018-09-07 | End: 2018-10-08

## 2018-09-07 NOTE — MR AVS SNAPSHOT
After Visit Summary   9/7/2018    Doug Galicia    MRN: [id number]           Patient Information     Date Of Birth          1975        Visit Information        Provider Department      9/7/2018 2:20 PM Blanche Barrera PA-C [facility]        Today's Diagnoses     Attention deficit hyperactivity disorder (ADHD), unspecified ADHD type    -  1    Folliculitis           Follow-ups after your visit        Follow-up notes from your care team     Return in about 1 month (around 10/7/2018) for a med check.      Who to contact     Normal or non-critical lab and imaging results will be communicated to you by Goomeohart, letter or phone within 4 business days after the clinic has received the results. If you do not hear from us within 7 days, please contact the clinic through [facility] or phone. If you have a critical or abnormal lab result, we will notify you by phone as soon as possible.  Submit refill requests through [employer] or call your pharmacy and they will forward the refill request to us. Please allow 3 business days for your refill to be completed.          If you need to speak with a  for additional information , please call: [phone]             Additional Information About Your Visit        GoomeoharSpotted Information     [employer] gives you secure access to your electronic health record. If you see a primary care provider, you can also send messages to your care team and make appointments. If you have questions, please call your primary care clinic.  If you do not have a primary care provider, please call [phone] and they will assist you.        Care EveryWhere ID     This is your Care EveryWhere ID. This could be used by other organizations to access your [place] medical records  [id number]        Your Vitals Were     Pulse Temperature Respirations Pulse Oximetry BMI (Body Mass Index)       98 97.5  F (36.4  C) (Tympanic) 16 97% 27.04 kg/m2        Blood  Pressure from Last 3 Encounters:   09/07/18 125/86   08/28/18 119/79   08/20/18 125/80    Weight from Last 3 Encounters:   09/07/18 210 lb 9.6 oz (95.5 kg)   08/28/18 210 lb (95.3 kg)   08/20/18 205 lb 6.4 oz (93.2 kg)              Today, you had the following     No orders found for display         Today's Medication Changes          These changes are accurate as of 9/7/18  2:50 PM.  If you have any questions, ask your nurse or doctor.               Start taking these medicines.        Dose/Directions    amphetamine-dextroamphetamine 30 MG per 24 hr capsule   Commonly known as:  ADDERALL XR   Used for:  Attention deficit hyperactivity disorder (ADHD), unspecified ADHD type   Started by:  Blanche Barrera PA-C        Dose:  30 mg   Take 1 capsule (30 mg) by mouth daily   Quantity:  30 capsule   Refills:  0       clindamycin 1 % solution   Commonly known as:  CLEOCIN T   Used for:  Folliculitis   Started by:  Blanche Barrera PA-C        Apply topically 2 times daily   Quantity:  60 mL   Refills:  11            Where to get your medicines      These medications were sent to [id number] - [place], [place] - [address] [address]  [address] [place] [address]    Hours:  test fax successful 4/5/04 kr Phone:  [phone]     clindamycin 1 % solution         Some of these will need a paper prescription and others can be bought over the counter.  Ask your nurse if you have questions.     Bring a paper prescription for each of these medications     amphetamine-dextroamphetamine 30 MG per 24 hr capsule                Primary Care Provider Office Phone # Fax #    Blanche Barrera PA-C [phone]       [address] [place] [address]        Equal Access to Services     [facility] OLIVIA : Carl acunao Soschuyler, waaxda luqadaha, qaybta kaalmada adeegyada, waxay lacie mac. So [facility] [phone].    ATENCIÓN: Si donte morton forrester  disposición servicios gratuitos de asistencia lingüística. Lorenzo espinal [phone].    We comply with applicable federal civil rights laws and [place] laws. We do not discriminate on the basis of race, color, national origin, age, disability, sex, sexual orientation, or gender identity.            Thank you!     Thank you for choosing [facility]  for your care. Our goal is always to provide you with excellent care. Hearing back from our patients is one way we can continue to improve our services. Please take a few minutes to complete the written survey that you may receive in the mail after your visit with us. Thank you!             Your Updated Medication List - Protect others around you: Learn how to safely use, store and throw away your medicines at www.disposemymeds.org.          This list is accurate as of 9/7/18  2:50 PM.  Always use your most recent med list.                   Brand Name Dispense Instructions for use Diagnosis    amphetamine-dextroamphetamine 30 MG per 24 hr capsule    ADDERALL XR    30 capsule    Take 1 capsule (30 mg) by mouth daily    Attention deficit hyperactivity disorder (ADHD), unspecified ADHD type       azithromycin 250 MG tablet    ZITHROMAX    6 tablet    Two tablets first day, then one tablet daily for four days.    Acute sinusitis with symptoms > 10 days       clindamycin 1 % solution    CLEOCIN T    60 mL    Apply topically 2 times daily    Folliculitis       MELATONIN PO      Take by mouth At Bedtime 6 mg        methocarbamol 500 MG tablet    ROBAXIN    60 tablet    Take 1-2 tablets (500-1,000 mg) by mouth 3 times daily as needed for muscle spasms    Back muscle spasm       Multi-vitamin Tabs tablet      Take 1 tablet by mouth daily        omeprazole 20 MG CR capsule    priLOSEC    90 capsule    Take 1 capsule (20 mg) by mouth daily    Gastroesophageal reflux disease, esophagitis presence not specified       traMADol 50 MG tablet    ULTRAM    120 tablet    Take  1-2 tablets ( mg) by mouth every 6 hours as needed for moderate pain (max of 4 per day.) - This is a 1 month supply    SI (sacroiliac) joint dysfunction, Chronic pain syndrome, Chronic bilateral low back pain, with sciatica presence unspecified       varenicline 0.5 MG X 11 & 1 MG X 42 tablet    CHANTIX STARTING MONTH SUSAN    60 tablet    Take 0.5 mg tab daily for 3 days, then 0.5 mg tab twice daily for 4 days, then 1 mg twice daily.    Tobacco abuse       zolpidem 10 MG tablet    AMBIEN    30 tablet    Take 1 tablet (10 mg) by mouth nightly as needed for sleep Don't take within 4 hours of tramadol or with ALCOHOL    Other insomnia

## 2018-09-07 NOTE — PROGRESS NOTES
"  SUBJECTIVE:   Doug Galicia is a 42 year old male who presents to clinic today for the following health issues:      Abnormal Mood Symptoms/ADHD      Duration: [id number]    Description:  Depression: no   Anxiety: no   Panic attacks: no      Accompanying signs and symptoms: see PHQ-9 and ARIEL scores    History (similar episodes/previous evaluation): yes    Precipitating or alleviating factors: None    Therapies tried and outcome: none    Would like to go back on Adderall  Feels as though his focus lacking    Getting 6-7 hours of sleep per night, especially on work days  Per Dr. Javier on 18:  -If symptoms persist consider hypersomnia workup with actigraphy x2 weeks, Polysomnogram, MSLT fto follow if AHI<10 and urine drug screen. Ideally this would be done off of ambien, tramadol and robaxin.       PROBLEMS TO ADD ON...  Marks on both legs  Minimally itchy  Red bumps  Bleach bathes have not helped  -------------------------------------      Problem list and histories reviewed & adjusted, as indicated.  Additional history: as documented    Patient Active Problem List   Diagnosis     SI (sacroiliac) joint dysfunction     Chronic low back pain     Long term current use of opiate analgesic     Vitamin B12 deficiency without anemia     Insomnia, psychophysiological     Chronic pain syndrome     Tourette syndrome     ADHD (attention deficit hyperactivity disorder)     Gastroesophageal reflux disease, esophagitis presence not specified     Past Surgical History:   Procedure Laterality Date     ARTHROSCOPY KNEE RT/LT      right     C NONSPECIFIC PROCEDURE      Testicular Surgery       Social History   Substance Use Topics     Smoking status: Former Smoker     Types: Other     Smokeless tobacco: Current User     Types: Chew      Comment: \"years\" per pt. did not know date     Alcohol use No     Family History   Problem Relation Age of Onset     Colon Cancer Mother       in , diagnosed early 50s     Neurologic " Disorder Father      seizures     Diabetes No family hx of      Coronary Artery Disease No family hx of            Reviewed and updated as needed this visit by clinical staff  Tobacco  Allergies  Meds       Reviewed and updated as needed this visit by Provider         ROS:  Constitutional, skin, psych systems are negative, except as otherwise noted.    OBJECTIVE:                                                    /86  Pulse 98  Temp 97.5  F (36.4  C) (Tympanic)  Resp 16  Wt 210 lb 9.6 oz (95.5 kg)  SpO2 97%  BMI 27.04 kg/m2  Body mass index is 27.04 kg/(m^2).  GENERAL APPEARANCE: healthy, alert and no distress  MS: extremities normal- no gross deformities noted  SKIN: erythematous papules, some with central crusts, of lower legs  PSYCH: mentation appears normal and affect normal/bright       ASSESSMENT:                                                      1. Attention deficit hyperactivity disorder (ADHD), unspecified ADHD type    2. Folliculitis         PLAN:                                                    GIOVANI signed for [facility] records - will review these regarding his ADHD. May think about an official ADHD eval in the future pending his records and his response to Adderall XR. One prescription given for Adderall XR 30mg given to patient, #30.    Will trial topical clindamycin BID for the next month to his lower legs. Appears to be folliculitis.     The patient was in agreement with the plan today and had no questions or concerns prior to leaving the clinic.     Blanche Barrera PA-C  [facility]

## 2018-09-20 ENCOUNTER — MYC MEDICAL ADVICE (OUTPATIENT)
Dept: FAMILY MEDICINE | Facility: CLINIC | Age: 43
End: 2018-09-20

## 2018-09-20 DIAGNOSIS — M54.5 CHRONIC BILATERAL LOW BACK PAIN, WITH SCIATICA PRESENCE UNSPECIFIED: Primary | Chronic | ICD-10-CM

## 2018-09-20 DIAGNOSIS — G89.29 CHRONIC BILATERAL LOW BACK PAIN, WITH SCIATICA PRESENCE UNSPECIFIED: Primary | Chronic | ICD-10-CM

## 2018-09-21 ENCOUNTER — TELEPHONE (OUTPATIENT)
Dept: PALLIATIVE MEDICINE | Facility: CLINIC | Age: 43
End: 2018-09-21

## 2018-09-21 ENCOUNTER — MYC MEDICAL ADVICE (OUTPATIENT)
Dept: PALLIATIVE MEDICINE | Facility: CLINIC | Age: 43
End: 2018-09-21

## 2018-09-21 DIAGNOSIS — M53.3 SI (SACROILIAC) JOINT DYSFUNCTION: Primary | ICD-10-CM

## 2018-09-21 NOTE — TELEPHONE ENCOUNTER
Order pended. Routing for review    5/10/18 Procedure performed: bilateral SI joint injections  9/20/17 Procedure performed: bilateral SI joint injections    MATIAS Anderson, RN  Care Coordinator  [facility]

## 2018-09-21 NOTE — TELEPHONE ENCOUNTER
Left msg on vm for pt to schedule bilateral SI joint injections.      Jhonathan BURR    [facility] [place]

## 2018-09-21 NOTE — TELEPHONE ENCOUNTER
Done.  Call [phone] to schedule.    MD [facility]     Order signed.    MD [facility]

## 2018-09-24 NOTE — TELEPHONE ENCOUNTER
Faxed copy of O2 orders to [employer] [phone] and sent to be scanned into EPIC   Forms placed at  for . Patient informed. Copy sent to scanning

## 2018-09-27 ENCOUNTER — MYC MEDICAL ADVICE (OUTPATIENT)
Dept: PALLIATIVE MEDICINE | Facility: CLINIC | Age: 43
End: 2018-09-27

## 2018-09-28 ENCOUNTER — MYC MEDICAL ADVICE (OUTPATIENT)
Dept: PALLIATIVE MEDICINE | Facility: CLINIC | Age: 43
End: 2018-09-28

## 2018-09-28 NOTE — TELEPHONE ENCOUNTER
Pre-screening Questions for Radiology Injections:    Injection to be done at which interventional clinic site? [facility] [place]    Instruct patient to arrive as directed prior to the scheduled appointment time:    [place] AND [place]: 30 minutes before      Procedure ordered by RAYSA    Procedure ordered?  SI Joint Injection    What insurance would patient like us to bill for this procedure? PO      Worker's comp or MVA (motor vehicle accident) -Any injection DO NOT SCHEDULE and route to Kymberly Bruno.      [employer] - For SI joint injections, DO NOT SCHEDULE and route Tami Mccord. [employer] NO PA REQUIRED EFFECTIVE 11/1/2017      [employer]- MBB's must be scheduled at LEAST two weeks apart      [employer] - Any injection besides hip/shoulder/knee joint DO NOT SCHEDULE and route to Tami Mccord. She will obtain PA and call pt back to schedule procedure or notify pt of denial.       [employer]-Route to Tami for review    Any chance of pregnancy? Not Applicable   If YES, do NOT schedule and route to RN pool    Is an  needed? No     Patient has a drive home? (mandatory) YES:     Is patient taking any blood thinners (plavix, coumadin, jantoven, warfarin, heparin, pradaxa or dabigatran )? No   If hold needed, do NOT schedule, route to RN pool     Is patient taking any aspirin products (includes Excedrin and Fiorinal)? No     If more than 325mg/day do NOT schedule; route to RN pool     For CERVICAL procedures, hold all aspirin products for 6 days.     Tell pt that if aspirin product is not held for 6 days, the procedure WILL BE cancelled.      Does the patient have a bleeding or clotting disorder? No     If YES, okay to schedule AND route to RN nurse pool    For any patients with platelet count <100, must be forwarded to provider    Is patient diabetic?  No  If YES, have them bring their glucometer.    Does patient have an active infection or treated for one  within the past week? No     Is patient currently taking any antibiotics?  No     For patients on chronic, preventative, or prophylactic antibiotics, procedures may be scheduled.     For patients on antibiotics for active or recent infection:    Rosanna Quach Burton, [place]-antibiotic course must have been completed for 4 days    Is patient currently taking any steroid medications? (i.e. Prednisone, Medrol)  No     For patients on steroid medications:    Rosanna Quach Burton, [place]-steroid course must have been completed for 4 days    Reviewed with patient:  If you are started on any steroids or antibiotics between now and your appointment, you must contact us because the procedure may need to be cancelled.  Yes    Is patient actively being treated for cancer or immunocompromised? No  If YES, do NOT schedule and route to RN pool     Are you able to get on and off an exam table with minimal or no assistance? Yes  If NO, do NOT schedule and route to RN pool    Are you able to roll over and lay on your stomach with minimal or no assistance? Yes  If NO, do NOT schedule and route to RN pool     Any allergies to contrast dye, iodine, shellfish, or numbing and steroid medications? No  If YES, route to RN pool AND add allergy information to appointment notes    Allergies: Penicillins      Has the patient had a flu shot or any other vaccinations within 7 days before or after the procedure.  No     Does patient have an MRI/CT?  Not Applicable  (SI joint, hip injections, lumbar sympathetic blocks, and stellate ganglion blocks do not require an MRI)    Was the MRI done w/in the last 3 years?  NA    Was MRI done at [facility]? No      If not, where was it done? N/A       If MRI was not done at [facility], [facility] or [facility] do NOT schedule and route to nursing.  If pt has an imaging disc, the injection may be scheduled but pt has to bring disc to appt. If they show up w/out disc the injection cannot  be done    Reminders (please tell patient if applicable):       Instructed pt to arrive 30 minutes early for IV start if this is for a cervical procedure, ALL sympathetic (stellate ganglion, hypogastric, or lumbar sympathetic block) and all sedation procedures (RFA, spinal cord stimulation trials).  Not Applicable   -IVs are not routinely placed for Dr. Juares cervical cases   -Dr. Liao: IVs for cervical ESIs and cervical TBDs (not CMBBs/facet inj)      If NPO for sedation, informed patient that it is okay to take medications with sips of water (except if they are to hold blood thinners).  Not Applicable   *DO take blood pressure medication if it is prescribed*      If this is for a cervical JAIME, informed patient that aspirin needs to be held for 6 days.   Not Applicable      For all patients not having spinal cord stimulator (SCS) trials or radiofrequency ablations (RFAs), informed patient:    IV sedation is not provided for this procedure.  If you feel that an oral anti-anxiety medication is needed, you can discuss this further with your referring provider or primary care provider.  The Pain Clinic provider will discuss specifics of what the procedure includes at your appointment.  Most procedures last 10-20 minutes.  We use numbing medications to help with any discomfort during the procedure.  Not Applicable      Do not schedule procedures requiring IV placement in the first appointment of the day or first appointment after lunch. Do NOT schedule at 0745, 0815 or 1245.       For patients 85 or older we recommend having an adult stay w/ them for the remainder of the day.       Does the patient have any questions?    Tami Mccord  [facility]

## 2018-09-28 NOTE — TELEPHONE ENCOUNTER
[employer] message from patient on 9/27 at 1115:      I tried to call today for apt for SI shots, but my cells microphone are having issues. They couldn't hear 90% of what I said. Any chance of telling me what's open on a Monday or Tuesday as last as possible? Have a kid in school in [place]....     SHWETHA  ------  Reviewed chart. Patient is scheduled for the SI joing injection on Monday 10/1 at 1315.     [employer] message sent to pt:    Kaushik TADEO,     I see that you were able to get in with Dr. Marte for the injection on 10/1.  Please let us know if you have any other questions.     Take care,     ASHLEY LopezN, RN-BC  Patient Care Supervisor/Care Coordinator  [facility]

## 2018-10-01 ENCOUNTER — RADIOLOGY INJECTION OFFICE VISIT (OUTPATIENT)
Dept: PALLIATIVE MEDICINE | Facility: CLINIC | Age: 43
End: 2018-10-01
Payer: COMMERCIAL

## 2018-10-01 ENCOUNTER — RADIANT APPOINTMENT (OUTPATIENT)
Dept: RADIOLOGY | Facility: CLINIC | Age: 43
End: 2018-10-01
Attending: PSYCHIATRY & NEUROLOGY
Payer: COMMERCIAL

## 2018-10-01 VITALS
RESPIRATION RATE: 16 BRPM | SYSTOLIC BLOOD PRESSURE: 123 MMHG | DIASTOLIC BLOOD PRESSURE: 93 MMHG | OXYGEN SATURATION: 97 % | HEART RATE: 83 BPM

## 2018-10-01 DIAGNOSIS — M53.3 SI (SACROILIAC) JOINT DYSFUNCTION: ICD-10-CM

## 2018-10-01 DIAGNOSIS — M53.3 SI (SACROILIAC) JOINT DYSFUNCTION: Primary | ICD-10-CM

## 2018-10-01 PROCEDURE — 27096 INJECT SACROILIAC JOINT: CPT | Mod: 50 | Performed by: PSYCHIATRY & NEUROLOGY

## 2018-10-01 ASSESSMENT — PAIN SCALES - GENERAL: PAINLEVEL: SEVERE PAIN (6)

## 2018-10-01 NOTE — PROGRESS NOTES
Pre procedure Diagnosis: SI joint dysfunction    Post procedure Diagnosis: Same  Procedure performed: bilateral SI joint injections  Anesthesia: none  Complications: none  Operators: Radha Marte MD, Aristides Joya MD     Indications:   Doug Galicia is a 42 year old male seen by me for injections, sent by Dr. Johnston. They have a history of low back and buttock pain.  Exam shows bilateral SI joint tenderness and they have tried conservative treatment including medications, physical therapy, home-exercise, and previous injections.    Options/alternatives, benefits and risks were discussed with the patient including bleeding, infection, no pain relief, tissue trauma, exposure to radiation, reaction to medications including seizure, spinal cord injury, dural puncture, weakness, numbness and headache.  Questions were answered to his satisfaction and he agrees to proceed. Voluntary informed consent was obtained and signed.     Vitals were reviewed: Yes  Allergies were reviewed:  Yes   Medications were reviewed:  Yes   Pre-procedure pain score: 6/10    Procedure:  After getting informed consent, patient was brought into the procedure suite and was placed in a prone position on the procedure table.   A Pause for the Cause was performed.  Patient was prepped and draped in sterile fashion.     After identifying the bilateral SI joint, the C-arm was rotated to a obliquely to obtain the best view of the inferior angle of the joint.  A total of 3 ml of Lidocaine 1%  was used to anesthetize the skin at a skin entry site coaxial with the fluoroscopy beam at this location.  A 22gauge 3.5 inch needle was advanced under intermittent fluoroscopy until it was felt to enter the SI joint    A total of 3ml of Omnipaque-300 was injected, confirming appropriate position, with spread into the intraarticular space, with no intravascular uptake noted.  7ml was wasted. Location was verified in lateral view.    3 ml of 0.2% ropivacaine with  80mg of kenalog was injected, divided between the two sides.  The needle was flushed with lidocaine and removed.    During the procedure, there was not paresthesia.     Hemostasis was achieved, the area was cleaned, and bandaids were placed when appropriate.  The patient tolerated the procedure well, and was taken to the recovery room.    Images were saved to PACS.    Post-procedure pain score: 0/10  Follow-up includes:   -f/u phone call in one week  -f/u with Dr. Rosanna Marte MD  [facility]

## 2018-10-01 NOTE — PATIENT INSTRUCTIONS
[facility]   Procedure Discharge Instructions    Today you saw: Dr. Love Marte    You had an:   sacroiliac joint injection      Medications used:  Lidocaine  Omnipaque  Ropivicaine   Kenalog              Be cautious when walking. Numbness and/or weakness in the lower extremities may occur for up to 6-8 hours after the procedure due to effect of the local anesthetic    Do not drive for 6 hours. The effect of the local anesthetic could slow your reflexes.     You may resume your regular activities after 24 hours    Avoid strenuous activity for the first 24 hours    You may shower, however avoid swimming, tub baths or hot tubs for 24 hours following your procedure    You may have a mild to moderate increase in pain for several days following the injection.    It may take up to 14 days for the steroid medication to start working although you may feel the effect as early as a few days after the procedure.       You may use ice packs for 10-15 minutes, 3 to 4 times a day at the injection site for comfort    Do not use heat to painful areas for 6 to 8 hours. This will give the local anesthetic time to wear off and prevent you from accidentally burning your skin.     You may use anti-inflammatory medications (such as Ibuprofen or Aleve or Advil) or Tylenol for pain control if necessary    If you experience any of the following, call the Pain Clinic during work hours at [phone] or the Provider Line after hours at [phone]:  -Fever over 100 degree F  -Swelling, bleeding, redness, drainage, warmth at the injection site  -Progressive weakness or numbness in your legs   -Unusual new onset of pain that is not improving

## 2018-10-01 NOTE — NURSING NOTE
Discharge Information    IV Discontiued Time:  NA    Amount of Fluid Infused:  NA    Discharge Criteria = When patient returns to baseline or as per MD order    Consciousness:  Pt is fully awake    Circulation:  BP +/- 20% of pre-procedure level    Respiration:  Patient is able to breathe deeply    O2 Sat:  Patient is able to maintain O2 Sat >92% on room air    Activity:  Moves 4 extremities on command    Ambulation:  Patient is able to stand and walk or stand and pivot into wheelchair    Dressing:  Clean/dry or No Dressing    Notes:   Discharge instructions and AVS given to patient    Patient meets criteria for discharge?  YES    Admitted to PCU?  No    Responsible adult present to accompany patient home?  Yes    Signature/Title:    Jem Moe RN Care Coordinator  [facility]

## 2018-10-01 NOTE — MR AVS SNAPSHOT
After Visit Summary   10/1/2018    Doug Galicia    MRN: [id number]           Patient Information     Date Of Birth          1975        Visit Information        Provider Department      10/1/2018 1:15 PM Love Marte MD [facility]        Care Instructions    [facility]   Procedure Discharge Instructions    Today you saw: Dr. Love Marte    You had an:   sacroiliac joint injection      Medications used:  Lidocaine  Omnipaque  Ropivicaine   Kenalog              Be cautious when walking. Numbness and/or weakness in the lower extremities may occur for up to 6-8 hours after the procedure due to effect of the local anesthetic    Do not drive for 6 hours. The effect of the local anesthetic could slow your reflexes.     You may resume your regular activities after 24 hours    Avoid strenuous activity for the first 24 hours    You may shower, however avoid swimming, tub baths or hot tubs for 24 hours following your procedure    You may have a mild to moderate increase in pain for several days following the injection.    It may take up to 14 days for the steroid medication to start working although you may feel the effect as early as a few days after the procedure.       You may use ice packs for 10-15 minutes, 3 to 4 times a day at the injection site for comfort    Do not use heat to painful areas for 6 to 8 hours. This will give the local anesthetic time to wear off and prevent you from accidentally burning your skin.     You may use anti-inflammatory medications (such as Ibuprofen or Aleve or Advil) or Tylenol for pain control if necessary    If you experience any of the following, call the Pain Clinic during work hours at [phone] or the Provider Line after hours at [phone]:  -Fever over 100 degree F  -Swelling, bleeding, redness, drainage, warmth at the injection site  -Progressive weakness or numbness in your legs   -Unusual new onset of pain  that is not improving                Follow-ups after your visit        Your next 10 appointments already scheduled     Oct 08, 2018 10:40 AM CDT   MyChart Long with Blanche Barrera PA-C   [facility] [place] ([facility] [place])    [id number] [address] [facility]  [place] [phone] [phone]              Who to contact     If you have questions or need follow up information about today's clinic visit or your schedule please contact [facility] at [phone].  Normal or non-critical lab and imaging results will be communicated to you by [employer], letter or phone within 4 business days after the clinic has received the results. If you do not hear from us within 7 days, please contact the clinic through Zurnhart or phone. If you have a critical or abnormal lab result, we will notify you by phone as soon as possible.  Submit refill requests through [employer] or call your pharmacy and they will forward the refill request to us. Please allow 3 business days for your refill to be completed.          Additional Information About Your Visit        Zurnhart Information     [employer] gives you secure access to your electronic health record. If you see a primary care provider, you can also send messages to your care team and make appointments. If you have questions, please call your primary care clinic.  If you do not have a primary care provider, please call [phone] and they will assist you.        Care EveryWhere ID     This is your Care EveryWhere ID. This could be used by other organizations to access your [place] medical records  [id number]        Your Vitals Were     Pulse                   84            Blood Pressure from Last 3 Encounters:   10/01/18 (!) 132/91   09/07/18 125/86   08/28/18 119/79    Weight from Last 3 Encounters:   09/07/18 95.5 kg (210 lb 9.6 oz)   08/28/18 95.3 kg (210 lb)   08/20/18 93.2 kg (205 lb 6.4 oz)              Today, you had the following     No  orders found for display       Primary Care Provider Office Phone # Fax #    Blanche Barrera PA-C [phone]       [address] [place] [address]        Equal Access to Services     FROYLAN BAKER : Hadii aad ku hadluizao Soomaali, waaxda luqadaha, qaybta kaalmada adeegyada, waxelzbieta virgilioin jerin adelucia ovalles anders . So [facility] [phone].    ATENCIÓN: Si habla español, tiene a forrester disposición servicios gratuitos de asistencia lingüística. [facility] [phone].    We comply with applicable federal civil rights laws and [place] laws. We do not discriminate on the basis of race, color, national origin, age, disability, sex, sexual orientation, or gender identity.            Thank you!     Thank you for choosing [facility] ([facility]  for your care. Our goal is always to provide you with excellent care. Hearing back from our patients is one way we can continue to improve our services. Please take a few minutes to complete the written survey that you may receive in the mail after your visit with us. Thank you!             Your Updated Medication List - Protect others around you: Learn how to safely use, store and throw away your medicines at www.disposemymeds.org.          This list is accurate as of 10/1/18  1:33 PM.  Always use your most recent med list.                   Brand Name Dispense Instructions for use Diagnosis    amphetamine-dextroamphetamine 30 MG per 24 hr capsule    ADDERALL XR    30 capsule    Take 1 capsule (30 mg) by mouth daily    Attention deficit hyperactivity disorder (ADHD), unspecified ADHD type       azithromycin 250 MG tablet    ZITHROMAX    6 tablet    Two tablets first day, then one tablet daily for four days.    Acute sinusitis with symptoms > 10 days       clindamycin 1 % solution    CLEOCIN T    60 mL    Apply topically 2 times daily    Folliculitis       MELATONIN PO      Take by mouth At Bedtime 6 mg        methocarbamol 500 MG tablet    ROBAXIN    60 tablet     Take 1-2 tablets (500-1,000 mg) by mouth 3 times daily as needed for muscle spasms    Back muscle spasm       Multi-vitamin Tabs tablet      Take 1 tablet by mouth daily        omeprazole 20 MG CR capsule    priLOSEC    90 capsule    Take 1 capsule (20 mg) by mouth daily    Gastroesophageal reflux disease, esophagitis presence not specified       traMADol 50 MG tablet    ULTRAM    120 tablet    Take 1-2 tablets ( mg) by mouth every 6 hours as needed for moderate pain (max of 4 per day.) - This is a 1 month supply    SI (sacroiliac) joint dysfunction, Chronic pain syndrome, Chronic bilateral low back pain, with sciatica presence unspecified       varenicline 0.5 MG X 11 & 1 MG X 42 tablet    CHANTIX STARTING MONTH SUSAN    60 tablet    Take 0.5 mg tab daily for 3 days, then 0.5 mg tab twice daily for 4 days, then 1 mg twice daily.    Tobacco abuse       zolpidem 10 MG tablet    AMBIEN    30 tablet    Take 1 tablet (10 mg) by mouth nightly as needed for sleep Don't take within 4 hours of tramadol or with ALCOHOL    Other insomnia

## 2018-10-01 NOTE — NURSING NOTE
Pre-procedure Intake    Have you been fasting? NA    If yes, for how long? No     Are you taking a prescribed blood thinner such as coumadin, Plavix, Xarelto?    No    If yes, when did you take your last dose? No     Do you take aspirin?  No    If cervical procedure, have you held aspirin for 6 days?   NA    Do you have any allergies to contrast dye, iodine, steroid and/or numbing medications?  NO    Are you currently taking antibiotics or have an active infection?  NO    Have you had a fever/elevated temperature within the past week? NO    Are you currently taking oral steroids? NO    Do you have a ? Yes       Are you pregnant or breastfeeding?  Not Applicable    Are the vital signs normal?  No    Aditya Fernandes MA

## 2018-10-08 ENCOUNTER — OFFICE VISIT (OUTPATIENT)
Dept: FAMILY MEDICINE | Facility: CLINIC | Age: 43
End: 2018-10-08
Payer: COMMERCIAL

## 2018-10-08 VITALS
BODY MASS INDEX: 26.71 KG/M2 | SYSTOLIC BLOOD PRESSURE: 128 MMHG | DIASTOLIC BLOOD PRESSURE: 84 MMHG | OXYGEN SATURATION: 98 % | WEIGHT: 208 LBS | RESPIRATION RATE: 14 BRPM | TEMPERATURE: 97.9 F | HEART RATE: 99 BPM

## 2018-10-08 DIAGNOSIS — F90.9 ATTENTION DEFICIT HYPERACTIVITY DISORDER (ADHD), UNSPECIFIED ADHD TYPE: Primary | ICD-10-CM

## 2018-10-08 PROCEDURE — 99213 OFFICE O/P EST LOW 20 MIN: CPT | Performed by: PHYSICIAN ASSISTANT

## 2018-10-08 RX ORDER — DEXTROAMPHETAMINE SACCHARATE, AMPHETAMINE ASPARTATE MONOHYDRATE, DEXTROAMPHETAMINE SULFATE AND AMPHETAMINE SULFATE 7.5; 7.5; 7.5; 7.5 MG/1; MG/1; MG/1; MG/1
30 CAPSULE, EXTENDED RELEASE ORAL DAILY
Qty: 30 CAPSULE | Refills: 0 | Status: SHIPPED | OUTPATIENT
Start: 2018-10-08 | End: 2019-04-01

## 2018-10-08 RX ORDER — DEXTROAMPHETAMINE SACCHARATE, AMPHETAMINE ASPARTATE MONOHYDRATE, DEXTROAMPHETAMINE SULFATE AND AMPHETAMINE SULFATE 7.5; 7.5; 7.5; 7.5 MG/1; MG/1; MG/1; MG/1
30 CAPSULE, EXTENDED RELEASE ORAL DAILY
Qty: 30 CAPSULE | Refills: 0 | Status: SHIPPED | OUTPATIENT
Start: 2018-11-08 | End: 2019-04-01

## 2018-10-08 RX ORDER — DEXTROAMPHETAMINE SACCHARATE, AMPHETAMINE ASPARTATE MONOHYDRATE, DEXTROAMPHETAMINE SULFATE AND AMPHETAMINE SULFATE 7.5; 7.5; 7.5; 7.5 MG/1; MG/1; MG/1; MG/1
30 CAPSULE, EXTENDED RELEASE ORAL DAILY
Qty: 30 CAPSULE | Refills: 0 | Status: SHIPPED | OUTPATIENT
Start: 2018-12-09 | End: 2019-01-02

## 2018-10-08 NOTE — MR AVS SNAPSHOT
After Visit Summary   10/8/2018    Doug Galicia    MRN: [id number]           Patient Information     Date Of Birth          1975        Visit Information        Provider Department      10/8/2018 10:40 AM Blanche Barrera PA-C [facility] [place]        Today's Diagnoses     Attention deficit hyperactivity disorder (ADHD), unspecified ADHD type    -  1      Care Instructions    Call the clinic in 3 months for another set of three refills.   I'll see you back in the office in 6 months.            Follow-ups after your visit        Follow-up notes from your care team     Return in about 6 months (around 4/8/2019) for a med check.      Who to contact     Normal or non-critical lab and imaging results will be communicated to you by [employer], letter or phone within 4 business days after the clinic has received the results. If you do not hear from us within 7 days, please contact the clinic through [employer] or phone. If you have a critical or abnormal lab result, we will notify you by phone as soon as possible.  Submit refill requests through [employer] or call your pharmacy and they will forward the refill request to us. Please allow 3 business days for your refill to be completed.          If you need to speak with a  for additional information , please call: [phone]             Additional Information About Your Visit        [employer] Information     [employer] gives you secure access to your electronic health record. If you see a primary care provider, you can also send messages to your care team and make appointments. If you have questions, please call your primary care clinic.  If you do not have a primary care provider, please call [phone] and they will assist you.        Care EveryWhere ID     This is your Care EveryWhere ID. This could be used by other organizations to access your [place] medical records  [id number]        Your Vitals Were     Pulse Temperature  Respirations Pulse Oximetry BMI (Body Mass Index)       99 97.9  F (36.6  C) (Tympanic) 14 98% 26.71 kg/m2        Blood Pressure from Last 3 Encounters:   10/08/18 128/84   10/01/18 (!) 123/93   09/07/18 125/86    Weight from Last 3 Encounters:   10/08/18 208 lb (94.3 kg)   09/07/18 210 lb 9.6 oz (95.5 kg)   08/28/18 210 lb (95.3 kg)              Today, you had the following     No orders found for display         Today's Medication Changes          These changes are accurate as of 10/8/18 11:06 AM.  If you have any questions, ask your nurse or doctor.               These medicines have changed or have updated prescriptions.        Dose/Directions    * amphetamine-dextroamphetamine 30 MG per 24 hr capsule   Commonly known as:  ADDERALL XR   This may have changed:  Another medication with the same name was added. Make sure you understand how and when to take each.   Used for:  Attention deficit hyperactivity disorder (ADHD), unspecified ADHD type   Changed by:  Blanche Barrera PA-C        Dose:  30 mg   Take 1 capsule (30 mg) by mouth daily   Quantity:  30 capsule   Refills:  0       * amphetamine-dextroamphetamine 30 MG per 24 hr capsule   Commonly known as:  ADDERALL XR   This may have changed:  You were already taking a medication with the same name, and this prescription was added. Make sure you understand how and when to take each.   Used for:  Attention deficit hyperactivity disorder (ADHD), unspecified ADHD type   Changed by:  Blanche Barrera PA-C        Dose:  30 mg   Start taking on:  11/8/2018   Take 1 capsule (30 mg) by mouth daily   Quantity:  30 capsule   Refills:  0       * amphetamine-dextroamphetamine 30 MG per 24 hr capsule   Commonly known as:  ADDERALL XR   This may have changed:  You were already taking a medication with the same name, and this prescription was added. Make sure you understand how and when to take each.   Used for:  Attention deficit hyperactivity disorder (ADHD),  unspecified ADHD type   Changed by:  Blanche Barrera PA-C        Dose:  30 mg   Start taking on:  12/9/2018   Take 1 capsule (30 mg) by mouth daily   Quantity:  30 capsule   Refills:  0       * Notice:  This list has 3 medication(s) that are the same as other medications prescribed for you. Read the directions carefully, and ask your doctor or other care provider to review them with you.      Stop taking these medicines if you haven't already. Please contact your care team if you have questions.     azithromycin 250 MG tablet   Commonly known as:  ZITHROMAX   Stopped by:  Blanche Barrera PA-C           clindamycin 1 % solution   Commonly known as:  CLEOCIN T   Stopped by:  Blanche Barrera PA-C           varenicline 0.5 MG X 11 & 1 MG X 42 tablet   Commonly known as:  CHANTIX STARTING MONTH PAK   Stopped by:  Blanche Barrera PA-C                Where to get your medicines      Some of these will need a paper prescription and others can be bought over the counter.  Ask your nurse if you have questions.     Bring a paper prescription for each of these medications     amphetamine-dextroamphetamine 30 MG per 24 hr capsule    amphetamine-dextroamphetamine 30 MG per 24 hr capsule    amphetamine-dextroamphetamine 30 MG per 24 hr capsule                Primary Care Provider Office Phone # Fax #    Blanche Barrera PA-C [phone] [address] [facility] [address]        Equal Access to Services     [facility] AH: Hadii aad ku hadasho Soomaali, waaxda luqadaha, qaybta kaalmada adeegyada, waxay idiin hayaan yahir mcnamara . So [facility] [phone].    ATENCIÓN: Si habla español, tiene a forrester disposición servicios gratuitos de asistencia lingüística. Llame al [phone].    We comply with applicable federal civil rights laws and [place] laws. We do not discriminate on the basis of race, color, national origin, age, disability, sex, sexual orientation, or gender  identity.            Thank you!     Thank you for choosing [facility]  for your care. Our goal is always to provide you with excellent care. Hearing back from our patients is one way we can continue to improve our services. Please take a few minutes to complete the written survey that you may receive in the mail after your visit with us. Thank you!             Your Updated Medication List - Protect others around you: Learn how to safely use, store and throw away your medicines at www.disposemymeds.org.          This list is accurate as of 10/8/18 11:06 AM.  Always use your most recent med list.                   Brand Name Dispense Instructions for use Diagnosis    * amphetamine-dextroamphetamine 30 MG per 24 hr capsule    ADDERALL XR    30 capsule    Take 1 capsule (30 mg) by mouth daily    Attention deficit hyperactivity disorder (ADHD), unspecified ADHD type       * amphetamine-dextroamphetamine 30 MG per 24 hr capsule   Start taking on:  11/8/2018    ADDERALL XR    30 capsule    Take 1 capsule (30 mg) by mouth daily    Attention deficit hyperactivity disorder (ADHD), unspecified ADHD type       * amphetamine-dextroamphetamine 30 MG per 24 hr capsule   Start taking on:  12/9/2018    ADDERALL XR    30 capsule    Take 1 capsule (30 mg) by mouth daily    Attention deficit hyperactivity disorder (ADHD), unspecified ADHD type       MELATONIN PO      Take by mouth At Bedtime 6 mg        methocarbamol 500 MG tablet    ROBAXIN    60 tablet    Take 1-2 tablets (500-1,000 mg) by mouth 3 times daily as needed for muscle spasms    Back muscle spasm       Multi-vitamin Tabs tablet      Take 1 tablet by mouth daily        omeprazole 20 MG CR capsule    priLOSEC    90 capsule    Take 1 capsule (20 mg) by mouth daily    Gastroesophageal reflux disease, esophagitis presence not specified       traMADol 50 MG tablet    ULTRAM    120 tablet    Take 1-2 tablets ( mg) by mouth every 6 hours as needed for moderate  pain (max of 4 per day.) - This is a 1 month supply    SI (sacroiliac) joint dysfunction, Chronic pain syndrome, Chronic bilateral low back pain, with sciatica presence unspecified       zolpidem 10 MG tablet    AMBIEN    30 tablet    Take 1 tablet (10 mg) by mouth nightly as needed for sleep Don't take within 4 hours of tramadol or with ALCOHOL    Other insomnia       * Notice:  This list has 3 medication(s) that are the same as other medications prescribed for you. Read the directions carefully, and ask your doctor or other care provider to review them with you.

## 2018-10-08 NOTE — PROGRESS NOTES
"  SUBJECTIVE:   Doug Galicia is a 42 year old male who presents to clinic today for the following health issues:      Medication Followup of Adderall    Taking Medication as prescribed: yes    Side Effects:  None    Medication Helping Symptoms:  yes     Medication is helping tremendously with both work and home life  Improved sleep pattern as well         Problem list and histories reviewed & adjusted, as indicated.  Additional history: as documented    Patient Active Problem List   Diagnosis     SI (sacroiliac) joint dysfunction     Chronic low back pain     Long term current use of opiate analgesic     Vitamin B12 deficiency without anemia     Insomnia, psychophysiological     Chronic pain syndrome     Tourette syndrome     ADHD (attention deficit hyperactivity disorder)     Gastroesophageal reflux disease, esophagitis presence not specified     Past Surgical History:   Procedure Laterality Date     ARTHROSCOPY KNEE RT/LT      right     C NONSPECIFIC PROCEDURE      Testicular Surgery       Social History   Substance Use Topics     Smoking status: Former Smoker     Types: Other     Smokeless tobacco: Current User     Types: Chew      Comment: \"years\" per pt. did not know date     Alcohol use No     Family History   Problem Relation Age of Onset     Colon Cancer Mother       in , diagnosed early 50s     Neurologic Disorder Father      seizures     Diabetes No family hx of      Coronary Artery Disease No family hx of            Reviewed and updated as needed this visit by clinical staff  Tobacco  Allergies  Meds       Reviewed and updated as needed this visit by Provider         ROS:  Constitutional, and psychiatric systems are negative, except as otherwise noted.    OBJECTIVE:                                                    /84  Pulse 99  Temp 97.9  F (36.6  C) (Tympanic)  Resp 14  Wt 208 lb (94.3 kg)  SpO2 98%  BMI 26.71 kg/m2  Body mass index is 26.71 kg/(m^2).  GENERAL APPEARANCE: " healthy, alert and no distress  MS: extremities normal- no gross deformities noted  NEURO: Normal strength and tone  PSYCH: mentation appears normal and affect normal/bright       ASSESSMENT:                                                      1. Attention deficit hyperactivity disorder (ADHD), unspecified ADHD type         PLAN:                                                    Patient feels his Adderall XR is at a good dose. Three 1-month prescriptions for Adderall XR 30mg #30 were given to the patient, dated 28-30 days apart.    Patient Instructions   Call the clinic in 3 months for another set of three refills.   I'll see you back in the office in 6 months.       The patient was in agreement with the plan today and had no questions or concerns prior to leaving the clinic.     Blanche Barrera PA-C  [facility]

## 2018-10-08 NOTE — PATIENT INSTRUCTIONS
Call the clinic in 3 months for another set of three refills.   I'll see you back in the office in 6 months.

## 2018-11-03 ENCOUNTER — MYC MEDICAL ADVICE (OUTPATIENT)
Dept: FAMILY MEDICINE | Facility: CLINIC | Age: 43
End: 2018-11-03

## 2018-11-03 NOTE — LETTER
My Depression Action Plan  Name: Doug Galicia   Date of Birth 1975  Date: 11/8/2018    My doctor: Blanche Barrera   [address] clinic: [address] [place] [id number] [phone]          GREEN    ZONE   Good Control    What it looks like:     Things are going generally well. You have normal up s and down s. You may even feel depressed from time to time, but bad moods usually last less than a day.   What you need to do:  1. Continue to care for yourself (see self care plan)  2. Check your depression survival kit and update it as needed  3. Follow your physician s recommendations including any medication.  4. Do not stop taking medication unless you consult with your physician first.           YELLOW         ZONE Getting Worse    What it looks like:     Depression is starting to interfere with your life.     It may be hard to get out of bed; you may be starting to isolate yourself from others.    Symptoms of depression are starting to last most all day and this has happened for several days.     You may have suicidal thoughts but they are not constant.   What you need to do:     1. Call your care team, your response to treatment will improve if you keep your care team informed of your progress. Yellow periods are signs an adjustment may need to be made.     2. Continue your self-care, even if you have to fake it!    3. Talk to someone in your support network    4. Open up your depression survival kit           RED    ZONE Medical Alert - Get Help    What it looks like:     Depression is seriously interfering with your life.     You may experience these or other symptoms: You can t get out of bed most days, can t work or engage in other necessary activities, you have trouble taking care of basic hygiene, or basic responsibilities, thoughts of suicide or death that will not go away, self-injurious behavior.     What you need to do:  1. Call your care team and  request a same-day appointment. If they are not available (weekends or after hours) call your local crisis line, emergency room or 911.            Depression Self Care Plan / Survival Kit    Self-Care for Depression  Here s the deal. Your body and mind are really not as separate as most people think.  What you do and think affects how you feel and how you feel influences what you do and think. This means if you do things that people who feel good do, it will help you feel better.  Sometimes this is all it takes.  There is also a place for medication and therapy depending on how severe your depression is, so be sure to consult with your medical provider and/ or Behavioral Health Consultant if your symptoms are worsening or not improving.     In order to better manage my stress, I will:    Exercise  Get some form of exercise, every day. This will help reduce pain and release endorphins, the  feel good  chemicals in your brain. This is almost as good as taking antidepressants!  This is not the same as joining a gym and then never going! (they count on that by the way ) It can be as simple as just going for a walk or doing some gardening, anything that will get you moving.      Hygiene   Maintain good hygiene (Get out of bed in the morning, Make your bed, Brush your teeth, Take a shower, and Get dressed like you were going to work, even if you are unemployed).  If your clothes don't fit try to get ones that do.    Diet  I will strive to eat foods that are good for me, drink plenty of water, and avoid excessive sugar, caffeine, alcohol, and other mood-altering substances.  Some foods that are helpful in depression are: complex carbohydrates, B vitamins, flaxseed, fish or fish oil, fresh fruits and vegetables.    Psychotherapy  I agree to participate in Individual Therapy (if recommended).    Medication  If prescribed medications, I agree to take them.  Missing doses can result in serious side effects.  I understand that  drinking alcohol, or other illicit drug use, may cause potential side effects.  I will not stop my medication abruptly without first discussing it with my provider.    Staying Connected With Others  I will stay in touch with my friends, family members, and my primary care provider/team.    Use your imagination  Be creative.  We all have a creative side; it doesn t matter if it s oil painting, sand castles, or mud pies! This will also kick up the endorphins.    Witness Beauty  (AKA stop and smell the roses) Take a look outside, even in mid-winter. Notice colors, textures. Watch the squirrels and birds.     Service to others  Be of service to others.  There is always someone else in need.  By helping others we can  get out of ourselves  and remember the really important things.  This also provides opportunities for practicing all the other parts of the program.    Humor  Laugh and be silly!  Adjust your TV habits for less news and crime-drama and more comedy.    Control your stress  Try breathing deep, massage therapy, biofeedback, and meditation. Find time to relax each day.     My support system    Clinic Contact:  Phone number:    Contact 1:  Phone number:    Contact 2:  Phone number:    Congregational/:  Phone number:    Therapist:  Phone number:    Local crisis center:    Phone number:    Other community support:  Phone number:

## 2018-11-03 NOTE — LETTER
November 8, 2018      Doug Galicia  [address]  [place] [address]        Dear Abdias,            Errol is your depression action plan        Sincerely,        Blanchekuldeep Barrera PA-C/Linda Villegas, CMA

## 2018-11-25 ENCOUNTER — MYC REFILL (OUTPATIENT)
Dept: FAMILY MEDICINE | Facility: CLINIC | Age: 43
End: 2018-11-25

## 2018-11-25 DIAGNOSIS — G47.09 OTHER INSOMNIA: ICD-10-CM

## 2018-11-26 RX ORDER — ZOLPIDEM TARTRATE 10 MG/1
10 TABLET ORAL
Qty: 30 TABLET | Refills: 2 | Status: SHIPPED | OUTPATIENT
Start: 2018-11-26 | End: 2019-02-14

## 2018-11-26 NOTE — TELEPHONE ENCOUNTER
Message from [employer]:  Original authorizing provider: ROGELIO Maldonado AILEENCarlo Galicia would like a refill of the following medications:  zolpidem (AMBIEN) 10 MG tablet [Blanche Barrera PA-C]    Preferred pharmacy: [id number] - [place], [place] - [address] [address]    Comment:  Can this be filled on Friday or Saturday? Will be out after Sunday ([facility] closed), Monday and Tuesday I'm off and would rather not add the 20 mile drive. Let me know thanks Blanche!!

## 2018-11-30 ENCOUNTER — TELEPHONE (OUTPATIENT)
Dept: FAMILY MEDICINE | Facility: CLINIC | Age: 43
End: 2018-11-30

## 2018-11-30 NOTE — TELEPHONE ENCOUNTER
Are you ok with a 12/5 date on the Adderall script instead of 12/9?  Previous scripts were dated 10/8 and 11/8.

## 2018-11-30 NOTE — TELEPHONE ENCOUNTER
Attempted to call pharmacy. Unable to reach pharmacy because they do not open until 10 am.     Radha Pelayo, RN, BSN

## 2018-11-30 NOTE — TELEPHONE ENCOUNTER
Pharmacy needs clarification on start date of Adderall.that was written on 10/8 but is dated 12/9. There is usually a 3 day window. Can this be adjusted to 12/5?

## 2018-12-14 ENCOUNTER — MYC MEDICAL ADVICE (OUTPATIENT)
Dept: PALLIATIVE MEDICINE | Facility: CLINIC | Age: 43
End: 2018-12-14

## 2018-12-14 DIAGNOSIS — M53.3 SI (SACROILIAC) JOINT DYSFUNCTION: Primary | ICD-10-CM

## 2018-12-17 NOTE — TELEPHONE ENCOUNTER
Mychart message from patient on Friday 12/14 at 1255      Can we get SI shots ordered again. Seems earlier than normal but they are hurting at all times I'm awake.     TJ    -----------  Bilateral SI joint injections completed on 10/1.   Pt had SI joint injections on 5/10/18 and 9/20/17 as well.     Will route to Radha Marte MD for review. Order pended for provider review.      ASHLEY LopezN, RN-BC  Patient Care Supervisor/Care Coordinator  [facility] [place]

## 2018-12-18 ENCOUNTER — TELEPHONE (OUTPATIENT)
Dept: PALLIATIVE MEDICINE | Facility: CLINIC | Age: 43
End: 2018-12-18

## 2018-12-19 NOTE — TELEPHONE ENCOUNTER
Pre-screening Questions for Radiology Injections:    Injection to be done at which interventional clinic site? [facility] [place]    Instruct patient to arrive as directed prior to the scheduled appointment time:    [place] [facility] [place]: 30 minutes before      Procedure ordered by RAYSA    Procedure ordered?  SI Joint Injection    What insurance would patient like us to bill for this procedure? PO      Worker's comp or MVA (motor vehicle accident) -Any injection DO NOT SCHEDULE and route to Tami Mccord.      [email or web address] - For SI joint injections, DO NOT SCHEDULE and route Tami Mccord. [employer] FREEDOM NO PA REQUIRED EFFECTIVE 11/1/2017      [employer]- MBB's must be scheduled at LEAST two weeks apart      [employer] - Any injection besides hip/shoulder/knee joint DO NOT SCHEDULE and route to Tami Mccord. She will obtain PA and call pt back to schedule procedure or notify pt of denial.        [employer]-Route to Tami for review    Any chance of pregnancy? Not Applicable   If YES, do NOT schedule and route to RN pool    Is an  needed? No     Patient has a drive home? (mandatory) YES:     Is patient taking any blood thinners (plavix, coumadin, jantoven, warfarin, heparin, pradaxa or dabigatran )? No   If hold needed, do NOT schedule, route to RN pool     Is patient taking any aspirin products (includes Excedrin and Fiorinal)? No     If more than 325mg/day do NOT schedule; route to RN pool     For CERVICAL procedures, hold all aspirin products for 6 days.     Tell pt that if aspirin product is not held for 6 days, the procedure WILL BE cancelled.      Does the patient have a bleeding or clotting disorder? No     If YES, okay to schedule AND route to RN nurse pool    For any patients with platelet count <100, must be forwarded to provider    Is patient diabetic?  No  If YES, have them bring their glucometer.    Does patient have an active infection or treated for one  within the past week? No     Is patient currently taking any antibiotics?  No     For patients on chronic, preventative, or prophylactic antibiotics, procedures may be scheduled.     For patients on antibiotics for active or recent infection:    Rosanna Quach [place], [place]-antibiotic course must have been completed for 4 days    Is patient currently taking any steroid medications? (i.e. Prednisone, Medrol)  No     For patients on steroid medications:    Rosanna Quach Burton, [place]-steroid course must have been completed for 4 days    Reviewed with patient:  If you are started on any steroids or antibiotics between now and your appointment, you must contact us because the procedure may need to be cancelled.  Yes    Is patient actively being treated for cancer or immunocompromised? No  If YES, do NOT schedule and route to RN pool     Are you able to get on and off an exam table with minimal or no assistance? Yes  If NO, do NOT schedule and route to RN pool    Are you able to roll over and lay on your stomach with minimal or no assistance? Yes  If NO, do NOT schedule and route to RN pool     Any allergies to contrast dye, iodine, shellfish, or numbing and steroid medications? No  If YES, route to RN pool AND add allergy information to appointment notes    Allergies: Penicillins      Has the patient had a flu shot or any other vaccinations within 7 days before or after the procedure.  No     Does patient have an MRI/CT?  YES:   (SI joint, hip injections, lumbar sympathetic blocks, and stellate ganglion blocks do not require an MRI)    Was the MRI done w/in the last 3 years?  NA    Was MRI done at [place]? No      If not, where was it done? N/A       If MRI was not done at [place], [facility] or [facility] do NOT schedule and route to nursing.  If pt has an imaging disc, the injection may be scheduled but pt has to bring disc to appt. If they show up w/out disc the injection cannot be  done    Reminders (please tell patient if applicable):       Instructed pt to arrive 30 minutes early for IV start if this is for a cervical procedure, ALL sympathetic (stellate ganglion, hypogastric, or lumbar sympathetic block) and all sedation procedures (RFA, spinal cord stimulation trials).  Not Applicable   -IVs are not routinely placed for Dr. Juares cervical cases   -Dr. Liao: IVs for cervical ESIs and cervical TBDs (not CMBBs/facet inj)      If NPO for sedation, informed patient that it is okay to take medications with sips of water (except if they are to hold blood thinners).  Not Applicable   *DO take blood pressure medication if it is prescribed*      If this is for a cervical JAIME, informed patient that aspirin needs to be held for 6 days.   Not Applicable      For all patients not having spinal cord stimulator (SCS) trials or radiofrequency ablations (RFAs), informed patient:    IV sedation is not provided for this procedure.  If you feel that an oral anti-anxiety medication is needed, you can discuss this further with your referring provider or primary care provider.  The Pain Clinic provider will discuss specifics of what the procedure includes at your appointment.  Most procedures last 10-20 minutes.  We use numbing medications to help with any discomfort during the procedure.  Not Applicable      Do not schedule procedures requiring IV placement in the first appointment of the day or first appointment after lunch. Do NOT schedule at 0745, 0815 or 1245.       For patients 85 or older we recommend having an adult stay w/ them for the remainder of the day.       Does the patient have any questions?    Tami Mccord  [facility]

## 2018-12-24 ENCOUNTER — MYC REFILL (OUTPATIENT)
Dept: FAMILY MEDICINE | Facility: CLINIC | Age: 43
End: 2018-12-24

## 2018-12-24 DIAGNOSIS — F90.9 ATTENTION DEFICIT HYPERACTIVITY DISORDER (ADHD), UNSPECIFIED ADHD TYPE: ICD-10-CM

## 2018-12-24 RX ORDER — DEXTROAMPHETAMINE SACCHARATE, AMPHETAMINE ASPARTATE MONOHYDRATE, DEXTROAMPHETAMINE SULFATE AND AMPHETAMINE SULFATE 7.5; 7.5; 7.5; 7.5 MG/1; MG/1; MG/1; MG/1
30 CAPSULE, EXTENDED RELEASE ORAL DAILY
Qty: 30 CAPSULE | Refills: 0 | Status: SHIPPED | OUTPATIENT
Start: 2019-03-03 | End: 2019-01-02

## 2018-12-24 RX ORDER — DEXTROAMPHETAMINE SACCHARATE, AMPHETAMINE ASPARTATE MONOHYDRATE, DEXTROAMPHETAMINE SULFATE AND AMPHETAMINE SULFATE 7.5; 7.5; 7.5; 7.5 MG/1; MG/1; MG/1; MG/1
30 CAPSULE, EXTENDED RELEASE ORAL DAILY
Qty: 30 CAPSULE | Refills: 0 | Status: CANCELLED | OUTPATIENT
Start: 2018-12-24

## 2018-12-24 RX ORDER — DEXTROAMPHETAMINE SACCHARATE, AMPHETAMINE ASPARTATE MONOHYDRATE, DEXTROAMPHETAMINE SULFATE AND AMPHETAMINE SULFATE 7.5; 7.5; 7.5; 7.5 MG/1; MG/1; MG/1; MG/1
30 CAPSULE, EXTENDED RELEASE ORAL DAILY
Qty: 30 CAPSULE | Refills: 0 | Status: SHIPPED | OUTPATIENT
Start: 2019-02-03 | End: 2019-01-02

## 2018-12-24 RX ORDER — DEXTROAMPHETAMINE SACCHARATE, AMPHETAMINE ASPARTATE MONOHYDRATE, DEXTROAMPHETAMINE SULFATE AND AMPHETAMINE SULFATE 7.5; 7.5; 7.5; 7.5 MG/1; MG/1; MG/1; MG/1
30 CAPSULE, EXTENDED RELEASE ORAL DAILY
Qty: 30 CAPSULE | Refills: 0 | Status: SHIPPED | OUTPATIENT
Start: 2019-01-06 | End: 2019-01-02

## 2018-12-24 NOTE — TELEPHONE ENCOUNTER
Hard copy of script for Adderall XR 30 mg capsule dated 1/6/19, 2/3/19, 3/3/19 placed in locked file drawer at  for .

## 2019-01-02 ENCOUNTER — RADIOLOGY INJECTION OFFICE VISIT (OUTPATIENT)
Dept: PALLIATIVE MEDICINE | Facility: CLINIC | Age: 44
End: 2019-01-02
Payer: COMMERCIAL

## 2019-01-02 ENCOUNTER — ANCILLARY PROCEDURE (OUTPATIENT)
Dept: RADIOLOGY | Facility: CLINIC | Age: 44
End: 2019-01-02
Attending: PSYCHIATRY & NEUROLOGY
Payer: COMMERCIAL

## 2019-01-02 ENCOUNTER — MYC MEDICAL ADVICE (OUTPATIENT)
Dept: FAMILY MEDICINE | Facility: CLINIC | Age: 44
End: 2019-01-02

## 2019-01-02 ENCOUNTER — TELEPHONE (OUTPATIENT)
Dept: FAMILY MEDICINE | Facility: CLINIC | Age: 44
End: 2019-01-02

## 2019-01-02 VITALS
OXYGEN SATURATION: 96 % | RESPIRATION RATE: 16 BRPM | HEART RATE: 89 BPM | SYSTOLIC BLOOD PRESSURE: 117 MMHG | DIASTOLIC BLOOD PRESSURE: 86 MMHG

## 2019-01-02 DIAGNOSIS — M53.3 SI (SACROILIAC) JOINT DYSFUNCTION: Primary | ICD-10-CM

## 2019-01-02 DIAGNOSIS — M53.3 SI (SACROILIAC) JOINT DYSFUNCTION: ICD-10-CM

## 2019-01-02 DIAGNOSIS — F90.9 ATTENTION DEFICIT HYPERACTIVITY DISORDER (ADHD), UNSPECIFIED ADHD TYPE: Primary | ICD-10-CM

## 2019-01-02 PROCEDURE — 27096 INJECT SACROILIAC JOINT: CPT | Mod: 50 | Performed by: PSYCHIATRY & NEUROLOGY

## 2019-01-02 RX ORDER — DEXTROAMPHETAMINE SACCHARATE, AMPHETAMINE ASPARTATE MONOHYDRATE, DEXTROAMPHETAMINE SULFATE AND AMPHETAMINE SULFATE 7.5; 7.5; 7.5; 7.5 MG/1; MG/1; MG/1; MG/1
30 CAPSULE, EXTENDED RELEASE ORAL DAILY
Qty: 30 CAPSULE | Refills: 0 | Status: SHIPPED | OUTPATIENT
Start: 2019-03-05 | End: 2019-06-21

## 2019-01-02 RX ORDER — DEXTROAMPHETAMINE SACCHARATE, AMPHETAMINE ASPARTATE MONOHYDRATE, DEXTROAMPHETAMINE SULFATE AND AMPHETAMINE SULFATE 7.5; 7.5; 7.5; 7.5 MG/1; MG/1; MG/1; MG/1
30 CAPSULE, EXTENDED RELEASE ORAL DAILY
Qty: 30 CAPSULE | Refills: 0 | Status: SHIPPED | OUTPATIENT
Start: 2019-01-02 | End: 2019-04-01

## 2019-01-02 RX ORDER — DEXTROAMPHETAMINE SACCHARATE, AMPHETAMINE ASPARTATE MONOHYDRATE, DEXTROAMPHETAMINE SULFATE AND AMPHETAMINE SULFATE 7.5; 7.5; 7.5; 7.5 MG/1; MG/1; MG/1; MG/1
30 CAPSULE, EXTENDED RELEASE ORAL DAILY
Qty: 30 CAPSULE | Refills: 0 | Status: SHIPPED | OUTPATIENT
Start: 2019-02-02 | End: 2019-04-01

## 2019-01-02 ASSESSMENT — PAIN SCALES - GENERAL: PAINLEVEL: MODERATE PAIN (5)

## 2019-01-02 NOTE — NURSING NOTE
Discharge Information    IV Discontiued Time:  NA    Amount of Fluid Infused:  NA    Discharge Criteria = When patient returns to baseline or as per MD order    Consciousness:  Pt is fully awake    Circulation:  BP +/- 20% of pre-procedure level    Respiration:  Patient is able to breathe deeply    O2 Sat:  Patient is able to maintain O2 Sat >92% on room air    Activity:  Moves 4 extremities on command    Ambulation:  Patient is able to stand and walk or stand and pivot into wheelchair    Dressing:  Clean/dry or No Dressing    Notes:   Discharge instructions and AVS given to patient    Patient meets criteria for discharge?  YES    Admitted to PCU?  No    Responsible adult present to accompany patient home?  Yes    Signature/Title:    Jem Moe RN Care Coordinator  [facility]

## 2019-01-02 NOTE — TELEPHONE ENCOUNTER
See Telephone encounter from today.   Patient is asking to fill his Adderall prescription 3 days early.     Please advise.  Melania Antonio RN, BSN, PHN

## 2019-01-02 NOTE — PROGRESS NOTES
Pre procedure Diagnosis: SI joint dysfunction    Post procedure Diagnosis: Same  Procedure performed: bilateral SI joint injections  Anesthesia: none  Complications: none  Operators: Radha Marte MD    Indications:   Doug Galicia is a 43 year old male seen by me for injections, sent by Dr. Johnston. They have a history of low back and buttock pain.  Exam shows bilateral SI joint tenderness and they have tried conservative treatment including medications, physical therapy, home-exercise, and previous injections.    Options/alternatives, benefits and risks were discussed with the patient including bleeding, infection, no pain relief, tissue trauma, exposure to radiation, reaction to medications including seizure, spinal cord injury, dural puncture, weakness, numbness and headache.  Questions were answered to his satisfaction and he agrees to proceed. Voluntary informed consent was obtained and signed.     Vitals were reviewed: Yes  Allergies were reviewed:  Yes   Medications were reviewed:  Yes   Pre-procedure pain score: 5/10    Procedure:  After getting informed consent, patient was brought into the procedure suite and was placed in a prone position on the procedure table.   A Pause for the Cause was performed.  Patient was prepped and draped in sterile fashion.     After identifying the bilateral SI joint, the C-arm was rotated to a obliquely to obtain the best view of the inferior angle of the joint.  A total of 3 ml of Lidocaine 1%  was used to anesthetize the skin at a skin entry site coaxial with the fluoroscopy beam at this location.  A 22gauge 3.5 inch needle was advanced under intermittent fluoroscopy until it was felt to enter the SI joint    A total of 2ml of Omnipaque-300 was injected, confirming appropriate position, with spread into the intraarticular space, with no intravascular uptake noted.  8ml was wasted. Location was verified in lateral view.    3 ml of 0.2% ropivacaine with 80mg of kenalog was  injected, divided between the two sides.  The needle was flushed with lidocaine and removed.    During the procedure, there was not paresthesia.     Hemostasis was achieved, the area was cleaned, and bandaids were placed when appropriate.  The patient tolerated the procedure well, and was taken to the recovery room.    Images were saved to PACS.    Post-procedure pain score: 0/10  Follow-up includes:   -f/u phone call in one week  -f/u with Dr. Rosanna Marte MD  [facility]

## 2019-01-02 NOTE — PATIENT INSTRUCTIONS
[facility]   Procedure Discharge Instructions    Today you saw:     Dr. Love Marte      You had an:    sacroiliac joint injection      Medications used:  Lidocaine   Omnipaque  Ropivicaine   Kenalog             Be cautious when walking. Numbness and/or weakness in the lower extremities may occur for up to 6-8 hours after the procedure due to effect of the local anesthetic    Do not drive for 6 hours. The effect of the local anesthetic could slow your reflexes.     You may resume your regular activities after 24 hours    Avoid strenuous activity for the first 24 hours    You may shower, however avoid swimming, tub baths or hot tubs for 24 hours following your procedure    You may have a mild to moderate increase in pain for several days following the injection.    It may take up to 14 days for the steroid medication to start working although you may feel the effect as early as a few days after the procedure.       You may use ice packs for 10-15 minutes, 3 to 4 times a day at the injection site for comfort    Do not use heat to painful areas for 6 to 8 hours. This will give the local anesthetic time to wear off and prevent you from accidentally burning your skin.     You may use anti-inflammatory medications (such as Ibuprofen or Aleve or Advil) or Tylenol for pain control if necessary    If you experience any of the following, call the Pain Clinic during work hours at [phone] or the Provider Line after hours at [phone]:  -Fever over 100 degree F  -Swelling, bleeding, redness, drainage, warmth at the injection site  -Progressive weakness or numbness in your legs   -Unusual new onset of pain that is not improving

## 2019-01-02 NOTE — TELEPHONE ENCOUNTER
See patients MyChart message.   Message routed to PCP to advise.     Melania Antonio RN, BSN, PHN

## 2019-01-02 NOTE — TELEPHONE ENCOUNTER
Pt arrived at clinic- Kendra came back to POD 2 to collect scripts from Blanche's outbasket. 2  Hard copies from patient were shredded.

## 2019-01-02 NOTE — TELEPHONE ENCOUNTER
New prescriptions printed. Done, in my out basket.  Patient will  IN EXCHANGE for Feb and March prescriptions he has at home

## 2019-01-02 NOTE — NURSING NOTE
Pre-procedure Intake    Have you been fasting? NA    If yes, for how long? na    Are you taking a prescribed blood thinner such as coumadin, Plavix, Xarelto?    No    If yes, when did you take your last dose? na    Do you take aspirin?  No    If cervical procedure, have you held aspirin for 6 days?   NA    Do you have any allergies to contrast dye, iodine, steroid and/or numbing medications?  NO    Are you currently taking antibiotics or have an active infection?  NO    Have you had a fever/elevated temperature within the past week? NO    Are you currently taking oral steroids? NO    Do you have a ? Yes       Are you pregnant or breastfeeding?  Not Applicable    Are the vital signs normal?  Yes      Ryann Garber CMA ([facility])

## 2019-01-02 NOTE — TELEPHONE ENCOUNTER
Pharmacy is calling to speak with nurse or pcp patient is requesting to fill his medication adderall 3 days early     Please call pharmacy to discuss  Thank you

## 2019-01-02 NOTE — TELEPHONE ENCOUNTER
Called pharmacy. They do not open until 10 AM.    Will call back once pharmacy open.    Radha Pelayo, RN, BSN

## 2019-02-05 DIAGNOSIS — J01.90 ACUTE SINUSITIS WITH SYMPTOMS > 10 DAYS: ICD-10-CM

## 2019-02-05 NOTE — TELEPHONE ENCOUNTER
Requested Prescriptions   Pending Prescriptions Disp Refills     azithromycin (ZITHROMAX) 250 MG tablet [Pharmacy Med Name: Azithromycin Oral Tablet 250 MG]  Last Written Prescription Date:  08/20/18  Last Fill Quantity: 6,  # refills: 0   Last office visit: 10/8/2018 with prescribing provider:  JUSTIN Barrera   Future Office Visit:     6 tablet 0     Sig: TAKE 2 TABLETS BY MOUTH ON DAY 1, THEN 1 TABLET ONCE DAILY FOR DAYS 2-5    There is no refill protocol information for this order

## 2019-02-06 RX ORDER — AZITHROMYCIN 250 MG/1
TABLET, FILM COATED ORAL
Qty: 6 TABLET | Refills: 0 | OUTPATIENT
Start: 2019-02-06

## 2019-02-06 NOTE — TELEPHONE ENCOUNTER
Called pharmacy. They said patient had put all medications on auto refill, so this was an automated request, not actually requested by patient. Will close this encounter and refuse med.    Radha Pelayo, RN, BSN

## 2019-02-14 ENCOUNTER — MYC REFILL (OUTPATIENT)
Dept: FAMILY MEDICINE | Facility: CLINIC | Age: 44
End: 2019-02-14

## 2019-02-14 DIAGNOSIS — G47.09 OTHER INSOMNIA: ICD-10-CM

## 2019-02-14 NOTE — TELEPHONE ENCOUNTER
Routing refill request to provider for review/approval because:    Drug not on the [facility] refill protocol     Last OV with Blanche: 10/8/18 with advised 6 mo F/U for med check    Last filled: 11/26/18 for #30 with 2 refills    Radha Pelayo, RN, BSN

## 2019-02-14 NOTE — TELEPHONE ENCOUNTER
Routing refill request to provider for review/approval because:  Drug not on the [facility] refill protocol     Melania Antonio RN, BSN, PHN

## 2019-02-14 NOTE — TELEPHONE ENCOUNTER
Zolpidem Tartrate Oral Tablet 10 MG      Last Written Prescription Date:  01/21/19  Last Fill Quantity: 30,   # refills: 1  Last Office Visit: 10/08/18  JUSTIN Barrera  Future Office visit:       Routing refill request to provider for review/approval because:  Drug not on the [facility], [facility] or [facility] refill protocol or controlled substance

## 2019-02-15 RX ORDER — ZOLPIDEM TARTRATE 10 MG/1
10 TABLET ORAL
Qty: 30 TABLET | Refills: 2 | Status: SHIPPED | OUTPATIENT
Start: 2019-02-15 | End: 2019-05-06

## 2019-02-15 RX ORDER — ZOLPIDEM TARTRATE 10 MG/1
TABLET ORAL
Qty: 30 TABLET | Refills: 1 | OUTPATIENT
Start: 2019-02-15

## 2019-02-15 NOTE — TELEPHONE ENCOUNTER
Hard copy of script for Zolpidem 10 mg tablet faxed to [facility]

## 2019-04-01 ENCOUNTER — OFFICE VISIT (OUTPATIENT)
Dept: FAMILY MEDICINE | Facility: CLINIC | Age: 44
End: 2019-04-01
Payer: COMMERCIAL

## 2019-04-01 VITALS
BODY MASS INDEX: 26.34 KG/M2 | HEIGHT: 74 IN | DIASTOLIC BLOOD PRESSURE: 88 MMHG | RESPIRATION RATE: 14 BRPM | WEIGHT: 205.25 LBS | OXYGEN SATURATION: 98 % | TEMPERATURE: 98.8 F | SYSTOLIC BLOOD PRESSURE: 127 MMHG | HEART RATE: 102 BPM

## 2019-04-01 DIAGNOSIS — M54.5 CHRONIC BILATERAL LOW BACK PAIN, WITH SCIATICA PRESENCE UNSPECIFIED: Chronic | ICD-10-CM

## 2019-04-01 DIAGNOSIS — G89.4 CHRONIC PAIN SYNDROME: Chronic | ICD-10-CM

## 2019-04-01 DIAGNOSIS — G89.29 CHRONIC BILATERAL LOW BACK PAIN, WITH SCIATICA PRESENCE UNSPECIFIED: Chronic | ICD-10-CM

## 2019-04-01 DIAGNOSIS — M53.3 SI (SACROILIAC) JOINT DYSFUNCTION: ICD-10-CM

## 2019-04-01 DIAGNOSIS — F90.9 ATTENTION DEFICIT HYPERACTIVITY DISORDER (ADHD), UNSPECIFIED ADHD TYPE: Primary | ICD-10-CM

## 2019-04-01 DIAGNOSIS — Z23 NEED FOR TDAP VACCINATION: ICD-10-CM

## 2019-04-01 PROCEDURE — 90471 IMMUNIZATION ADMIN: CPT | Performed by: PHYSICIAN ASSISTANT

## 2019-04-01 PROCEDURE — 90715 TDAP VACCINE 7 YRS/> IM: CPT | Performed by: PHYSICIAN ASSISTANT

## 2019-04-01 PROCEDURE — 99214 OFFICE O/P EST MOD 30 MIN: CPT | Mod: 25 | Performed by: PHYSICIAN ASSISTANT

## 2019-04-01 RX ORDER — DEXTROAMPHETAMINE SACCHARATE, AMPHETAMINE ASPARTATE MONOHYDRATE, DEXTROAMPHETAMINE SULFATE AND AMPHETAMINE SULFATE 7.5; 7.5; 7.5; 7.5 MG/1; MG/1; MG/1; MG/1
30 CAPSULE, EXTENDED RELEASE ORAL DAILY
Qty: 30 CAPSULE | Refills: 0 | Status: SHIPPED | OUTPATIENT
Start: 2019-04-05 | End: 2019-06-21

## 2019-04-01 RX ORDER — DEXTROAMPHETAMINE SACCHARATE, AMPHETAMINE ASPARTATE MONOHYDRATE, DEXTROAMPHETAMINE SULFATE AND AMPHETAMINE SULFATE 7.5; 7.5; 7.5; 7.5 MG/1; MG/1; MG/1; MG/1
30 CAPSULE, EXTENDED RELEASE ORAL DAILY
Qty: 30 CAPSULE | Refills: 0 | Status: SHIPPED | OUTPATIENT
Start: 2019-06-05 | End: 2019-08-06

## 2019-04-01 RX ORDER — DEXTROAMPHETAMINE SACCHARATE, AMPHETAMINE ASPARTATE MONOHYDRATE, DEXTROAMPHETAMINE SULFATE AND AMPHETAMINE SULFATE 7.5; 7.5; 7.5; 7.5 MG/1; MG/1; MG/1; MG/1
30 CAPSULE, EXTENDED RELEASE ORAL DAILY
Qty: 30 CAPSULE | Refills: 0 | Status: SHIPPED | OUTPATIENT
Start: 2019-05-05 | End: 2019-06-21

## 2019-04-01 RX ORDER — TRAMADOL HYDROCHLORIDE 50 MG/1
50-100 TABLET ORAL EVERY 6 HOURS PRN
Qty: 120 TABLET | Refills: 5 | Status: SHIPPED | OUTPATIENT
Start: 2019-04-18 | End: 2019-08-12

## 2019-04-01 ASSESSMENT — ANXIETY QUESTIONNAIRES
6. BECOMING EASILY ANNOYED OR IRRITABLE: SEVERAL DAYS
IF YOU CHECKED OFF ANY PROBLEMS ON THIS QUESTIONNAIRE, HOW DIFFICULT HAVE THESE PROBLEMS MADE IT FOR YOU TO DO YOUR WORK, TAKE CARE OF THINGS AT HOME, OR GET ALONG WITH OTHER PEOPLE: NOT DIFFICULT AT ALL
1. FEELING NERVOUS, ANXIOUS, OR ON EDGE: NOT AT ALL
2. NOT BEING ABLE TO STOP OR CONTROL WORRYING: NOT AT ALL
5. BEING SO RESTLESS THAT IT IS HARD TO SIT STILL: NOT AT ALL
GAD7 TOTAL SCORE: 1
3. WORRYING TOO MUCH ABOUT DIFFERENT THINGS: NOT AT ALL
7. FEELING AFRAID AS IF SOMETHING AWFUL MIGHT HAPPEN: NOT AT ALL

## 2019-04-01 ASSESSMENT — PATIENT HEALTH QUESTIONNAIRE - PHQ9
5. POOR APPETITE OR OVEREATING: NOT AT ALL
SUM OF ALL RESPONSES TO PHQ QUESTIONS 1-9: 6

## 2019-04-01 ASSESSMENT — MIFFLIN-ST. JEOR: SCORE: 1905.76

## 2019-04-01 NOTE — PROGRESS NOTES
"  SUBJECTIVE:   Doug Galicia is a 43 year old male who presents to clinic today for the following health issues:      Medication Followup of Adderall     Taking Medication as prescribed: yes    Side Effects:  None    Medication Helping Symptoms:  Yes    No need to make a dose change       PROBLEMS TO ADD ON...  Refill of tramadol  Uses regularly - working well  No need to make a change with the prescription        Problem list and histories reviewed & adjusted, as indicated.  Additional history: as documented    Patient Active Problem List   Diagnosis     SI (sacroiliac) joint dysfunction     Chronic low back pain     Long term current use of opiate analgesic     Vitamin B12 deficiency without anemia     Insomnia, psychophysiological     Chronic pain syndrome     Tourette syndrome     ADHD (attention deficit hyperactivity disorder)     Gastroesophageal reflux disease, esophagitis presence not specified     Past Surgical History:   Procedure Laterality Date     ARTHROSCOPY KNEE RT/LT      right     C NONSPECIFIC PROCEDURE      Testicular Surgery       Social History     Tobacco Use     Smoking status: Former Smoker     Types: Other     Smokeless tobacco: Current User     Types: Chew     Tobacco comment: \"years\" per pt. did not know date   Substance Use Topics     Alcohol use: No     Alcohol/week: 0.0 oz     Family History   Problem Relation Age of Onset     Colon Cancer Mother          in , diagnosed early 50s     Neurologic Disorder Father         seizures     Diabetes No family hx of      Coronary Artery Disease No family hx of            Reviewed and updated as needed this visit by clinical staff  Tobacco  Allergies  Meds       Reviewed and updated as needed this visit by Provider         ROS:  Constitutional, musculoskeletal, and psychiatric systems are negative, except as otherwise noted.    OBJECTIVE:                                                    /88   Pulse 102   Temp 98.8  F (37.1 " " C) (Oral)   Resp 14   Ht 1.88 m (6' 2\")   Wt 93.1 kg (205 lb 4 oz)   SpO2 98%   BMI 26.35 kg/m    Body mass index is 26.35 kg/m .  Constitutional: healthy, alert, active, no distress.    Head: Normocephalic  Musculoskeletal: extremities normal- no gross deformities noted, gait normal, normal muscle tone and able to move about the exam room without difficulty.    Skin: no suspicious lesions or rashes appreciated on exposed areas  Neurologic: Gait normal. Moving all extremities spontaneously, no apparent weakness.    Psychiatric: mentation appears normal, thoughts are clear and concise. Converses appropriately. Affect is Appropriate/mood-congruent       ASSESSMENT:                                                      1. Attention deficit hyperactivity disorder (ADHD), unspecified ADHD type    2. SI (sacroiliac) joint dysfunction    3. Chronic pain syndrome    4. Chronic bilateral low back pain, with sciatica presence unspecified    5. Need for Tdap vaccination         PLAN:                                                    1) Three 1-month prescriptions for Adderall XR 30mg #30 were given to the patient, dated 30 days apart. He'll call in 3 months for another set of three refills. Follow up OV in 6 months. UDS up to date.     2-4) Tramadol renewed, no changes.     The patient was in agreement with the plan today and had no questions or concerns prior to leaving the clinic.      Blanche Barrera PA-C  [facility]    "

## 2019-04-02 ASSESSMENT — ANXIETY QUESTIONNAIRES: GAD7 TOTAL SCORE: 1

## 2019-05-06 ENCOUNTER — MYC REFILL (OUTPATIENT)
Dept: FAMILY MEDICINE | Facility: CLINIC | Age: 44
End: 2019-05-06

## 2019-05-06 DIAGNOSIS — G47.09 OTHER INSOMNIA: ICD-10-CM

## 2019-05-06 RX ORDER — ZOLPIDEM TARTRATE 10 MG/1
10 TABLET ORAL
Qty: 30 TABLET | Refills: 2 | Status: SHIPPED | OUTPATIENT
Start: 2019-05-15 | End: 2019-08-08

## 2019-05-06 NOTE — TELEPHONE ENCOUNTER
Routing refill request to provider for review/approval because:    Drug not on the [facility] refill protocol     Last OV with Blanche: 4/1/19 with advised 6 month F/U    Last filled: 2/15/19 for #30 with 2 refills    Radha Pelayo, RN, BSN

## 2019-05-08 ENCOUNTER — MYC MEDICAL ADVICE (OUTPATIENT)
Dept: FAMILY MEDICINE | Facility: CLINIC | Age: 44
End: 2019-05-08

## 2019-05-09 NOTE — TELEPHONE ENCOUNTER
Spoke with [facility] pharmacist at [facility]. Patient last filled Ambien Prescription 4/13/19. Pharmacy has a Rx with start date of 5/15/19.

## 2019-05-09 NOTE — TELEPHONE ENCOUNTER
Please call pharmacy to discuss and verify... When was the last date he filled Ambien?  May fill prescriptions 2-3 days early, but needs to start new prescription on start date

## 2019-05-10 NOTE — TELEPHONE ENCOUNTER
Spoke with Pharmacist( Norma) at [facility] informed ok to fill Zolpidem 5/12/19 or 5/13/19. They are not open on 5/12/19. So will refill 5/13/19

## 2019-05-10 NOTE — TELEPHONE ENCOUNTER
Patient is out of town next week per [employer] message... I will authorize an early fill on Saturday 5/11/19

## 2019-05-10 NOTE — TELEPHONE ENCOUNTER
Do they allow patients to fill 1-2 days prior for convenience? If not, I authorize an early fill, but patient to start medication on 5/15. May fill on 5/12 or 5/13

## 2019-06-11 ENCOUNTER — MYC MEDICAL ADVICE (OUTPATIENT)
Dept: PALLIATIVE MEDICINE | Facility: CLINIC | Age: 44
End: 2019-06-11

## 2019-06-11 DIAGNOSIS — M53.3 SI (SACROILIAC) JOINT DYSFUNCTION: ICD-10-CM

## 2019-06-11 DIAGNOSIS — M46.1 SI JOINT ARTHRITIS (H): Primary | ICD-10-CM

## 2019-06-11 NOTE — TELEPHONE ENCOUNTER
1/2/19 Procedure performed: bilateral SI joint injections. Order pended for review.    ASHLEY AndersonN, RN  Care Coordinator  [facility]

## 2019-06-12 ENCOUNTER — TELEPHONE (OUTPATIENT)
Dept: PALLIATIVE MEDICINE | Facility: CLINIC | Age: 44
End: 2019-06-12

## 2019-06-12 NOTE — TELEPHONE ENCOUNTER
SYDNIE for patient to schedule Bilateral SI Joint Injections        Holley Yun    [facility]

## 2019-06-13 NOTE — TELEPHONE ENCOUNTER
Pre-screening Questions for Radiology Injections:    Injection to be done at which interventional clinic site? [facility] [place]    Instruct patient to arrive as directed prior to the scheduled appointment time:    [place] minutes before      [place]: 30 minutes before; if IV needed 1 hour before     Procedure ordered by Dr. Marte    Procedure ordered? Bilateral SI Joint Injections        Transforaminal Cervical JAIME - Dr. Melly Holt ONLY    What insurance would patient like us to bill for this procedure? [employer]na      Worker's comp or MVA (motor vehicle accident) -Any injection DO NOT SCHEDULE and route to Tami Mccord.      [employer] insurance - For SI joint injections, DO NOT SCHEDULE and route Tami Mccord.       [employer] - Any injection besides hip/shoulder/knee joint DO NOT SCHEDULE and route to Tami Mccord. She will obtain PA and call pt back to schedule procedure or notify pt of denial.       [employer]-Route to Tami for review      IF SCHEDULING IN [place] AND NEEDS A PA, IT IS OKAY TO SCHEDULE. [place] HANDLES THEIR OWN PA'S AFTER THE PATIENT IS SCHEDULED. PLEASE SCHEDULE AT LEAST 1 WEEK OUT SO A PA CAN BE OBTAINED.      Any chance of pregnancy? Not Applicable   If YES, do NOT schedule and route to RN pool    Is an  needed? No     Patient has a drive home? (mandatory) YES: INFORMED    Is patient taking any blood thinners (plavix, coumadin, jantoven, warfarin, heparin, pradaxa or dabigatran )? No   If hold needed, do NOT schedule, route to RN pool     Is patient taking any aspirin products (includes Excedrin and Fiorinal)? No     If more than 325mg/day do NOT schedule; route to RN pool     For CERVICAL procedures, hold all aspirin products for 6 days.     Tell pt that if aspirin product is not held for 6 days, the procedure WILL BE cancelled.      Does the patient have a bleeding or clotting disorder? No     If YES, okay to schedule AND route to RN nurse pool    For  any patients with platelet count <100, must be forwarded to provider    Is patient diabetic?  No  If YES, have them bring their glucometer.    Does patient have an active infection or treated for one within the past week? No     Is patient currently taking any antibiotics?  No     For patients on chronic, preventative, or prophylactic antibiotics, procedures may be scheduled.     For patients on antibiotics for active or recent infection:antibiotic course must have been completed for 4 days    Is patient currently taking any steroid medications? (i.e. Prednisone, Medrol)  No     For patients on steroid medications, course must have been completed for 4 days    Reviewed with patient:  If you are started on any steroids or antibiotics between now and your appointment, you must contact us because the procedure may need to be cancelled.  Yes    Is patient actively being treated for cancer or immunocompromised? No  If YES, do NOT schedule and route to RN pool     Are you able to get on and off an exam table with minimal or no assistance? Yes  If NO, do NOT schedule and route to RN pool    Are you able to roll over and lay on your stomach with minimal or no assistance? Yes  If NO, do NOT schedule and route to RN pool     Any allergies to contrast dye, iodine, shellfish, or numbing and steroid medications? No  If YES, route to RN pool AND add allergy information to appointment notes    Allergies: Penicillins      Has the patient had a flu shot or any other vaccinations within 7 days before or after the procedure.  No     Does patient have an MRI/CT?  Not Applicable  (SI joint, hip injections, lumbar sympathetic blocks, and stellate ganglion blocks do not require an MRI)    Was the MRI done w/in the last 3 years?  NA    Was MRI done at [place]? No      If not, where was it done? N/A       If MRI was not done at [place], [facility] or [facility] do NOT schedule and route to nursing.  If pt has an imaging disc, the injection  may be scheduled but pt has to bring disc to appt. If they show up w/out disc the injection cannot be done    Reminders (please tell patient if applicable):       Instructed pt to arrive 30 minutes early for IV start if this is for a cervical procedure, ALL sympathetic (stellate ganglion, hypogastric, or lumbar sympathetic block) and all sedation procedures (RFA, spinal cord stimulation trials).  Not Applicable   -IVs are not routinely placed for Dr. Juares cervical cases   -Dr. Liao: IVs for cervical ESIs and cervical TBDs (not CMBBs/facet inj)      If NPO for sedation, informed patient that it is okay to take medications with sips of water (except if they are to hold blood thinners).  Not Applicable   *DO take blood pressure medication if it is prescribed*      If this is for a cervical JAIME, informed patient that aspirin needs to be held for 6 days.   Not Applicable      For all patients not having spinal cord stimulator (SCS) trials or radiofrequency ablations (RFAs), informed patient:    IV sedation is not provided for this procedure.  If you feel that an oral anti-anxiety medication is needed, you can discuss this further with your referring provider or primary care provider.  The Pain Clinic provider will discuss specifics of what the procedure includes at your appointment.  Most procedures last 10-20 minutes.  We use numbing medications to help with any discomfort during the procedure.  Not Applicable      Do not schedule procedures requiring IV placement in the first appointment of the day or first appointment after lunch. Do NOT schedule at 0745, 0815 or 1245. N/A      For patients 85 or older we recommend having an adult stay w/ them for the remainder of the day.   N/A    Does the patient have any questions?  NO  Holley Yun  [facility]

## 2019-06-15 ENCOUNTER — MYC REFILL (OUTPATIENT)
Dept: FAMILY MEDICINE | Facility: CLINIC | Age: 44
End: 2019-06-15

## 2019-06-15 DIAGNOSIS — F90.9 ATTENTION DEFICIT HYPERACTIVITY DISORDER (ADHD), UNSPECIFIED ADHD TYPE: ICD-10-CM

## 2019-06-15 RX ORDER — DEXTROAMPHETAMINE SACCHARATE, AMPHETAMINE ASPARTATE MONOHYDRATE, DEXTROAMPHETAMINE SULFATE AND AMPHETAMINE SULFATE 7.5; 7.5; 7.5; 7.5 MG/1; MG/1; MG/1; MG/1
30 CAPSULE, EXTENDED RELEASE ORAL DAILY
Qty: 30 CAPSULE | Refills: 0 | Status: CANCELLED | OUTPATIENT
Start: 2019-06-15

## 2019-06-17 RX ORDER — DEXTROAMPHETAMINE SACCHARATE, AMPHETAMINE ASPARTATE MONOHYDRATE, DEXTROAMPHETAMINE SULFATE AND AMPHETAMINE SULFATE 7.5; 7.5; 7.5; 7.5 MG/1; MG/1; MG/1; MG/1
30 CAPSULE, EXTENDED RELEASE ORAL DAILY
Qty: 30 CAPSULE | Refills: 0 | Status: SHIPPED | OUTPATIENT
Start: 2019-08-06 | End: 2019-08-26

## 2019-06-17 RX ORDER — DEXTROAMPHETAMINE SACCHARATE, AMPHETAMINE ASPARTATE MONOHYDRATE, DEXTROAMPHETAMINE SULFATE AND AMPHETAMINE SULFATE 7.5; 7.5; 7.5; 7.5 MG/1; MG/1; MG/1; MG/1
30 CAPSULE, EXTENDED RELEASE ORAL DAILY
Qty: 30 CAPSULE | Refills: 0 | Status: SHIPPED | OUTPATIENT
Start: 2019-07-06 | End: 2019-08-06

## 2019-06-17 RX ORDER — DEXTROAMPHETAMINE SACCHARATE, AMPHETAMINE ASPARTATE MONOHYDRATE, DEXTROAMPHETAMINE SULFATE AND AMPHETAMINE SULFATE 7.5; 7.5; 7.5; 7.5 MG/1; MG/1; MG/1; MG/1
30 CAPSULE, EXTENDED RELEASE ORAL DAILY
Qty: 30 CAPSULE | Refills: 0 | Status: SHIPPED | OUTPATIENT
Start: 2019-09-06 | End: 2019-08-06

## 2019-06-20 ENCOUNTER — RADIOLOGY INJECTION OFFICE VISIT (OUTPATIENT)
Dept: PALLIATIVE MEDICINE | Facility: CLINIC | Age: 44
End: 2019-06-20
Payer: COMMERCIAL

## 2019-06-20 ENCOUNTER — ANCILLARY PROCEDURE (OUTPATIENT)
Dept: RADIOLOGY | Facility: CLINIC | Age: 44
End: 2019-06-20
Attending: PSYCHIATRY & NEUROLOGY
Payer: COMMERCIAL

## 2019-06-20 VITALS
HEART RATE: 96 BPM | OXYGEN SATURATION: 98 % | RESPIRATION RATE: 16 BRPM | DIASTOLIC BLOOD PRESSURE: 85 MMHG | SYSTOLIC BLOOD PRESSURE: 130 MMHG

## 2019-06-20 DIAGNOSIS — M53.3 SACROILIAC JOINT DYSFUNCTION: ICD-10-CM

## 2019-06-20 DIAGNOSIS — M53.3 SI (SACROILIAC) JOINT DYSFUNCTION: Primary | ICD-10-CM

## 2019-06-20 PROCEDURE — 27096 INJECT SACROILIAC JOINT: CPT | Mod: 50 | Performed by: PSYCHIATRY & NEUROLOGY

## 2019-06-20 ASSESSMENT — PAIN SCALES - GENERAL: PAINLEVEL: MODERATE PAIN (4)

## 2019-06-20 NOTE — NURSING NOTE
Pre-procedure Intake    Have you been fasting? NA    If yes, for how long? No     Are you taking a prescribed blood thinner such as coumadin, Plavix, Xarelto?    No    If yes, when did you take your last dose? No     Do you take aspirin?  No    If cervical procedure, have you held aspirin for 6 days?   No     Do you have any allergies to contrast dye, iodine, steroid and/or numbing medications?  NO    Are you currently taking antibiotics or have an active infection?  NO    Have you had a fever/elevated temperature within the past week? NO    Are you currently taking oral steroids? NO    Do you have a ? Yes       Are you pregnant or breastfeeding?  NO    Are the vital signs normal?  Yes    Aditya Fernandes MA

## 2019-06-20 NOTE — PATIENT INSTRUCTIONS
[facility]   Procedure Discharge Instructions    Today you saw:  Dr. Love Marte      You had an:  sacroiliac joint injections    Medications used:  Lidocaine   Omnipaque  Ropivicaine   Kenalog              If you were holding your blood thinning medication, please restart taking it: N/A    Be cautious when walking. Numbness and/or weakness in the lower extremities may occur for up to 6-8 hours after the procedure due to effect of the local anesthetic    Do not drive for 6 hours. The effect of the local anesthetic could slow your reflexes.     You may resume your regular activities after 24 hours    Avoid strenuous activity for the first 24 hours    You may shower, however avoid swimming, tub baths or hot tubs for 24 hours following your procedure    You may have a mild to moderate increase in pain for several days following the injection.    It may take up to 14 days for the steroid medication to start working although you may feel the effect as early as a few days after the procedure.       You may use ice packs for 10-15 minutes, 3 to 4 times a day at the injection site for comfort    Do not use heat to painful areas for 6 to 8 hours. This will give the local anesthetic time to wear off and prevent you from accidentally burning your skin.     Unless you have been directed to avoid the use of anti-inflammatory medications (NSAIDS), you may use medications such as ibuprofen, Aleve or Tylenol for pain control if needed.     Possible side effects of steroids that you may experience include flushing, elevated blood pressure, increased appetite, mild headaches and restlessness.  All of these symptoms will get better with time.    If you experience any of the following, call the Pain Clinic during work hours at [phone] or the Provider Line after hours at [phone]:  -Fever over 100 degree F  -Swelling, bleeding, redness, drainage, warmth at the injection site  -Progressive weakness or  numbness in your legs  -Unusual new onset of pain that is not improving

## 2019-06-20 NOTE — PROGRESS NOTES
Pre procedure Diagnosis: SI joint dysfunction    Post procedure Diagnosis: Same  Procedure performed: bilateral SI joint injections  Anesthesia: none  Complications: none  Operators: Radha Marte MD    Indications:   Doug Galicia is a 43 year old male seen by me for injections, sent by Dr. Johnston. They have a history of low back and buttock pain.  Exam shows bilateral SI joint tenderness and they have tried conservative treatment including medications, physical therapy, home-exercise, and previous injections.    Options/alternatives, benefits and risks were discussed with the patient including bleeding, infection, no pain relief, tissue trauma, exposure to radiation, reaction to medications including seizure, spinal cord injury, dural puncture, weakness, numbness and headache.  Questions were answered to his satisfaction and he agrees to proceed. Voluntary informed consent was obtained and signed.     Vitals were reviewed: Yes  Allergies were reviewed:  Yes   Medications were reviewed:  Yes   Pre-procedure pain score: 4/10    Procedure:  After getting informed consent, patient was brought into the procedure suite and was placed in a prone position on the procedure table.   A Pause for the Cause was performed.  Patient was prepped and draped in sterile fashion.     After identifying the bilateral SI joint, the C-arm was rotated to a obliquely to obtain the best view of the inferior angle of the joint.  A total of 3 ml of Lidocaine 1%  was used to anesthetize the skin at a skin entry site coaxial with the fluoroscopy beam at this location.  A 22gauge 3.5 inch needle was advanced under intermittent fluoroscopy until it was felt to enter the SI joint    A total of 3ml of Omnipaque-300 was injected, confirming appropriate position, with spread into the intraarticular space, with no intravascular uptake noted.  7ml was wasted. Location was verified in lateral view.    3 ml of 0.2% ropivacaine with 80mg of kenalog was  injected, divided between the two sides.  The needle was flushed with lidocaine and removed.    During the procedure, there was not paresthesia.     Hemostasis was achieved, the area was cleaned, and bandaids were placed when appropriate.  The patient tolerated the procedure well, and was taken to the recovery room.    Images were saved to PACS.    Post-procedure pain score: 0/10  Follow-up includes:   -f/u phone call in one week  -f/u with Dr. Rosanna Marte MD  [facility]

## 2019-06-20 NOTE — NURSING NOTE
Discharge Information    IV Discontiued Time:  NA    Amount of Fluid Infused:  NA    Discharge Criteria = When patient returns to baseline or as per MD order    Consciousness:  Pt is fully awake    Circulation:  BP +/- 20% of pre-procedure level    Respiration:  Patient is able to breathe deeply    O2 Sat:  Patient is able to maintain O2 Sat >92% on room air    Activity:  Moves 4 extremities on command    Ambulation:  Patient is able to stand and walk or stand and pivot into wheelchair    Dressing:  Clean/dry or No Dressing    Notes:   Discharge instructions and AVS given to patient    Patient meets criteria for discharge?  YES    Admitted to PCU?  No    Responsible adult present to accompany patient home?  Yes    Signature/Title:    Jem Moe RN Care Coordinator  [facility]

## 2019-06-28 NOTE — MR AVS SNAPSHOT
After Visit Summary   9/20/2017    Doug Galicia    MRN: [id number]           Patient Information     Date Of Birth          1975        Visit Information        Provider Department      9/20/2017 9:15 AM [facility] MD [facility]        Care Instructions    [facility]   Procedure Discharge Instructions    Today you saw:  Dr. Love Marte    You had an:  bilateral sacro-iliac joint injection       Medications used:  Lidocaine     Omnipaque  Ropivicaine   Kenalog             Be cautious when walking. Numbness and/or weakness in the lower extremities may occur for up to 6-8 hours after the procedure due to effect of the local anesthetic    Do not drive for 6 hours. The effect of the local anesthetic could slow your reflexes.     You may resume your regular activities after 24 hours    Avoid strenuous activity for the first 24 hours    You may shower, however avoid swimming, tub baths or hot tubs for 24 hours following your procedure    You may have a mild to moderate increase in pain for several days following the injection.    It may take up to 14 days for the steroid medication to start working although you may feel the effect as early as a few days after the procedure.       You may use ice packs for 10-15 minutes, 3 to 4 times a day at the injection site for comfort    Do not use heat to painful areas for 6 to 8 hours. This will give the local anesthetic time to wear off and prevent you from accidentally burning your skin.     You may use anti-inflammatory medications (such as Ibuprofen or Aleve or Advil) or Tylenol for pain control if necessary    If you were fasting, you may resume your normal diet and medications after the procedure    If you have diabetes, check your blood sugar more frequently than usual as your blood sugar may be higher than normal for 10-14 days following a steroid injection. Contact your doctor who manages your diabetes  if your blood sugar is higher than usual    If you experience any of the following, call the pain center nursing line during work hours at [phone] or the after hours provider line at [phone]:  -Fever over 100 degree F  -Swelling, bleeding, redness, drainage, warmth at the injection site  -Progressive weakness or numbness in your legs or arms  -Loss of bowel or bladder function  -Unusual headache that is not relieved by Tylenol  -Unusual new onset of pain that is not improving      Phone #s:  Appointment line: [phone];  Nurse line: [phone]              Follow-ups after your visit        Who to contact     If you have questions or need follow up information about today's clinic visit or your schedule please contact [facility] directly at [phone].  Normal or non-critical lab and imaging results will be communicated to you by [employer], letter or phone within 4 business days after the clinic has received the results. If you do not hear from us within 7 days, please contact the clinic through [employer] or phone. If you have a critical or abnormal lab result, we will notify you by phone as soon as possible.  Submit refill requests through [employer] or call your pharmacy and they will forward the refill request to us. Please allow 3 business days for your refill to be completed.          Additional Information About Your Visit        Visible[employer]staâ„¢ Information     [employer] gives you secure access to your electronic health record. If you see a primary care provider, you can also send messages to your care team and make appointments. If you have questions, please call your primary care clinic.  If you do not have a primary care provider, please call [phone] and they will assist you.        Care EveryWhere ID     This is your Care EveryWhere ID. This could be used by other organizations to access your [facility] medical records  [id number]        Your Vitals Were     Pulse                   85             Blood Pressure from Last 3 Encounters:   09/20/17 118/82   06/22/17 135/85   02/23/17 136/86    Weight from Last 3 Encounters:   06/22/17 104.3 kg (230 lb)   02/23/17 105.7 kg (233 lb)   10/10/16 103.9 kg (229 lb)              Today, you had the following     No orders found for display       Primary Care Provider Office Phone # Fax #    Ryan Johnston [phone] [address] [facility] [address]        Equal Access to Services     [facility]: Hadii aad ku hadasho Soomaali, waaxda luqadaha, qaybta kaalmada adekarthikeyan, kaya mcnamara . So [facility] [phone].    ATENCIÓN: Si habla español, tiene a forrester disposición servicios gratuitos de asistencia lingüística. [facility] [phone].    We comply with applicable federal civil rights laws and [place] laws. We do not discriminate on the basis of race, color, national origin, age, disability sex, sexual orientation or gender identity.            Thank you!     Thank you for choosing [facility]  for your care. Our goal is always to provide you with excellent care. Hearing back from our patients is one way we can continue to improve our services. Please take a few minutes to complete the written survey that you may receive in the mail after your visit with us. Thank you!             Your Updated Medication List - Protect others around you: Learn how to safely use, store and throw away your medicines at www.disposemymeds.org.          This list is accurate as of: 9/20/17  9:28 AM.  Always use your most recent med list.                   Brand Name Dispense Instructions for use Diagnosis    CORTISOL PO      1 tablet tid        DIPHENHYDRAMINE HCL PO      Take 50 mg by mouth nightly as needed 2-4 tablets        DULoxetine 20 MG EC capsule    CYMBALTA    360 capsule    Take 2 capsules (40 mg) by mouth 2 times daily    Chronic low back pain with sciatica, sciatica laterality unspecified, unspecified  back pain laterality, Anxiety       MELATONIN PO      Take by mouth At Bedtime 10 mg        methocarbamol 500 MG tablet    ROBAXIN    60 tablet    Take 1-2 tablets (500-1,000 mg) by mouth 3 times daily as needed for muscle spasms    Back muscle spasm       Multi-vitamin Tabs tablet      Take 1 tablet by mouth daily        OMEPRAZOLE PO      Take 20 mg by mouth every morning        traMADol 50 MG tablet    ULTRAM    120 tablet    Take 1-2 tablets ( mg) by mouth every 6 hours as needed for moderate pain (max of 4 per day.) 30 day supply    SI (sacroiliac) joint dysfunction       * UNABLE TO FIND      MEDICATION NAME: alpha Jym /testosterone natural 3 tablets twice daily        * UNABLE TO FIND      MEDICATION NAME: Shred JYM-fat burner 6 caplets daily        zolpidem 10 MG tablet    AMBIEN    30 tablet    Take 1 tablet (10 mg) by mouth nightly as needed Pharmacy ok to hold prescription until due    Other insomnia       * Notice:  This list has 2 medication(s) that are the same as other medications prescribed for you. Read the directions carefully, and ask your doctor or other care provider to review them with you.       105

## 2019-07-11 ENCOUNTER — MYC MEDICAL ADVICE (OUTPATIENT)
Dept: FAMILY MEDICINE | Facility: CLINIC | Age: 44
End: 2019-07-11

## 2019-08-05 ENCOUNTER — MYC MEDICAL ADVICE (OUTPATIENT)
Dept: FAMILY MEDICINE | Facility: CLINIC | Age: 44
End: 2019-08-05

## 2019-08-05 DIAGNOSIS — F90.9 ATTENTION DEFICIT HYPERACTIVITY DISORDER (ADHD), UNSPECIFIED ADHD TYPE: Primary | ICD-10-CM

## 2019-08-05 NOTE — TELEPHONE ENCOUNTER
Breana, just an SANGI this message was never routed to me. Just make sure you're routing before doneing

## 2019-08-06 ENCOUNTER — TELEPHONE (OUTPATIENT)
Dept: FAMILY MEDICINE | Facility: CLINIC | Age: 44
End: 2019-08-06

## 2019-08-06 RX ORDER — DEXTROAMPHETAMINE SACCHARATE, AMPHETAMINE ASPARTATE MONOHYDRATE, DEXTROAMPHETAMINE SULFATE AND AMPHETAMINE SULFATE 2.5; 2.5; 2.5; 2.5 MG/1; MG/1; MG/1; MG/1
10 CAPSULE, EXTENDED RELEASE ORAL DAILY
Qty: 30 CAPSULE | Refills: 0 | Status: SHIPPED | OUTPATIENT
Start: 2019-08-06 | End: 2019-08-23

## 2019-08-06 NOTE — TELEPHONE ENCOUNTER
Spoke with pharmacist at [facility] and confirmed below providers message to discontinue Adderall 30 for September.     No further questions at this time.     Melania Antonio, RN, BSN, PHN

## 2019-08-06 NOTE — TELEPHONE ENCOUNTER
Yes, please see med history. Adderall XR 30 prescription dated 9/6/19 was discontinued and should be cancelled at the pharmacy. There's only one prescription dated for September

## 2019-08-06 NOTE — TELEPHONE ENCOUNTER
Please advise.  Did you still need any of the adderall RX's cancelled?      Blanche Barrera PA-C   to Abdias Galicia            7:02 AM   Well, since you already filled the 30's - I'll send a prescription for 10mg tabs.   Blanche

## 2019-08-06 NOTE — TELEPHONE ENCOUNTER
Norma states that she would like more information on the Adderrall that you wanted her to cancel as far as the strength and wich one to cancel as she only has one dated 9-3-19.  Please call.    Thank you.

## 2019-08-07 ENCOUNTER — MYC MEDICAL ADVICE (OUTPATIENT)
Dept: FAMILY MEDICINE | Facility: CLINIC | Age: 44
End: 2019-08-07

## 2019-08-07 ENCOUNTER — MYC REFILL (OUTPATIENT)
Dept: FAMILY MEDICINE | Facility: CLINIC | Age: 44
End: 2019-08-07

## 2019-08-07 DIAGNOSIS — G89.29 CHRONIC BILATERAL LOW BACK PAIN, WITH SCIATICA PRESENCE UNSPECIFIED: Chronic | ICD-10-CM

## 2019-08-07 DIAGNOSIS — M53.3 SI (SACROILIAC) JOINT DYSFUNCTION: ICD-10-CM

## 2019-08-07 DIAGNOSIS — G47.09 OTHER INSOMNIA: ICD-10-CM

## 2019-08-07 DIAGNOSIS — M54.5 CHRONIC BILATERAL LOW BACK PAIN, WITH SCIATICA PRESENCE UNSPECIFIED: Chronic | ICD-10-CM

## 2019-08-07 DIAGNOSIS — G89.4 CHRONIC PAIN SYNDROME: Chronic | ICD-10-CM

## 2019-08-08 RX ORDER — ZOLPIDEM TARTRATE 10 MG/1
10 TABLET ORAL
Qty: 30 TABLET | Refills: 2 | Status: SHIPPED | OUTPATIENT
Start: 2019-08-15 | End: 2019-10-18

## 2019-08-11 RX ORDER — TRAMADOL HYDROCHLORIDE 50 MG/1
50-100 TABLET ORAL EVERY 6 HOURS PRN
Qty: 120 TABLET | Refills: 5 | OUTPATIENT
Start: 2019-08-11

## 2019-08-12 ENCOUNTER — MYC MEDICAL ADVICE (OUTPATIENT)
Dept: FAMILY MEDICINE | Facility: CLINIC | Age: 44
End: 2019-08-12

## 2019-08-12 DIAGNOSIS — G89.29 CHRONIC BILATERAL LOW BACK PAIN, WITH SCIATICA PRESENCE UNSPECIFIED: Chronic | ICD-10-CM

## 2019-08-12 DIAGNOSIS — M53.3 SI (SACROILIAC) JOINT DYSFUNCTION: ICD-10-CM

## 2019-08-12 DIAGNOSIS — M54.5 CHRONIC BILATERAL LOW BACK PAIN, WITH SCIATICA PRESENCE UNSPECIFIED: Chronic | ICD-10-CM

## 2019-08-12 DIAGNOSIS — G89.4 CHRONIC PAIN SYNDROME: Chronic | ICD-10-CM

## 2019-08-12 RX ORDER — TRAMADOL HYDROCHLORIDE 50 MG/1
50-100 TABLET ORAL EVERY 6 HOURS PRN
Qty: 120 TABLET | Refills: 1 | Status: SHIPPED | OUTPATIENT
Start: 2019-08-12 | End: 2019-09-20

## 2019-08-12 NOTE — TELEPHONE ENCOUNTER
Routing refill request to provider for review/approval because:  Drug not on the [facility] refill protocol   Writer not authorized to check .  Liliya De Dios RN

## 2019-08-12 NOTE — TELEPHONE ENCOUNTER
Requested Prescriptions   Pending Prescriptions Disp Refills     traMADol (ULTRAM) 50 MG tablet [Pharmacy Med Name: traMADol HCl Oral Tablet 50 MG] 120 tablet 0     Sig: TAKE 1-2 TABLETS BY MOUTH EVERY 6 HOURS IF NEEDED FOR MODERATE PAIN. MAX 4 TABLETS PER DAY   Last Written Prescription Date:  3-18-19  Last Fill Quantity: 120,  # refills: 0   Last office visit: 4/1/2019 with prescribing provider:  4-1-19   Future Office Visit:   Next 5 appointments (look out 90 days)    Sep 17, 2019 10:20 AM CDT  Trevor Kelly with Blanche Barrera PA-C  [facility] ([facility]) [id number] [facility] [id number] [phone]           There is no refill protocol information for this order

## 2019-08-13 RX ORDER — TRAMADOL HYDROCHLORIDE 50 MG/1
TABLET ORAL
Qty: 120 TABLET | Refills: 0 | OUTPATIENT
Start: 2019-08-13

## 2019-08-23 ENCOUNTER — MYC MEDICAL ADVICE (OUTPATIENT)
Dept: FAMILY MEDICINE | Facility: CLINIC | Age: 44
End: 2019-08-23

## 2019-09-17 ENCOUNTER — OFFICE VISIT (OUTPATIENT)
Dept: FAMILY MEDICINE | Facility: CLINIC | Age: 44
End: 2019-09-17
Payer: COMMERCIAL

## 2019-09-17 VITALS
BODY MASS INDEX: 25.86 KG/M2 | HEART RATE: 101 BPM | WEIGHT: 201.4 LBS | SYSTOLIC BLOOD PRESSURE: 132 MMHG | OXYGEN SATURATION: 98 % | TEMPERATURE: 97.9 F | DIASTOLIC BLOOD PRESSURE: 86 MMHG | RESPIRATION RATE: 18 BRPM

## 2019-09-17 DIAGNOSIS — M54.5 CHRONIC BILATERAL LOW BACK PAIN, WITH SCIATICA PRESENCE UNSPECIFIED: Chronic | ICD-10-CM

## 2019-09-17 DIAGNOSIS — Z23 INFLUENZA VACCINE NEEDED: ICD-10-CM

## 2019-09-17 DIAGNOSIS — G89.4 CHRONIC PAIN SYNDROME: Chronic | ICD-10-CM

## 2019-09-17 DIAGNOSIS — G89.29 CHRONIC BILATERAL LOW BACK PAIN, WITH SCIATICA PRESENCE UNSPECIFIED: Chronic | ICD-10-CM

## 2019-09-17 DIAGNOSIS — Z02.89 ENCOUNTER FOR COMPLETION OF FORM WITH PATIENT: ICD-10-CM

## 2019-09-17 DIAGNOSIS — F90.9 ATTENTION DEFICIT HYPERACTIVITY DISORDER (ADHD), UNSPECIFIED ADHD TYPE: Primary | ICD-10-CM

## 2019-09-17 LAB
AMPHETAMINES UR QL: ABNORMAL NG/ML
BARBITURATES UR QL SCN: NOT DETECTED NG/ML
BENZODIAZ UR QL SCN: NOT DETECTED NG/ML
BUPRENORPHINE UR QL: NOT DETECTED NG/ML
CANNABINOIDS UR QL: NOT DETECTED NG/ML
COCAINE UR QL SCN: NOT DETECTED NG/ML
D-METHAMPHET UR QL: NOT DETECTED NG/ML
METHADONE UR QL SCN: NOT DETECTED NG/ML
OPIATES UR QL SCN: NOT DETECTED NG/ML
OXYCODONE UR QL SCN: NOT DETECTED NG/ML
PCP UR QL SCN: NOT DETECTED NG/ML
PROPOXYPH UR QL: NOT DETECTED NG/ML
TRICYCLICS UR QL SCN: NOT DETECTED NG/ML

## 2019-09-17 PROCEDURE — 80306 DRUG TEST PRSMV INSTRMNT: CPT | Performed by: PHYSICIAN ASSISTANT

## 2019-09-17 PROCEDURE — 90686 IIV4 VACC NO PRSV 0.5 ML IM: CPT | Performed by: PHYSICIAN ASSISTANT

## 2019-09-17 PROCEDURE — 99214 OFFICE O/P EST MOD 30 MIN: CPT | Mod: 25 | Performed by: PHYSICIAN ASSISTANT

## 2019-09-17 PROCEDURE — 90471 IMMUNIZATION ADMIN: CPT | Performed by: PHYSICIAN ASSISTANT

## 2019-09-17 RX ORDER — DEXTROAMPHETAMINE SACCHARATE, AMPHETAMINE ASPARTATE MONOHYDRATE, DEXTROAMPHETAMINE SULFATE AND AMPHETAMINE SULFATE 2.5; 2.5; 2.5; 2.5 MG/1; MG/1; MG/1; MG/1
10 CAPSULE, EXTENDED RELEASE ORAL DAILY
Qty: 30 CAPSULE | Refills: 0 | Status: SHIPPED | OUTPATIENT
Start: 2019-10-03 | End: 2020-01-14

## 2019-09-17 RX ORDER — DEXTROAMPHETAMINE SACCHARATE, AMPHETAMINE ASPARTATE MONOHYDRATE, DEXTROAMPHETAMINE SULFATE AND AMPHETAMINE SULFATE 7.5; 7.5; 7.5; 7.5 MG/1; MG/1; MG/1; MG/1
30 CAPSULE, EXTENDED RELEASE ORAL DAILY
Qty: 30 CAPSULE | Refills: 0 | Status: SHIPPED | OUTPATIENT
Start: 2019-11-02 | End: 2019-11-24

## 2019-09-17 RX ORDER — DEXTROAMPHETAMINE SACCHARATE, AMPHETAMINE ASPARTATE MONOHYDRATE, DEXTROAMPHETAMINE SULFATE AND AMPHETAMINE SULFATE 2.5; 2.5; 2.5; 2.5 MG/1; MG/1; MG/1; MG/1
10 CAPSULE, EXTENDED RELEASE ORAL DAILY
Qty: 30 CAPSULE | Refills: 0 | Status: SHIPPED | OUTPATIENT
Start: 2019-11-02 | End: 2019-11-22

## 2019-09-17 RX ORDER — DEXTROAMPHETAMINE SACCHARATE, AMPHETAMINE ASPARTATE MONOHYDRATE, DEXTROAMPHETAMINE SULFATE AND AMPHETAMINE SULFATE 7.5; 7.5; 7.5; 7.5 MG/1; MG/1; MG/1; MG/1
30 CAPSULE, EXTENDED RELEASE ORAL DAILY
Qty: 30 CAPSULE | Refills: 0 | Status: SHIPPED | OUTPATIENT
Start: 2019-10-03 | End: 2020-01-14

## 2019-09-17 NOTE — PROGRESS NOTES
Subjective     Doug Galicia is a 43 year old male who presents to clinic today for the following health issues:    HPI   Chronic/Recurring Back Pain Follow Up      Where is your back pain located? (Select all that apply) low back bilateral and middle of back bilateral    How would you describe your back pain?  sharp, stabbing and pressure    Where does your back pain spread? the right and left buttock    Since your last clinic visit for back pain, how has your pain changed? unchanged    Does your back pain interfere with your job? YES    Since your last visit, have you tried any new treatment? No        How many servings of fruits and vegetables do you eat daily?  4 or more    On average, how many sweetened beverages do you drink each day (soda, juice, sweet tea, etc)?   1    How many days per week do you miss taking your medication? 0    Patient brings in a calendar of his missed work  This does not differentiate absences due to back pain versus other reasons  Has missed 18 days of work in a 12 month period  Has been suspended 3x this year due to attendance related to his back pain  In the last 3 months his back has caused him to miss work once weekly   Feels as though his pain tolerance has decreased   Had an injection in June with Nixdorf      Medication Followup of Adderall XR 10 & 30mg    Taking Medication as prescribed: yes    Side Effects:  None    Medication Helping Symptoms:  yes       PROBLEMS TO ADD ON...  FMLA-forms     -------------------------------------    Patient Active Problem List   Diagnosis     SI (sacroiliac) joint dysfunction     Chronic low back pain     Long term current use of opiate analgesic     Vitamin B12 deficiency without anemia     Insomnia, psychophysiological     Chronic pain syndrome     Tourette syndrome     ADHD (attention deficit hyperactivity disorder)     Gastroesophageal reflux disease, esophagitis presence not specified     Past Surgical History:   Procedure Laterality  "Date     ARTHROSCOPY KNEE RT/LT      right     C NONSPECIFIC PROCEDURE      Testicular Surgery       Social History     Tobacco Use     Smoking status: Former Smoker     Types: Other     Smokeless tobacco: Current User     Types: Chew     Tobacco comment: \"years\" per pt. did not know date   Substance Use Topics     Alcohol use: No     Alcohol/week: 0.0 oz     Family History   Problem Relation Age of Onset     Colon Cancer Mother          in , diagnosed early 50s     Neurologic Disorder Father         seizures     Diabetes No family hx of      Coronary Artery Disease No family hx of            Reviewed and updated as needed this visit by Provider         Review of Systems   ROS COMP: Constitutional, musculoskeletal, and psych systems are negative, except as otherwise noted.      Objective    /86   Pulse 101   Temp 97.9  F (36.6  C) (Tympanic)   Resp 18   Wt 91.4 kg (201 lb 6.4 oz)   SpO2 98%   BMI 25.86 kg/m    Body mass index is 25.86 kg/m .  Physical Exam   Constitutional: healthy, alert, active, no distress.    Head: Normocephalic  Musculoskeletal: extremities normal- no gross deformities noted, gait normal, normal muscle tone and able to move about the exam room without difficulty.    Skin: no suspicious lesions or rashes appreciated on exposed areas  Neurologic: Gait normal. Moving all extremities spontaneously, no apparent weakness.    Psychiatric: mentation appears normal, thoughts are clear and concise. Converses appropriately. Affect is Appropriate/mood-congruent          Assessment & Plan   Assessment  1. Attention deficit hyperactivity disorder (ADHD), unspecified ADHD type    2. Chronic pain syndrome    3. Chronic bilateral low back pain, with sciatica presence unspecified    4. Encounter for completion of form with patient    5. Influenza vaccine needed         Plan  1) New prescriptions sent to patient's pharmacy - no changes. UDS today.    2-4) [facility] paperwork renewed - sent to " "scanning. Patient will continue seeing pain management for injections as needed. Tramadol prescription with refills.      BMI:   Estimated body mass index is 25.86 kg/m  as calculated from the following:    Height as of 4/1/19: 1.88 m (6' 2\").    Weight as of this encounter: 91.4 kg (201 lb 6.4 oz).     Return in about 6 months (around 3/17/2020) for ADHD and pain follow up.    Blanche Barrera PA-C  [facility]          "

## 2019-09-20 ENCOUNTER — MYC REFILL (OUTPATIENT)
Dept: FAMILY MEDICINE | Facility: CLINIC | Age: 44
End: 2019-09-20

## 2019-09-20 ENCOUNTER — MYC MEDICAL ADVICE (OUTPATIENT)
Dept: FAMILY MEDICINE | Facility: CLINIC | Age: 44
End: 2019-09-20

## 2019-09-20 DIAGNOSIS — G89.4 CHRONIC PAIN SYNDROME: Chronic | ICD-10-CM

## 2019-09-20 DIAGNOSIS — M53.3 SI (SACROILIAC) JOINT DYSFUNCTION: ICD-10-CM

## 2019-09-20 DIAGNOSIS — M54.5 CHRONIC BILATERAL LOW BACK PAIN, WITH SCIATICA PRESENCE UNSPECIFIED: Chronic | ICD-10-CM

## 2019-09-20 DIAGNOSIS — G89.29 CHRONIC BILATERAL LOW BACK PAIN, WITH SCIATICA PRESENCE UNSPECIFIED: Chronic | ICD-10-CM

## 2019-09-20 DIAGNOSIS — K21.9 GASTROESOPHAGEAL REFLUX DISEASE, ESOPHAGITIS PRESENCE NOT SPECIFIED: ICD-10-CM

## 2019-09-24 RX ORDER — TRAMADOL HYDROCHLORIDE 50 MG/1
50-100 TABLET ORAL EVERY 6 HOURS PRN
Qty: 120 TABLET | Refills: 0 | Status: SHIPPED | OUTPATIENT
Start: 2019-09-24 | End: 2019-12-13

## 2019-09-24 NOTE — TELEPHONE ENCOUNTER
Prescription approved per [facility] Refill Protocol.    Per , last 3 refills:    traMADol HCl Oral Tablet 50 MG      1.  Written: 8-12-19  Filled: 8-12-19  Quantity: 28  Provider: ANGELES    Per script in Epic, #120 was to be dispensed with 1 refill.     - last script with 1 refill was written over 30 days ago, with new law new script needed. (per pharmacy)   - insurance required a smaller supply then #120 to be given out last time, patient was only given #28 (per  & pharmacy confirmed)   - per pharmacy should be able to give full refill now #120    Medication pended for #120    Melania Li, RN, BSN, PHN

## 2019-10-31 ENCOUNTER — TELEPHONE (OUTPATIENT)
Dept: FAMILY MEDICINE | Facility: CLINIC | Age: 44
End: 2019-10-31

## 2019-11-01 NOTE — TELEPHONE ENCOUNTER
I called yesterday and gave permission for an early fill - yes, to both meds. Will you please call the pharmacy again? He is leaving town this morning

## 2019-11-25 ENCOUNTER — MYC MEDICAL ADVICE (OUTPATIENT)
Dept: FAMILY MEDICINE | Facility: CLINIC | Age: 44
End: 2019-11-25

## 2019-11-25 NOTE — TELEPHONE ENCOUNTER
Spoke with Pharmacist at [facility], there is a rx for Adderall 10 mg and Rx for 30 mg. Both Rx's can be picked up 12/2/19. Patient is not to start the med until 12/4/19. Pharmacist made a note on his file

## 2019-11-25 NOTE — TELEPHONE ENCOUNTER
Per the pharmacy:  Start date = fill date  The date he takes his first dose is written in the directions

## 2019-12-13 DIAGNOSIS — M53.3 SI (SACROILIAC) JOINT DYSFUNCTION: ICD-10-CM

## 2019-12-13 DIAGNOSIS — G89.29 CHRONIC BILATERAL LOW BACK PAIN, UNSPECIFIED WHETHER SCIATICA PRESENT: ICD-10-CM

## 2019-12-13 DIAGNOSIS — M54.50 CHRONIC BILATERAL LOW BACK PAIN, UNSPECIFIED WHETHER SCIATICA PRESENT: ICD-10-CM

## 2019-12-13 DIAGNOSIS — G89.4 CHRONIC PAIN SYNDROME: Chronic | ICD-10-CM

## 2019-12-13 RX ORDER — TRAMADOL HYDROCHLORIDE 50 MG/1
50-100 TABLET ORAL EVERY 6 HOURS PRN
Qty: 120 TABLET | Refills: 0 | Status: SHIPPED | OUTPATIENT
Start: 2019-12-13 | End: 2020-02-03

## 2019-12-13 NOTE — TELEPHONE ENCOUNTER
web-site not loading.     Routing refill request to provider for review/approval because:  Drug not on the [facility] refill protocol     Per [facility] traMADol (ULTRAM) 50 MG tablet last script written on 9/24/19 for #120 by PCP.    Please advise on refill.    Melania Li, RN, BSN, PHN

## 2019-12-13 NOTE — TELEPHONE ENCOUNTER
Requested Prescriptions   Pending Prescriptions Disp Refills     traMADol (ULTRAM) 50 MG tablet 120 tablet 0     Sig: Take 1-2 tablets ( mg) by mouth every 6 hours as needed for moderate pain (max of 4 per day.) - This should last at least 1 month supply  tramadol      Last Written Prescription Date:  9/25/19  Last Fill Quantity: 120,   # refills: 0  Last Office Visit: 9/17//19 KNAEBLE  Future Office visit:       Routing refill request to provider for review/approval because:  Drug not on the [facility], [facility] or [facility] refill protocol or controlled substance       There is no refill protocol information for this order

## 2020-01-21 ENCOUNTER — MYC MEDICAL ADVICE (OUTPATIENT)
Dept: PALLIATIVE MEDICINE | Facility: CLINIC | Age: 45
End: 2020-01-21

## 2020-01-21 ENCOUNTER — TELEPHONE (OUTPATIENT)
Dept: PALLIATIVE MEDICINE | Facility: CLINIC | Age: 45
End: 2020-01-21

## 2020-01-21 DIAGNOSIS — M53.3 SI (SACROILIAC) JOINT DYSFUNCTION: Primary | ICD-10-CM

## 2020-01-21 NOTE — TELEPHONE ENCOUNTER
Spoke to pt to schedule bilateral SI joint . He will call back to schedule.      Jhonathan BURR    [facility] [place]

## 2020-01-21 NOTE — TELEPHONE ENCOUNTER
6/20/20 Procedure performed: bilateral SI joint injections. Order prepped for review.    ASHLEY AndersonN, RN  Care Coordinator  [facility] [place]

## 2020-02-02 ENCOUNTER — MYC REFILL (OUTPATIENT)
Dept: FAMILY MEDICINE | Facility: CLINIC | Age: 45
End: 2020-02-02

## 2020-02-02 DIAGNOSIS — G89.29 CHRONIC BILATERAL LOW BACK PAIN, UNSPECIFIED WHETHER SCIATICA PRESENT: ICD-10-CM

## 2020-02-02 DIAGNOSIS — M53.3 SI (SACROILIAC) JOINT DYSFUNCTION: ICD-10-CM

## 2020-02-02 DIAGNOSIS — G89.4 CHRONIC PAIN SYNDROME: Chronic | ICD-10-CM

## 2020-02-02 DIAGNOSIS — M54.50 CHRONIC BILATERAL LOW BACK PAIN, UNSPECIFIED WHETHER SCIATICA PRESENT: ICD-10-CM

## 2020-02-02 RX ORDER — TRAMADOL HYDROCHLORIDE 50 MG/1
50-100 TABLET ORAL EVERY 6 HOURS PRN
Qty: 120 TABLET | Refills: 0 | Status: CANCELLED | OUTPATIENT
Start: 2020-02-02

## 2020-02-03 ENCOUNTER — OFFICE VISIT (OUTPATIENT)
Dept: FAMILY MEDICINE | Facility: CLINIC | Age: 45
End: 2020-02-03
Payer: COMMERCIAL

## 2020-02-03 VITALS
HEART RATE: 95 BPM | OXYGEN SATURATION: 95 % | WEIGHT: 221.8 LBS | BODY MASS INDEX: 28.48 KG/M2 | SYSTOLIC BLOOD PRESSURE: 120 MMHG | DIASTOLIC BLOOD PRESSURE: 84 MMHG | TEMPERATURE: 97.8 F | RESPIRATION RATE: 16 BRPM

## 2020-02-03 DIAGNOSIS — G89.4 CHRONIC PAIN SYNDROME: Chronic | ICD-10-CM

## 2020-02-03 DIAGNOSIS — M54.50 CHRONIC BILATERAL LOW BACK PAIN, UNSPECIFIED WHETHER SCIATICA PRESENT: ICD-10-CM

## 2020-02-03 DIAGNOSIS — M53.3 SI (SACROILIAC) JOINT DYSFUNCTION: ICD-10-CM

## 2020-02-03 DIAGNOSIS — G89.29 CHRONIC BILATERAL LOW BACK PAIN, UNSPECIFIED WHETHER SCIATICA PRESENT: ICD-10-CM

## 2020-02-03 DIAGNOSIS — F90.9 ATTENTION DEFICIT HYPERACTIVITY DISORDER (ADHD), UNSPECIFIED ADHD TYPE: Primary | ICD-10-CM

## 2020-02-03 PROCEDURE — 99214 OFFICE O/P EST MOD 30 MIN: CPT | Performed by: PHYSICIAN ASSISTANT

## 2020-02-03 RX ORDER — TRAMADOL HYDROCHLORIDE 50 MG/1
50-100 TABLET ORAL EVERY 6 HOURS PRN
Qty: 120 TABLET | Refills: 0 | Status: SHIPPED | OUTPATIENT
Start: 2020-02-03 | End: 2020-03-25

## 2020-02-03 RX ORDER — METHYLPHENIDATE HYDROCHLORIDE 10 MG/1
10 TABLET ORAL DAILY
Qty: 8 TABLET | Refills: 0 | Status: SHIPPED | OUTPATIENT
Start: 2020-02-03 | End: 2020-02-06

## 2020-02-03 NOTE — PROGRESS NOTES
"Subjective     Doug Galicia is a 44 year old male who presents to clinic today for the following health issues:    HPI   Chronic/Recurring Back Pain Follow Up      Where is your back pain located? (Select all that apply) low back both    How would you describe your back pain?  dull ache    Where does your back pain spread? nowhere    Since your last clinic visit for back pain, how has your pain changed? unchanged    Does your back pain interfere with your job? YES    Since your last visit, have you tried any new treatment? No      How many servings of fruits and vegetables do you eat daily?  2-3    On average, how many sweetened beverages do you drink each day (Examples: soda, juice, sweet tea, etc.  Do NOT count diet or artificially sweetened beverages)?   1    How many days per week do you exercise enough to make your heart beat faster? 3 or less    How many minutes a day do you exercise enough to make your heart beat faster? 30 - 60    How many days per week do you miss taking your medication? 0      Medication Followup of Ritalin    Taking Medication as prescribed: yes    Side Effects:  None    Medication Helping Symptoms:  Yes    Seems to be more \"on the ball\" at work  Wears off early than needed  Lasting ~6 hours, needing to be focuses for 9 hours       Patient Active Problem List   Diagnosis     SI (sacroiliac) joint dysfunction     Chronic low back pain     Long term current use of opiate analgesic     Vitamin B12 deficiency without anemia     Insomnia, psychophysiological     Chronic pain syndrome     Tourette syndrome     ADHD (attention deficit hyperactivity disorder)     Gastroesophageal reflux disease, esophagitis presence not specified     Past Surgical History:   Procedure Laterality Date     ARTHROSCOPY KNEE RT/LT      right     C NONSPECIFIC PROCEDURE      Testicular Surgery       Social History     Tobacco Use     Smoking status: Former Smoker     Types: Other     Smokeless tobacco: Current User " "    Types: Chew     Tobacco comment: \"years\" per pt. did not know date   Substance Use Topics     Alcohol use: No     Alcohol/week: 0.0 standard drinks     Family History   Problem Relation Age of Onset     Colon Cancer Mother          in , diagnosed early 50s     Neurologic Disorder Father         seizures     Diabetes No family hx of      Coronary Artery Disease No family hx of            Reviewed and updated as needed this visit by Provider         Review of Systems   ROS COMP: Constitutional, and psych systems are negative, except as otherwise noted.      Objective    /84   Pulse 95   Temp 97.8  F (36.6  C) (Tympanic)   Resp 16   Wt 100.6 kg (221 lb 12.8 oz)   SpO2 95%   BMI 28.48 kg/m    Body mass index is 28.48 kg/m .  Physical Exam   Constitutional: healthy, alert, active, no distress.    Head: Normocephalic  Musculoskeletal: extremities normal- no gross deformities noted, gait normal, normal muscle tone and able to move about the exam room without difficulty.    Skin: no suspicious lesions or rashes appreciated on exposed areas  Neurologic: Gait normal. Moving all extremities spontaneously, no apparent weakness.    Psychiatric: mentation appears normal, thoughts are clear and concise. Converses appropriately. Affect is Appropriate/mood-congruent          Assessment & Plan   Assessment  1. Attention deficit hyperactivity disorder (ADHD), unspecified ADHD type    2. SI (sacroiliac) joint dysfunction    3. Chronic pain syndrome    4. Chronic bilateral low back pain, unspecified whether sciatica present         Plan  1) Will continue Ritalin LA 40mg as it seems to be working well for ~6 hours. Will add Ritalin IR 10mg to the end of his work shift - hopefully this will extend his focus another 3-4 hours.     2-4) Tramadol renewed, no change. UDS up to date.     Patient Instructions   Send me an update on Friday about the new immediate release Ritalin.       BMI:   Estimated body mass index is " "28.48 kg/m  as calculated from the following:    Height as of 4/1/19: 1.88 m (6' 2\").    Weight as of this encounter: 100.6 kg (221 lb 12.8 oz).       Return in 4 days (on 2/7/2020) for Mychart follow up.    Blanche Barrera PA-C  [facility]          "

## 2020-02-03 NOTE — TELEPHONE ENCOUNTER
Routing refill request to provider for review/approval because:  Drug not on the [facility] refill protocol   Last script written 12/13/19 for 120 tablets  Last OV 9/17/19, due 3/17/20

## 2020-02-20 ENCOUNTER — MYC MEDICAL ADVICE (OUTPATIENT)
Dept: PALLIATIVE MEDICINE | Facility: CLINIC | Age: 45
End: 2020-02-20

## 2020-02-23 ENCOUNTER — HEALTH MAINTENANCE LETTER (OUTPATIENT)
Age: 45
End: 2020-02-23

## 2020-02-26 ENCOUNTER — MYC MEDICAL ADVICE (OUTPATIENT)
Dept: PALLIATIVE MEDICINE | Facility: CLINIC | Age: 45
End: 2020-02-26

## 2020-02-26 NOTE — TELEPHONE ENCOUNTER
Please call pt to schedule.    Karlie Sykes RN-BSN  [facility]

## 2020-02-26 NOTE — TELEPHONE ENCOUNTER
"Pre-screening Questions for Radiology Injections:    Injection to be done at which interventional clinic site? [facility] [place]    Instruct patient to arrive as directed prior to the scheduled appointment time:    [place] minutes before      [place]: 30 minutes before; if IV needed 1 hour before     Dr. Lopez-no IV needed for Cervical JAIME; please instruct to arrive 30\" early    Procedure ordered by Dr. Marte    Procedure ordered? bilateral SI joint injections      Transforaminal Cervical JAIME - no pain provider currently performing    What insurance would patient like us to bill for this procedure? [employer]      Worker's comp or MVA (motor vehicle accident) -Any injection DO NOT SCHEDULE and route to Tami Mccord.      [employer] insurance - For SI joint injections, DO NOT SCHEDULE and route Tami Mccord.       [employer] - Any injection besides hip/shoulder/knee joint DO NOT SCHEDULE and route to Tami Mccord. She will obtain PA and call pt back to schedule procedure or notify pt of denial.       [employer]-Route to Tami for review      **BCBS- ALL need to be routed to [place] for review if a PA is needed**      IF SCHEDULING IN [place] AND NEEDS A PA, IT IS OKAY TO SCHEDULE. [place] HANDLES THEIR OWN PA'S AFTER THE PATIENT IS SCHEDULED. PLEASE SCHEDULE AT LEAST 1 WEEK OUT SO A PA CAN BE OBTAINED.    Any chance of pregnancy? Not Applicable   If YES, do NOT schedule and route to RN pool    Is an  needed? No     Patient has a drive home? (mandatory) YES: Informed    Is patient taking any blood thinners (i.e. plavix, coumadin, jantoven, warfarin, heparin, pradaxa or dabigatran, etc)? No   If hold needed, do NOT schedule, route to RN pool     Is patient taking any aspirin products (includes Excedrin and Fiorinal)? Yes - Pt takes 250mg daily; instructed to hold 0 day(s) prior to procedure.      If more than 325mg/day, OK to schedule; Instruct pt to decrease to less than 325 " mg for 7 days AND route to RN pool    For CERVICAL procedures, hold all aspirin products for 6 days.     Tell pt that if aspirin product is not held for 6 days, the procedure WILL BE cancelled.      Does the patient have a bleeding or clotting disorder? No     If YES, okay to schedule AND route to RN nurse pool    For any patients with platelet count <100, must be forwarded to provider    Is patient diabetic?  No  If YES, instruct them to bring their glucometer.    Does patient have an active infection or treated for one within the past week? No     Is patient currently taking any antibiotics?  No     For patients on chronic, preventative, or prophylactic antibiotics, procedures may be scheduled.     For patients on antibiotics for active or recent infection:antibiotic course must have been completed for 4 days    Is patient currently taking any steroid medications? (i.e. Prednisone, Medrol)  No     For patients on steroid medications, course must have been completed for 4 days    Is patient actively being treated for cancer or immunocompromised? No  If YES, do NOT schedule and route to RN pool     Are you able to get on and off an exam table with minimal or no assistance? Yes  If NO, do NOT schedule and route to RN pool    Are you able to roll over and lay on your stomach with minimal or no assistance? Yes  If NO, do NOT schedule and route to RN pool     Any allergies to contrast dye, iodine, shellfish, or numbing and steroid medications? No  If YES, route to RN pool AND add allergy information to appointment notes    Allergies: Penicillins      Has the patient had a flu shot or any other vaccinations within 7 days before or after the procedure.  No     Does patient have an MRI/CT?  Not Applicable  Check Procedure Scheduling Grid to see if required.      Was the MRI done within the last 3 years?  NA    If yes, where was the MRI done i.e.[facility], [facility], [place], [facility] etc?       If no, do not  schedule and route to RN pool    If MRI was not done at [place], [facility] or [facility] do NOT schedule and route to RN pool.      If pt has an imaging disc, the injection MAY be scheduled but pt has to bring disc to appt.     If they show up without the disc the injection cannot be done    Procedure Specific Instructions:      If celiac plexus block, informed patient NPO for 6 hours and that it is okay to take medications with sips of water, especially blood pressure medications  Not Applicable         If this is for a cervical procedure, informed patient that aspirin needs to be held for 6 days.   Not Applicable      If IV needed:    Do not schedule procedures requiring IV placement in the first appointment of the day or first appointment after lunch. Do NOT schedule at 0745, 0815 or 1245.     Instructed pt to arrive 30 minutes early for IV start if required. (Check Procedure Scheduling Grid)  Not Applicable    Reminders:      If you are started on any steroids or antibiotics between now and your appointment, you must contact us because the procedure may need to be cancelled.  Yes      For all procedures except radiofrequency ablations (RFAs) and spinal cord stimulator (SCS) trials, informed patient:    IV sedation is not provided for this procedure.  If you feel that an oral anti-anxiety medication is needed, you can discuss this further with your referring provider or primary care provider.  The Pain Clinic provider will discuss specifics of what the procedure includes at your appointment.  Most procedures last 10-20 minutes.  We use numbing medications to help with any discomfort during the procedure.  Not Applicable      For patients 85 or older we recommend having an adult stay w/ them for the remainder of the day.       Does the patient have any questions?  NO  Holley Tracey  [facility]

## 2020-02-26 NOTE — TELEPHONE ENCOUNTER
From: Abdias Galicia      Created: 2/26/2020 11:23 AM        *-*-*This message has not been handled.*-*-*    I called about 11:20am today to get my SI injections set up. But the lady I spoke to was unable to find any order for shots. Wasn't sure if the order timed out or such.....    TJ

## 2020-02-26 NOTE — TELEPHONE ENCOUNTER
See the 1/21/20 encounter for more information.  The order is in.   notified to call pt.    Karlie Sykes RN-BSN  [facility]

## 2020-03-19 ENCOUNTER — TELEPHONE (OUTPATIENT)
Dept: PALLIATIVE MEDICINE | Facility: CLINIC | Age: 45
End: 2020-03-19

## 2020-03-19 NOTE — TELEPHONE ENCOUNTER
Attempted to call Pt again, no answer.  LVM for Pt and sent Pt a BESOS Message.    Jem Moe, RN  Care Coordinator   [facility] [place]

## 2020-03-19 NOTE — TELEPHONE ENCOUNTER
Writer called pt to cancel procedure on 3/24/2020.  LVM informing of cancellation and requested that Pt call back after two weeks    Rescheduling pain procedure due to COVID 19      We re taking every precaution to prevent the spread of COVID-19. Our top priority is to protect and care for our patients. Changes are being made across the country to cancel most elective procedures, given the risk of exposure to COVID 19 to you and our staff.  In addition, if you are getting a steroid, receiving steroids can decrease your body s ability to fight infection.  This would be a big concern during this COVID-19 pandemic.      We will be cancelling your upcoming procedure.      Assess if patient is currently on blood thinner hold:  no.  Plan (if yes): na      As we continue to monitor the impact of COVID-19, we ask that you reach out to us in a couple of weeks to find out if we are able to reschedule your procedure at [phone].    If you have questions between now and when call us in a few weeks, please do not hesitate to call us.    Do you have any additional questions about COVID-19?    Please visit the [place] Department of Health ([facility]) or Centers for Disease Control (CDC) websites for the latest information on COVID-19.    Please take care of yourself during this time and practice recommended safety measures including social distancing and good hand hygiene. If you develop symptoms, please contact [email or web address] or [phone].    Document in appt note, cancel visit, and close encounter.      Jem Moe RN on 3/19/2020 at 10:54 AM

## 2020-03-20 NOTE — TELEPHONE ENCOUNTER
WAKU WAKU ???? User  Last Read On    Abdias GALLAGHER Galicia  3/19/2020  4:38 PM      Laura SALCEDO, RN Care Coordinator  [facility]  Pain [facility]

## 2020-04-05 ENCOUNTER — MYC REFILL (OUTPATIENT)
Dept: FAMILY MEDICINE | Facility: CLINIC | Age: 45
End: 2020-04-05

## 2020-04-05 DIAGNOSIS — F90.9 ATTENTION DEFICIT HYPERACTIVITY DISORDER (ADHD), UNSPECIFIED ADHD TYPE: ICD-10-CM

## 2020-04-06 ENCOUNTER — TELEPHONE (OUTPATIENT)
Dept: PEDIATRICS | Facility: CLINIC | Age: 45
End: 2020-04-06

## 2020-04-06 RX ORDER — METHYLPHENIDATE HYDROCHLORIDE 10 MG/1
10 TABLET ORAL DAILY
Qty: 30 TABLET | Refills: 0 | Status: SHIPPED | OUTPATIENT
Start: 2020-04-06 | End: 2020-04-08

## 2020-04-06 RX ORDER — METHYLPHENIDATE HYDROCHLORIDE 40 MG/1
40 CAPSULE, EXTENDED RELEASE ORAL EVERY MORNING
Qty: 30 CAPSULE | Refills: 0 | Status: SHIPPED | OUTPATIENT
Start: 2020-04-06 | End: 2020-04-08

## 2020-04-06 NOTE — TELEPHONE ENCOUNTER
Yeny states she would like to know if she can ignore the directions on prescription for methylphenidate that says start on 3-14 as this date has passed.  Please call.    Thank you.

## 2020-04-06 NOTE — TELEPHONE ENCOUNTER
Routing refill request to provider for review/approval because:  Drug not on the [facility] refill protocol     Last script written 3/12/20, pt not due yet but he wanted to get his request in.     Last OV 2/3/20

## 2020-04-08 ENCOUNTER — MYC REFILL (OUTPATIENT)
Dept: FAMILY MEDICINE | Facility: CLINIC | Age: 45
End: 2020-04-08

## 2020-04-08 ENCOUNTER — MYC MEDICAL ADVICE (OUTPATIENT)
Dept: FAMILY MEDICINE | Facility: CLINIC | Age: 45
End: 2020-04-08

## 2020-04-08 DIAGNOSIS — G47.09 OTHER INSOMNIA: ICD-10-CM

## 2020-04-08 DIAGNOSIS — F90.9 ATTENTION DEFICIT HYPERACTIVITY DISORDER (ADHD), UNSPECIFIED ADHD TYPE: ICD-10-CM

## 2020-04-08 RX ORDER — METHYLPHENIDATE HYDROCHLORIDE 40 MG/1
40 CAPSULE, EXTENDED RELEASE ORAL EVERY MORNING
Qty: 30 CAPSULE | Refills: 0 | Status: SHIPPED | OUTPATIENT
Start: 2020-04-11 | End: 2020-06-17

## 2020-04-08 RX ORDER — METHYLPHENIDATE HYDROCHLORIDE 10 MG/1
10 TABLET ORAL DAILY
Qty: 30 TABLET | Refills: 0 | Status: SHIPPED | OUTPATIENT
Start: 2020-04-11 | End: 2020-06-17

## 2020-04-08 NOTE — TELEPHONE ENCOUNTER
Routing refill request to provider for review/approval because:  Drug not on the [facility] refill protocol. Correction made with date, please review and approval if correct

## 2020-04-09 RX ORDER — ZOLPIDEM TARTRATE 10 MG/1
10 TABLET ORAL
Qty: 30 TABLET | Refills: 2 | Status: SHIPPED | OUTPATIENT
Start: 2020-04-09 | End: 2020-06-17

## 2020-04-09 NOTE — TELEPHONE ENCOUNTER
Both doses of Ritalin authorized by Dr. Naren Jackson with a start date of 4/11/20.     Rolanda Morales RN BSN

## 2020-04-09 NOTE — TELEPHONE ENCOUNTER
Routing refill request to provider for review/approval because:  Drug not on the [facility] refill protocol     Last written on 1/20/20 for 30 tablets/2 refills.     Rolanda Morales RN BSN

## 2020-05-31 ENCOUNTER — MYC REFILL (OUTPATIENT)
Dept: FAMILY MEDICINE | Facility: CLINIC | Age: 45
End: 2020-05-31

## 2020-05-31 DIAGNOSIS — F90.9 ATTENTION DEFICIT HYPERACTIVITY DISORDER (ADHD), UNSPECIFIED ADHD TYPE: ICD-10-CM

## 2020-06-02 RX ORDER — METHYLPHENIDATE HYDROCHLORIDE 40 MG/1
40 CAPSULE, EXTENDED RELEASE ORAL EVERY MORNING
Qty: 30 CAPSULE | Refills: 0 | Status: SHIPPED | OUTPATIENT
Start: 2020-06-02 | End: 2020-06-17

## 2020-06-02 RX ORDER — METHYLPHENIDATE HYDROCHLORIDE 10 MG/1
10 TABLET ORAL DAILY
Qty: 30 TABLET | Refills: 0 | Status: SHIPPED | OUTPATIENT
Start: 2020-06-02 | End: 2020-06-17

## 2020-06-02 NOTE — TELEPHONE ENCOUNTER
Routing refill request to provider for review/approval because:  Drug not on the [facility] refill protocol     Last OV with Blanche: 2/3/2020 with advised F/U in 4 days which patient completed.    Last written: 5/9/2020    Radha Pelayo, RN, BSN

## 2020-06-27 ENCOUNTER — MYC MEDICAL ADVICE (OUTPATIENT)
Dept: FAMILY MEDICINE | Facility: CLINIC | Age: 45
End: 2020-06-27

## 2020-07-09 ENCOUNTER — MYC MEDICAL ADVICE (OUTPATIENT)
Dept: PALLIATIVE MEDICINE | Facility: CLINIC | Age: 45
End: 2020-07-09

## 2020-07-09 DIAGNOSIS — M53.3 SI (SACROILIAC) JOINT DYSFUNCTION: Primary | ICD-10-CM

## 2020-07-09 NOTE — TELEPHONE ENCOUNTER
Abdias,  We did reopen for procedures when the Governor allowed in [place].  We use measures at the clinic to limit the risks (masking, social distancing), but there is a potential risk that steroid could decrease your body's ability to fight infection.     In general, studies show that when steroids have been given, the immune response can be weakened.   The challenge is that most of these studies were done with oral or IV forms of the steroid.   The way we give the medication is different, so how it absorbs into the body is different.  In studies where high doses were given into an IV, the immune response is weakened for several weeks.    The amount we give is much lower and is slowly absorbed over time, rather than all at once.    We use the oral and IV studies to show that immune weakening has happened to patients after receiving steroids and need to consider this, along with the differences, when injecting steroids into the spine or joints.  There have been no studies of this effect with COVID-19 as it is so new.       I hope this information will help you make a decision that is best for you in regard to having an injection.  I did put a new order in so you can schedule.     Radha Marte MD  [facility]

## 2020-07-09 NOTE — TELEPHONE ENCOUNTER
[employer] message from patient on 7/9 at 0250:    Just had a few questions, will try to keep short and simple here....   Was set up for SI injections a little ways back, which canceled due to Covid19. Had it set up again shortly after, obviously the out come was the same. That said...     1)  With the clearly likely chance of Covid19 spiking again (statement media based), are things like SI injections on the table at this         point?  If not any thoughts on when it may be?        * I don't want to set up injections if medical team is still trying to get caught up, so folks that need help now can get it first.     2)  It's been a little while since my SI injections where ordered (just before everything was put on hold via Covid). How long do the         orders last? Do I need to ask for a second order before hand?     TJ   ------------  Reviewed chart. Order for bilateral SI joint injections ordered on 1/21. It will be best to obtain a new order since it has been greater than 6 months.   -------------  Message sent to pt and order prepped.     Kaushik Calero,     We have opened up for all types of injections and would be happy to work on getting you in for the SI joint injections that have been delayed due to COVID! The last order was dated 1/21/20 and I am going to request that Radha Marte MD put in a new one since it has been greater than 6 months. Radha Marte MD's first available injection appointments are in early August. We can put you on a waitlist once you are scheduled just in case there is a cancellation before then.     Someone will call you once the order has been processed.     Take care,     ASHLEY LopezN, RN-BC  Patient Care Supervisor  [facility] [place]

## 2020-07-13 NOTE — TELEPHONE ENCOUNTER
Pt will be putting injection on hold. He will call back to schedule bilateral SI joint injections       Jhonathan BURR    [facility] [place]

## 2020-07-23 ENCOUNTER — MYC MEDICAL ADVICE (OUTPATIENT)
Dept: FAMILY MEDICINE | Facility: CLINIC | Age: 45
End: 2020-07-23

## 2020-08-09 ENCOUNTER — MYC MEDICAL ADVICE (OUTPATIENT)
Dept: FAMILY MEDICINE | Facility: CLINIC | Age: 45
End: 2020-08-09

## 2020-08-09 DIAGNOSIS — F90.9 ATTENTION DEFICIT HYPERACTIVITY DISORDER (ADHD), UNSPECIFIED ADHD TYPE: ICD-10-CM

## 2020-08-11 NOTE — TELEPHONE ENCOUNTER
Routing refill request to provider for review/approval because:  Drug not on the [facility] refill protocol     Methylphenidate (RITALIN LA) 40 MG 24 hr capsule   Methylphenidate (RITALIN) 10 MG tablet     Last Written Prescription Date:  6/30/20 with fill date 7/10/20    Last Fill Quantity: #30 each  refills: 0     Last office visit: 2/3/2020 with prescribing provider:  Blanche Barrera PA-C     Future Office Visit:  DUE FOR 6 Month Follow up (unable to keep appointment this morning).     Rolanda Morales RN BSN

## 2020-08-12 RX ORDER — METHYLPHENIDATE HYDROCHLORIDE 10 MG/1
10 TABLET ORAL DAILY
Qty: 15 TABLET | Refills: 0 | Status: SHIPPED | OUTPATIENT
Start: 2020-08-12 | End: 2020-08-19

## 2020-08-12 RX ORDER — METHYLPHENIDATE HYDROCHLORIDE 40 MG/1
40 CAPSULE, EXTENDED RELEASE ORAL EVERY MORNING
Qty: 15 CAPSULE | Refills: 0 | Status: SHIPPED | OUTPATIENT
Start: 2020-08-12 | End: 2020-08-19

## 2020-08-19 ENCOUNTER — OFFICE VISIT (OUTPATIENT)
Dept: FAMILY MEDICINE | Facility: CLINIC | Age: 45
End: 2020-08-19
Payer: COMMERCIAL

## 2020-08-19 VITALS
SYSTOLIC BLOOD PRESSURE: 128 MMHG | WEIGHT: 223 LBS | RESPIRATION RATE: 16 BRPM | BODY MASS INDEX: 28.63 KG/M2 | OXYGEN SATURATION: 97 % | DIASTOLIC BLOOD PRESSURE: 87 MMHG | TEMPERATURE: 98.6 F | HEART RATE: 110 BPM

## 2020-08-19 DIAGNOSIS — G89.29 CHRONIC BILATERAL LOW BACK PAIN, UNSPECIFIED WHETHER SCIATICA PRESENT: Chronic | ICD-10-CM

## 2020-08-19 DIAGNOSIS — F90.9 ATTENTION DEFICIT HYPERACTIVITY DISORDER (ADHD), UNSPECIFIED ADHD TYPE: Primary | ICD-10-CM

## 2020-08-19 DIAGNOSIS — G89.4 CHRONIC PAIN SYNDROME: Chronic | ICD-10-CM

## 2020-08-19 DIAGNOSIS — M54.50 CHRONIC BILATERAL LOW BACK PAIN, UNSPECIFIED WHETHER SCIATICA PRESENT: Chronic | ICD-10-CM

## 2020-08-19 PROCEDURE — 99214 OFFICE O/P EST MOD 30 MIN: CPT | Performed by: PHYSICIAN ASSISTANT

## 2020-08-19 RX ORDER — DEXTROAMPHETAMINE SACCHARATE, AMPHETAMINE ASPARTATE MONOHYDRATE, DEXTROAMPHETAMINE SULFATE AND AMPHETAMINE SULFATE 5; 5; 5; 5 MG/1; MG/1; MG/1; MG/1
20 CAPSULE, EXTENDED RELEASE ORAL DAILY
Qty: 30 CAPSULE | Refills: 0 | Status: SHIPPED | OUTPATIENT
Start: 2020-08-19 | End: 2020-09-08

## 2020-08-19 RX ORDER — DEXTROAMPHETAMINE SACCHARATE, AMPHETAMINE ASPARTATE, DEXTROAMPHETAMINE SULFATE AND AMPHETAMINE SULFATE 3.75; 3.75; 3.75; 3.75 MG/1; MG/1; MG/1; MG/1
15 TABLET ORAL DAILY
Qty: 30 TABLET | Refills: 0 | Status: SHIPPED | OUTPATIENT
Start: 2020-08-19 | End: 2020-09-10

## 2020-08-19 NOTE — PROGRESS NOTES
Subjective     Doug Galicia is a 44 year old male who presents to clinic today for the following health issues:    HPI     Medication Followup of Methylphenidate (Ritalin) 10 mg (3-5 hours)    Taking Medication as prescribed: NO-taking in the morning     Side Effects:  Light headaches    Medication Helping Symptoms:  Yes- but not as much    Mild headaches, GERD     Medication Followup of Methylphenidate (Ritalin) 40 mg (6-8 hours)    Taking Medication as prescribed: NO-taking in the evening     Side Effects:  Light headaches     Medication Helping Symptoms:  Yes-but not as much    Mild headaches, GERD     Duration of action: Immediate-release tablet: 4 to 6 hours (Zak 2009); Adderall XR: 8 to 12 hours  Working 6 days per week  Starts 3am-noon then works a second job, finishes up by 6-7pm      FMLA forms -update  Missing 3-5 days per month, sometimes 1-1.5 days per episode  Needing repeat steroid injections, holding off until absolutely necessary        Review of Systems   Constitutional, and psych systems are negative, except as otherwise noted.      Objective    /87   Pulse 110   Temp 98.6  F (37  C) (Tympanic)   Resp 16   Wt 101.2 kg (223 lb)   SpO2 97%   BMI 28.63 kg/m    Body mass index is 28.63 kg/m .  Physical Exam   GENERAL: healthy, alert and no distress  MS: no gross musculoskeletal defects noted, no edema  SKIN: no suspicious lesions or rashes  NEURO: Normal strength and tone, mentation intact and speech normal  PSYCH: mentation appears normal, affect normal/bright          Assessment & Plan     1. Attention deficit hyperactivity disorder (ADHD), unspecified ADHD type    2. Chronic bilateral low back pain, unspecified whether sciatica present    3. Chronic pain syndrome         1) Will switch to Adderall XR 20 and IR 15. Side effects with Ritalin. Follow up in 1 month to reassess.     2,3) FMLA filled out. Updated to 3 episodes per month, 1-2 days per episode. Does not feel a repeat  injection is needed at this time, but will follow up with pain management if needed.       Return in about 1 month (around 9/19/2020) for ADHD follow up.    Blanche Barrera PA-C  [facility]

## 2020-09-01 DIAGNOSIS — K21.9 GASTROESOPHAGEAL REFLUX DISEASE, ESOPHAGITIS PRESENCE NOT SPECIFIED: ICD-10-CM

## 2020-09-03 ENCOUNTER — TELEPHONE (OUTPATIENT)
Dept: FAMILY MEDICINE | Facility: CLINIC | Age: 45
End: 2020-09-03

## 2020-09-03 RX ORDER — OMEPRAZOLE 20 MG/1
TABLET, DELAYED RELEASE ORAL
Qty: 90 TABLET | Refills: 0 | OUTPATIENT
Start: 2020-09-03

## 2020-09-03 NOTE — TELEPHONE ENCOUNTER
"Prescription approved per [facility] Refill Protocol for current omeprazole ordered  Removed request from pharmacy for other omeprazole never ordered for pt.         Requested Prescriptions   Pending Prescriptions Disp Refills     omeprazole (PRILOSEC) 20 MG DR capsule 90 capsule 0     Sig: Take 1 capsule (20 mg) by mouth daily       PPI Protocol Passed - 9/3/2020 12:29 PM        Passed - Not on Clopidogrel (unless Pantoprazole ordered)        Passed - No diagnosis of osteoporosis on record        Passed - Recent (12 mo) or future (30 days) visit within the authorizing provider's specialty     Patient has had an office visit with the authorizing provider or a provider within the authorizing providers department within the previous 12 mos or has a future within next 30 days. See \"Patient Info\" tab in inbasket, or \"Choose Columns\" in Meds & Orders section of the refill encounter.              Passed - Medication is active on med list        Passed - Patient is age 18 or older         Refused Prescriptions Disp Refills     KLS OMPERAZOLE 20 MG tablet [Pharmacy Med Name: KLS Omeprazole Oral Tablet Delayed Release 20 MG] 90 tablet 0     Sig: TAKE 1 TABLET BY MOUTH ONCE DAILY       PPI Protocol Passed - 9/3/2020 12:29 PM        Passed - Not on Clopidogrel (unless Pantoprazole ordered)        Passed - No diagnosis of osteoporosis on record        Passed - Recent (12 mo) or future (30 days) visit within the authorizing provider's specialty     Patient has had an office visit with the authorizing provider or a provider within the authorizing providers department within the previous 12 mos or has a future within next 30 days. See \"Patient Info\" tab in [email or web address], or \"Choose Columns\" in Meds & Orders section of the refill encounter.              Passed - Medication is active on med list        Passed - Patient is age 18 or older               "

## 2020-09-03 NOTE — TELEPHONE ENCOUNTER
Reason for call:  Medication   If this is a refill request, has the caller requested the refill from the pharmacy already? Yes  Will the patient be using a [facility]? No  Name of the pharmacy and phone number for the current request:   [id number] - [place], [place] - [address] [address] [facility]   P: [phone]   F: [phone]     Name of the medication requested: omeprazole (PRILOSEC) 20 MG DR Tablet     Other request: Needs to be tablets also the pharmacy is requesting a call back to discuss     Phone number to reach patient:  Home number on file [phone] (home)    Best Time:  any    Can we leave a detailed message on this number?  YES    Travel screening: Negative

## 2020-09-16 ENCOUNTER — VIRTUAL VISIT (OUTPATIENT)
Dept: FAMILY MEDICINE | Facility: CLINIC | Age: 45
End: 2020-09-16
Payer: COMMERCIAL

## 2020-09-16 DIAGNOSIS — F90.9 ATTENTION DEFICIT HYPERACTIVITY DISORDER (ADHD), UNSPECIFIED ADHD TYPE: ICD-10-CM

## 2020-09-16 PROCEDURE — 99213 OFFICE O/P EST LOW 20 MIN: CPT | Mod: TEL | Performed by: PHYSICIAN ASSISTANT

## 2020-09-16 RX ORDER — DEXTROAMPHETAMINE SACCHARATE, AMPHETAMINE ASPARTATE MONOHYDRATE, DEXTROAMPHETAMINE SULFATE AND AMPHETAMINE SULFATE 5; 5; 5; 5 MG/1; MG/1; MG/1; MG/1
20 CAPSULE, EXTENDED RELEASE ORAL DAILY
Qty: 30 CAPSULE | Refills: 0 | Status: SHIPPED | OUTPATIENT
Start: 2020-09-16 | End: 2020-12-02

## 2020-09-16 RX ORDER — DEXTROAMPHETAMINE SACCHARATE, AMPHETAMINE ASPARTATE MONOHYDRATE, DEXTROAMPHETAMINE SULFATE AND AMPHETAMINE SULFATE 5; 5; 5; 5 MG/1; MG/1; MG/1; MG/1
20 CAPSULE, EXTENDED RELEASE ORAL DAILY
Qty: 30 CAPSULE | Refills: 0 | Status: SHIPPED | OUTPATIENT
Start: 2020-11-15 | End: 2021-02-10

## 2020-09-16 RX ORDER — DEXTROAMPHETAMINE SACCHARATE, AMPHETAMINE ASPARTATE, DEXTROAMPHETAMINE SULFATE AND AMPHETAMINE SULFATE 3.75; 3.75; 3.75; 3.75 MG/1; MG/1; MG/1; MG/1
15 TABLET ORAL DAILY
Qty: 30 TABLET | Refills: 0 | Status: SHIPPED | OUTPATIENT
Start: 2020-11-15 | End: 2020-12-02

## 2020-09-16 RX ORDER — DEXTROAMPHETAMINE SACCHARATE, AMPHETAMINE ASPARTATE MONOHYDRATE, DEXTROAMPHETAMINE SULFATE AND AMPHETAMINE SULFATE 5; 5; 5; 5 MG/1; MG/1; MG/1; MG/1
20 CAPSULE, EXTENDED RELEASE ORAL DAILY
Qty: 30 CAPSULE | Refills: 0 | Status: SHIPPED | OUTPATIENT
Start: 2020-10-16 | End: 2020-12-02

## 2020-09-16 RX ORDER — DEXTROAMPHETAMINE SACCHARATE, AMPHETAMINE ASPARTATE, DEXTROAMPHETAMINE SULFATE AND AMPHETAMINE SULFATE 3.75; 3.75; 3.75; 3.75 MG/1; MG/1; MG/1; MG/1
15 TABLET ORAL DAILY
Qty: 30 TABLET | Refills: 0 | Status: SHIPPED | OUTPATIENT
Start: 2020-09-16 | End: 2020-12-02

## 2020-09-16 RX ORDER — DEXTROAMPHETAMINE SACCHARATE, AMPHETAMINE ASPARTATE, DEXTROAMPHETAMINE SULFATE AND AMPHETAMINE SULFATE 3.75; 3.75; 3.75; 3.75 MG/1; MG/1; MG/1; MG/1
15 TABLET ORAL DAILY
Qty: 30 TABLET | Refills: 0 | Status: SHIPPED | OUTPATIENT
Start: 2020-10-16 | End: 2020-12-02

## 2020-09-16 NOTE — PROGRESS NOTES
"Doug Galicia is a 44 year old male who is being evaluated via a billable telephone visit.      The patient has been notified of following:     \"This telephone visit will be conducted via a call between you and your physician/provider. We have found that certain health care needs can be provided without the need for a physical exam.  This service lets us provide the care you need with a short phone conversation.  If a prescription is necessary we can send it directly to your pharmacy.  If lab work is needed we can place an order for that and you can then stop by our lab to have the test done at a later time.    Telephone visits are billed at different rates depending on your insurance coverage. During this emergency period, for some insurers they may be billed the same as an in-person visit.  Please reach out to your insurance provider with any questions.    If during the course of the call the physician/provider feels a telephone visit is not appropriate, you will not be charged for this service.\"    Patient has given verbal consent for Telephone visit?  Yes    What phone number would you like to be contacted at? [phone]    How would you like to obtain your AVS? Trevor Harris     Doug Galicia is a 44 year old male who presents via phone visit today for the following health issues:    HPI    ADHD medication Follow up  Pt reports no concerns or problems with current medication.        How many servings of fruits and vegetables do you eat daily?  4 or more    On average, how many sweetened beverages do you drink each day (Examples: soda, juice, sweet tea, etc.  Do NOT count diet or artificially sweetened beverages)?   1    How many days per week do you exercise enough to make your heart beat faster? 3 or less    How many minutes a day do you exercise enough to make your heart beat faster? 9 or less    How many days per week do you miss taking your medication? 0       Things are going very well since " making a dose adjustment last month  Would like to continue current regimen      Review of Systems   Constitutional, and psych systems are negative, except as otherwise noted.       Objective          Vitals:  No vitals were obtained today due to virtual visit.    healthy, alert and no distress  PSYCH: Alert and oriented times 3; coherent speech, normal   rate and volume, able to articulate logical thoughts, able   to abstract reason, no tangential thoughts, no hallucinations   or delusions  His affect is normal and pleasant  RESP: No cough, no audible wheezing, able to talk in full sentences  Remainder of exam unable to be completed due to telephone visits        Assessment/Plan:    Assessment & Plan     1. Attention deficit hyperactivity disorder (ADHD), unspecified ADHD type            Meds renewed, no changes. ADHD under good control.       Return in about 6 months (around 3/16/2021) for ADHD follow up.    Blanche Barrera PA-C  [facility]    Phone call duration:  5 minutes

## 2020-09-19 DIAGNOSIS — Z01.89 PATIENT REQUEST FOR DIAGNOSTIC TESTING: ICD-10-CM

## 2020-09-19 PROCEDURE — U0003 INFECTIOUS AGENT DETECTION BY NUCLEIC ACID (DNA OR RNA); SEVERE ACUTE RESPIRATORY SYNDROME CORONAVIRUS 2 (SARS-COV-2) (CORONAVIRUS DISEASE [COVID-19]), AMPLIFIED PROBE TECHNIQUE, MAKING USE OF HIGH THROUGHPUT TECHNOLOGIES AS DESCRIBED BY CMS-2020-01-R: HCPCS | Performed by: PHYSICIAN ASSISTANT

## 2020-09-20 LAB
SARS-COV-2 RNA SPEC QL NAA+PROBE: NOT DETECTED
SPECIMEN SOURCE: NORMAL

## 2020-11-02 ENCOUNTER — MYC REFILL (OUTPATIENT)
Dept: FAMILY MEDICINE | Facility: CLINIC | Age: 45
End: 2020-11-02

## 2020-11-02 DIAGNOSIS — G47.09 OTHER INSOMNIA: ICD-10-CM

## 2020-11-02 RX ORDER — ZOLPIDEM TARTRATE 10 MG/1
10 TABLET ORAL
Qty: 90 TABLET | Refills: 1 | Status: CANCELLED | OUTPATIENT
Start: 2020-11-02

## 2020-11-02 NOTE — TELEPHONE ENCOUNTER
Last Written Prescription Date:  6/30/2020  Last Fill Quantity: 90,  # refills: 1     Refused refill as patient has refills available. Refilled on 6/30/2020 for 90 day supply with 1 refills. Patient should have enough to last until end of December.    [email or web address] message sent to patient and request removed.    Closing encounter.    Radha Pelayo, RN, BSN

## 2020-12-04 ENCOUNTER — MYC MEDICAL ADVICE (OUTPATIENT)
Dept: FAMILY MEDICINE | Facility: CLINIC | Age: 45
End: 2020-12-04

## 2020-12-04 NOTE — TELEPHONE ENCOUNTER
Pharmacy confirmed that patient filled 30 pills last on 11/12/20 and insurance will allow next fill on 12/9/20.     Patient sent a my chart message stating he has a week of medication in his pill box.     oRlanda Morales RN BSN

## 2020-12-13 ENCOUNTER — HEALTH MAINTENANCE LETTER (OUTPATIENT)
Age: 45
End: 2020-12-13

## 2021-01-30 ENCOUNTER — MYC REFILL (OUTPATIENT)
Dept: FAMILY MEDICINE | Facility: CLINIC | Age: 46
End: 2021-01-30

## 2021-01-30 DIAGNOSIS — M54.50 CHRONIC BILATERAL LOW BACK PAIN, UNSPECIFIED WHETHER SCIATICA PRESENT: ICD-10-CM

## 2021-01-30 DIAGNOSIS — M53.3 SI (SACROILIAC) JOINT DYSFUNCTION: ICD-10-CM

## 2021-01-30 DIAGNOSIS — G89.4 CHRONIC PAIN SYNDROME: Chronic | ICD-10-CM

## 2021-01-30 DIAGNOSIS — G89.29 CHRONIC BILATERAL LOW BACK PAIN, UNSPECIFIED WHETHER SCIATICA PRESENT: ICD-10-CM

## 2021-01-30 RX ORDER — TRAMADOL HYDROCHLORIDE 50 MG/1
50-100 TABLET ORAL EVERY 6 HOURS PRN
Qty: 120 TABLET | Refills: 0 | Status: CANCELLED | OUTPATIENT
Start: 2021-01-30

## 2021-02-07 ENCOUNTER — MYC REFILL (OUTPATIENT)
Dept: FAMILY MEDICINE | Facility: CLINIC | Age: 46
End: 2021-02-07

## 2021-02-07 DIAGNOSIS — F90.9 ATTENTION DEFICIT HYPERACTIVITY DISORDER (ADHD), UNSPECIFIED ADHD TYPE: ICD-10-CM

## 2021-02-09 NOTE — TELEPHONE ENCOUNTER
Requested Prescriptions   Pending Prescriptions Disp Refills     amphetamine-dextroamphetamine (ADDERALL XR) 20 MG 24 hr capsule 30 capsule 0     Sig: Take 1 capsule (20 mg) by mouth daily - Must start medication on 1/16       There is no refill protocol information for this order        amphetamine-dextroamphetamine (ADDERALL) 15 MG tablet 30 tablet 0     Sig: Take 1 tablet (15 mg) by mouth daily - Must start medication on 12/17       There is no refill protocol information for this order        Last Written Prescription Date:  1/14/21  Last Fill Quantity: 30,  # refills: 0   Last office visit: 9/16/20 with prescribing provider.      Routing refill request to provider for review/approval because:  Drug not on the [facility] refill protocol     Sandra SALCEDO-RN  Triage Nurse  [facility]: [facility]

## 2021-02-10 RX ORDER — DEXTROAMPHETAMINE SACCHARATE, AMPHETAMINE ASPARTATE MONOHYDRATE, DEXTROAMPHETAMINE SULFATE AND AMPHETAMINE SULFATE 5; 5; 5; 5 MG/1; MG/1; MG/1; MG/1
20 CAPSULE, EXTENDED RELEASE ORAL DAILY
Qty: 30 CAPSULE | Refills: 0 | Status: SHIPPED | OUTPATIENT
Start: 2021-02-13 | End: 2021-03-15

## 2021-02-10 RX ORDER — DEXTROAMPHETAMINE SACCHARATE, AMPHETAMINE ASPARTATE, DEXTROAMPHETAMINE SULFATE AND AMPHETAMINE SULFATE 3.75; 3.75; 3.75; 3.75 MG/1; MG/1; MG/1; MG/1
15 TABLET ORAL DAILY
Qty: 30 TABLET | Refills: 0 | Status: SHIPPED | OUTPATIENT
Start: 2021-02-13 | End: 2021-03-15

## 2021-03-15 ENCOUNTER — VIRTUAL VISIT (OUTPATIENT)
Dept: FAMILY MEDICINE | Facility: CLINIC | Age: 46
End: 2021-03-15
Payer: COMMERCIAL

## 2021-03-15 DIAGNOSIS — F32.A DEPRESSION, UNSPECIFIED DEPRESSION TYPE: Primary | ICD-10-CM

## 2021-03-15 DIAGNOSIS — F90.9 ATTENTION DEFICIT HYPERACTIVITY DISORDER (ADHD), UNSPECIFIED ADHD TYPE: ICD-10-CM

## 2021-03-15 PROCEDURE — 96127 BRIEF EMOTIONAL/BEHAV ASSMT: CPT | Mod: 59 | Performed by: PHYSICIAN ASSISTANT

## 2021-03-15 PROCEDURE — 99214 OFFICE O/P EST MOD 30 MIN: CPT | Mod: TEL | Performed by: PHYSICIAN ASSISTANT

## 2021-03-15 RX ORDER — DEXTROAMPHETAMINE SACCHARATE, AMPHETAMINE ASPARTATE, DEXTROAMPHETAMINE SULFATE AND AMPHETAMINE SULFATE 3.75; 3.75; 3.75; 3.75 MG/1; MG/1; MG/1; MG/1
15 TABLET ORAL DAILY
Qty: 30 TABLET | Refills: 0 | Status: SHIPPED | OUTPATIENT
Start: 2021-04-14 | End: 2021-05-14

## 2021-03-15 RX ORDER — DEXTROAMPHETAMINE SACCHARATE, AMPHETAMINE ASPARTATE MONOHYDRATE, DEXTROAMPHETAMINE SULFATE AND AMPHETAMINE SULFATE 5; 5; 5; 5 MG/1; MG/1; MG/1; MG/1
20 CAPSULE, EXTENDED RELEASE ORAL DAILY
Qty: 30 CAPSULE | Refills: 0 | Status: SHIPPED | OUTPATIENT
Start: 2021-05-14 | End: 2021-06-07

## 2021-03-15 RX ORDER — DEXTROAMPHETAMINE SACCHARATE, AMPHETAMINE ASPARTATE, DEXTROAMPHETAMINE SULFATE AND AMPHETAMINE SULFATE 3.75; 3.75; 3.75; 3.75 MG/1; MG/1; MG/1; MG/1
15 TABLET ORAL DAILY
Qty: 30 TABLET | Refills: 0 | Status: SHIPPED | OUTPATIENT
Start: 2021-03-15 | End: 2021-05-14

## 2021-03-15 RX ORDER — DEXTROAMPHETAMINE SACCHARATE, AMPHETAMINE ASPARTATE, DEXTROAMPHETAMINE SULFATE AND AMPHETAMINE SULFATE 3.75; 3.75; 3.75; 3.75 MG/1; MG/1; MG/1; MG/1
15 TABLET ORAL DAILY
Qty: 30 TABLET | Refills: 0 | Status: CANCELLED | OUTPATIENT
Start: 2021-03-15

## 2021-03-15 RX ORDER — DEXTROAMPHETAMINE SACCHARATE, AMPHETAMINE ASPARTATE MONOHYDRATE, DEXTROAMPHETAMINE SULFATE AND AMPHETAMINE SULFATE 5; 5; 5; 5 MG/1; MG/1; MG/1; MG/1
20 CAPSULE, EXTENDED RELEASE ORAL DAILY
Qty: 30 CAPSULE | Refills: 0 | Status: SHIPPED | OUTPATIENT
Start: 2021-03-15 | End: 2021-05-14

## 2021-03-15 RX ORDER — DEXTROAMPHETAMINE SACCHARATE, AMPHETAMINE ASPARTATE MONOHYDRATE, DEXTROAMPHETAMINE SULFATE AND AMPHETAMINE SULFATE 5; 5; 5; 5 MG/1; MG/1; MG/1; MG/1
20 CAPSULE, EXTENDED RELEASE ORAL DAILY
Qty: 30 CAPSULE | Refills: 0 | Status: SHIPPED | OUTPATIENT
Start: 2021-04-14 | End: 2021-05-14

## 2021-03-15 RX ORDER — DEXTROAMPHETAMINE SACCHARATE, AMPHETAMINE ASPARTATE, DEXTROAMPHETAMINE SULFATE AND AMPHETAMINE SULFATE 3.75; 3.75; 3.75; 3.75 MG/1; MG/1; MG/1; MG/1
15 TABLET ORAL DAILY
Qty: 30 TABLET | Refills: 0 | Status: SHIPPED | OUTPATIENT
Start: 2021-05-14 | End: 2021-06-07

## 2021-03-15 ASSESSMENT — ANXIETY QUESTIONNAIRES
1. FEELING NERVOUS, ANXIOUS, OR ON EDGE: NOT AT ALL
3. WORRYING TOO MUCH ABOUT DIFFERENT THINGS: SEVERAL DAYS
IF YOU CHECKED OFF ANY PROBLEMS ON THIS QUESTIONNAIRE, HOW DIFFICULT HAVE THESE PROBLEMS MADE IT FOR YOU TO DO YOUR WORK, TAKE CARE OF THINGS AT HOME, OR GET ALONG WITH OTHER PEOPLE: NOT DIFFICULT AT ALL
GAD7 TOTAL SCORE: 2
7. FEELING AFRAID AS IF SOMETHING AWFUL MIGHT HAPPEN: NOT AT ALL
2. NOT BEING ABLE TO STOP OR CONTROL WORRYING: NOT AT ALL
5. BEING SO RESTLESS THAT IT IS HARD TO SIT STILL: NOT AT ALL
6. BECOMING EASILY ANNOYED OR IRRITABLE: NOT AT ALL

## 2021-03-15 ASSESSMENT — PATIENT HEALTH QUESTIONNAIRE - PHQ9
SUM OF ALL RESPONSES TO PHQ QUESTIONS 1-9: 5
5. POOR APPETITE OR OVEREATING: SEVERAL DAYS

## 2021-03-15 NOTE — PROGRESS NOTES
"Abdias is a 45 year old who is being evaluated via a billable telephone visit.      What phone number would you like to be contacted at? [phone]  How would you like to obtain your AVS? [place]      Assessment & Plan     1. Depression, unspecified depression type    2. Attention deficit hyperactivity disorder (ADHD), unspecified ADHD type        1) PHQ and ARIEL improved. He would like to continue Zoloft 25mg and reassess in ~3 weeks.     2) ADHD under good control with current med regimen. Meds renewed, no changes.       Review of the result(s) of each unique test - PHQ, ARIEL           Return in about 6 months (around 9/15/2021) for ADHD and chornic pain follow up.    Blanche Barrera PA-C  [facility] [place]        Kimberly Calero is a 45 year old who presents for the following health issues     HPI     Medication Followup of Adderall    Taking Medication as prescribed: yes    Side Effects:  None    Medication Helping Symptoms:  yes     Depression Followup    How are you doing with your depression since your last visit? Improved since starting medication    Are you having other symptoms that might be associated with depression? No    Have you had a significant life event?  No     Are you feeling anxious or having panic attacks?   No    Do you have any concerns with your use of alcohol or other drugs? No    Social History     Tobacco Use     Smoking status: Former Smoker     Types: Other     Smokeless tobacco: Current User     Types: Chew     Tobacco comment: \"years\" per pt. did not know date   Substance Use Topics     Alcohol use: No     Alcohol/week: 0.0 standard drinks     Drug use: No     PHQ 4/1/2019 3/10/2021 3/15/2021   PHQ-9 Total Score 6 12 5   Q9: Thoughts of better off dead/self-harm past 2 weeks Not at all Several days Not at all   F/U: Thoughts of suicide or self-harm - No -   F/U: Safety concerns - No -     ARIEL-7 SCORE 4/1/2019 3/10/2021 3/15/2021   Total Score - - -   Total Score - 9 " (mild anxiety) -   Total Score 1 9 2       Suicide Assessment Five-step Evaluation and Treatment (SAFE-T)      How many servings of fruits and vegetables do you eat daily?  2-3    On average, how many sweetened beverages do you drink each day (Examples: soda, juice, sweet tea, etc.  Do NOT count diet or artificially sweetened beverages)?   0    How many days per week do you exercise enough to make your heart beat faster? 7    How many minutes a day do you exercise enough to make your heart beat faster? 20 - 29    How many days per week do you miss taking your medication? 0      Review of Systems   Constitutional, and psych systems are negative, except as otherwise noted.      Objective           Vitals:  No vitals were obtained today due to virtual visit.    Physical Exam   healthy, alert and no distress  PSYCH: Alert and oriented times 3; coherent speech, normal   rate and volume, able to articulate logical thoughts, able   to abstract reason, no tangential thoughts, no hallucinations   or delusions  His affect is normal and pleasant  RESP: No cough, no audible wheezing, able to talk in full sentences  Remainder of exam unable to be completed due to telephone visits          Phone call duration: 9 minutes

## 2021-03-16 ASSESSMENT — ANXIETY QUESTIONNAIRES: GAD7 TOTAL SCORE: 2

## 2021-03-18 ENCOUNTER — TELEPHONE (OUTPATIENT)
Dept: FAMILY MEDICINE | Facility: CLINIC | Age: 46
End: 2021-03-18

## 2021-03-18 ENCOUNTER — MYC MEDICAL ADVICE (OUTPATIENT)
Dept: FAMILY MEDICINE | Facility: CLINIC | Age: 46
End: 2021-03-18

## 2021-03-19 ENCOUNTER — MYC MEDICAL ADVICE (OUTPATIENT)
Dept: FAMILY MEDICINE | Facility: CLINIC | Age: 46
End: 2021-03-19

## 2021-04-11 ENCOUNTER — HEALTH MAINTENANCE LETTER (OUTPATIENT)
Age: 46
End: 2021-04-11

## 2021-04-28 ENCOUNTER — MYC MEDICAL ADVICE (OUTPATIENT)
Dept: FAMILY MEDICINE | Facility: CLINIC | Age: 46
End: 2021-04-28

## 2021-04-28 DIAGNOSIS — G47.09 OTHER INSOMNIA: ICD-10-CM

## 2021-04-28 NOTE — TELEPHONE ENCOUNTER
Routing refill request to provider for review/approval because:  Drug not on the [facility] refill protocol.  Last Written Prescription Date:  12/2020  Last Fill Quantity: 90,  # refills: 1   Last office visit: 8/19/2020 and 3/15/21 virtual  with prescribing provider:    Future Office Visit:

## 2021-04-28 NOTE — TELEPHONE ENCOUNTER
[employer] message sent to patient.    Sandra BSN-RN  Triage Nurse  [facility]: [facility]

## 2021-04-30 RX ORDER — ZOLPIDEM TARTRATE 10 MG/1
10 TABLET ORAL
Qty: 90 TABLET | Refills: 1 | Status: CANCELLED | OUTPATIENT
Start: 2021-04-30

## 2021-05-03 ENCOUNTER — MYC MEDICAL ADVICE (OUTPATIENT)
Dept: FAMILY MEDICINE | Facility: CLINIC | Age: 46
End: 2021-05-03

## 2021-05-03 RX ORDER — ZOLPIDEM TARTRATE 10 MG/1
10 TABLET ORAL
Qty: 90 TABLET | Refills: 1 | Status: SHIPPED | OUTPATIENT
Start: 2021-06-17 | End: 2021-05-04

## 2021-05-03 NOTE — TELEPHONE ENCOUNTER
[employer] message sent to patient with the information below per PCP.  Sandra BSN-RN  Triage Nurse  [facility]: [facility]

## 2021-06-15 ENCOUNTER — MYC MEDICAL ADVICE (OUTPATIENT)
Dept: FAMILY MEDICINE | Facility: CLINIC | Age: 46
End: 2021-06-15

## 2021-06-15 DIAGNOSIS — K21.9 GASTROESOPHAGEAL REFLUX DISEASE, UNSPECIFIED WHETHER ESOPHAGITIS PRESENT: ICD-10-CM

## 2021-06-15 NOTE — TELEPHONE ENCOUNTER
Routing refill request to provider for review/approval because:  Needs provider approval. Due for appt

## 2021-06-16 ENCOUNTER — MYC MEDICAL ADVICE (OUTPATIENT)
Dept: FAMILY MEDICINE | Facility: CLINIC | Age: 46
End: 2021-06-16

## 2021-06-21 ENCOUNTER — MYC MEDICAL ADVICE (OUTPATIENT)
Dept: FAMILY MEDICINE | Facility: CLINIC | Age: 46
End: 2021-06-21

## 2021-06-25 ENCOUNTER — MYC REFILL (OUTPATIENT)
Dept: FAMILY MEDICINE | Facility: CLINIC | Age: 46
End: 2021-06-25

## 2021-06-25 DIAGNOSIS — G89.4 CHRONIC PAIN SYNDROME: Chronic | ICD-10-CM

## 2021-06-25 DIAGNOSIS — M62.830 BACK MUSCLE SPASM: ICD-10-CM

## 2021-06-25 DIAGNOSIS — M54.50 CHRONIC BILATERAL LOW BACK PAIN, UNSPECIFIED WHETHER SCIATICA PRESENT: ICD-10-CM

## 2021-06-25 DIAGNOSIS — G89.29 CHRONIC BILATERAL LOW BACK PAIN, UNSPECIFIED WHETHER SCIATICA PRESENT: ICD-10-CM

## 2021-06-25 DIAGNOSIS — M53.3 SI (SACROILIAC) JOINT DYSFUNCTION: ICD-10-CM

## 2021-06-25 NOTE — TELEPHONE ENCOUNTER
Requested Prescriptions   Pending Prescriptions Disp Refills     traMADol (ULTRAM) 50 MG tablet 120 tablet 0     Sig: Take 1-2 tablets ( mg) by mouth every 6 hours as needed for moderate pain (max of 4 per day.) - This should last 1 month or more       There is no refill protocol information for this order        Last Written Prescription Date:  2/1/21  Last Fill Quantity: 120,  # refills: 0   Last office visit: 3/15/21 with prescribing provider    Routing refill request to provider for review/approval because:  Drug not on the [facility] refill protocol   Sandra RAMIREZN-RN  Triage Nurse  [facility]: [facility]

## 2021-06-25 NOTE — TELEPHONE ENCOUNTER
Requested Prescriptions   Pending Prescriptions Disp Refills     methocarbamol (ROBAXIN) 500 MG tablet 30 tablet 0     Sig: Take 1-2 tablets (500-1,000 mg) by mouth 3 times daily as needed for muscle spasms       There is no refill protocol information for this order        Last Written Prescription Date:  5/25/21  Last Fill Quantity: 30,  # refills: 0   Last office visit: 3/15/21 with prescribing provider      Routing refill request to provider for review/approval because:  Drug not on the [facility] refill protocol   Sandra RAMIREZN-RN  Triage Nurse  [facility]: [facility]

## 2021-06-28 RX ORDER — TRAMADOL HYDROCHLORIDE 50 MG/1
50-100 TABLET ORAL EVERY 6 HOURS PRN
Qty: 120 TABLET | Refills: 0 | Status: SHIPPED | OUTPATIENT
Start: 2021-06-28 | End: 2021-11-15

## 2021-06-28 RX ORDER — METHOCARBAMOL 500 MG/1
500-1000 TABLET, FILM COATED ORAL 3 TIMES DAILY PRN
Qty: 30 TABLET | Refills: 0 | Status: SHIPPED | OUTPATIENT
Start: 2021-06-28 | End: 2021-09-27

## 2021-09-06 ENCOUNTER — MYC REFILL (OUTPATIENT)
Dept: FAMILY MEDICINE | Facility: CLINIC | Age: 46
End: 2021-09-06

## 2021-09-06 DIAGNOSIS — F90.9 ATTENTION DEFICIT HYPERACTIVITY DISORDER (ADHD), UNSPECIFIED ADHD TYPE: ICD-10-CM

## 2021-09-09 NOTE — TELEPHONE ENCOUNTER
Routing refill request to provider for review/approval because:  Drug not on the [facility] refill protocol.  Adderrall XR 20 mg.  Last Written Prescription Date:  8/12/21  Last Fill Quantity: 30,  # refills: 0   Last office visit: 8/19/2020 and 3/15/21 virtual with prescribing provider:  Future Office Visit:   Next 5 appointments (look out 90 days)    Sep 27, 2021  1:00 PM  MyChart Short with ROGELIO Maldonado [facility] [place] ([facility] ) [address]  [place] [address]  [phone]         Adderall 15 mg  Last Written Prescription Date:  8/12/21  Last Fill Quantity: 30,  # refills: 0   Last office visit: 8/19/2020 and 3/15/21 virtual visit with prescribing provider:   Future Office Visit:   Next 5 appointments (look out 90 days)    Sep 27, 2021  1:00 PM  MyChart Robin with Blanche Barrera PA-C  [facility] [place] ([facility] ) [address]  [place] [address]  [phone]

## 2021-09-10 RX ORDER — DEXTROAMPHETAMINE SACCHARATE, AMPHETAMINE ASPARTATE MONOHYDRATE, DEXTROAMPHETAMINE SULFATE AND AMPHETAMINE SULFATE 5; 5; 5; 5 MG/1; MG/1; MG/1; MG/1
20 CAPSULE, EXTENDED RELEASE ORAL DAILY
Qty: 30 CAPSULE | Refills: 0 | Status: SHIPPED | OUTPATIENT
Start: 2021-09-11 | End: 2022-01-17

## 2021-09-10 RX ORDER — DEXTROAMPHETAMINE SACCHARATE, AMPHETAMINE ASPARTATE, DEXTROAMPHETAMINE SULFATE AND AMPHETAMINE SULFATE 3.75; 3.75; 3.75; 3.75 MG/1; MG/1; MG/1; MG/1
15 TABLET ORAL DAILY
Qty: 30 TABLET | Refills: 0 | Status: SHIPPED | OUTPATIENT
Start: 2021-09-11 | End: 2021-09-27

## 2021-09-14 ENCOUNTER — MYC MEDICAL ADVICE (OUTPATIENT)
Dept: FAMILY MEDICINE | Facility: CLINIC | Age: 46
End: 2021-09-14

## 2021-09-14 DIAGNOSIS — G47.09 OTHER INSOMNIA: ICD-10-CM

## 2021-09-15 RX ORDER — ZOLPIDEM TARTRATE 10 MG/1
TABLET ORAL
Qty: 30 TABLET | Refills: 2 | Status: SHIPPED | OUTPATIENT
Start: 2021-09-22 | End: 2021-12-10

## 2021-09-15 RX ORDER — ZOLPIDEM TARTRATE 10 MG/1
TABLET ORAL
Qty: 30 TABLET | Refills: 2 | Status: SHIPPED | OUTPATIENT
Start: 2021-09-22 | End: 2021-09-15

## 2021-09-15 RX ORDER — ZOLPIDEM TARTRATE 10 MG/1
TABLET ORAL
Qty: 30 TABLET | Refills: 2 | Status: CANCELLED | OUTPATIENT
Start: 2021-09-22

## 2021-09-16 NOTE — TELEPHONE ENCOUNTER
"I called [facility], spoke to pharmacist \"Roque\", verbally called in Rx for 30 tabs with 2 refills of 10 mg ambien, start date is 9/22/21 per Rx in [facility].   Roque says patient last picked up 30 tabs on 8/27 so per their policy, 9/23 is the earliest fill date.    Routed [employer] message to patient advising of Rx sent.    Jade Trevino RN  [facility]      "

## 2021-09-26 ENCOUNTER — HEALTH MAINTENANCE LETTER (OUTPATIENT)
Age: 46
End: 2021-09-26

## 2021-09-27 ENCOUNTER — OFFICE VISIT (OUTPATIENT)
Dept: FAMILY MEDICINE | Facility: CLINIC | Age: 46
End: 2021-09-27
Payer: COMMERCIAL

## 2021-09-27 VITALS
BODY MASS INDEX: 30.81 KG/M2 | OXYGEN SATURATION: 97 % | HEART RATE: 102 BPM | DIASTOLIC BLOOD PRESSURE: 85 MMHG | TEMPERATURE: 98.4 F | SYSTOLIC BLOOD PRESSURE: 131 MMHG | WEIGHT: 240 LBS | RESPIRATION RATE: 16 BRPM

## 2021-09-27 DIAGNOSIS — M54.50 CHRONIC BILATERAL LOW BACK PAIN, UNSPECIFIED WHETHER SCIATICA PRESENT: Chronic | ICD-10-CM

## 2021-09-27 DIAGNOSIS — F90.9 ATTENTION DEFICIT HYPERACTIVITY DISORDER (ADHD), UNSPECIFIED ADHD TYPE: Primary | ICD-10-CM

## 2021-09-27 DIAGNOSIS — M62.830 BACK MUSCLE SPASM: ICD-10-CM

## 2021-09-27 DIAGNOSIS — G89.29 CHRONIC BILATERAL LOW BACK PAIN, UNSPECIFIED WHETHER SCIATICA PRESENT: Chronic | ICD-10-CM

## 2021-09-27 DIAGNOSIS — F51.04 INSOMNIA, PSYCHOPHYSIOLOGICAL: ICD-10-CM

## 2021-09-27 DIAGNOSIS — F32.A DEPRESSION, UNSPECIFIED DEPRESSION TYPE: ICD-10-CM

## 2021-09-27 LAB
AMPHETAMINES UR QL: DETECTED
BARBITURATES UR QL SCN: NOT DETECTED
BENZODIAZ UR QL SCN: NOT DETECTED
BUPRENORPHINE UR QL: NOT DETECTED
CANNABINOIDS UR QL: NOT DETECTED
COCAINE UR QL SCN: NOT DETECTED
D-METHAMPHET UR QL: NOT DETECTED
METHADONE UR QL SCN: NOT DETECTED
OPIATES UR QL SCN: NOT DETECTED
OXYCODONE UR QL SCN: NOT DETECTED
PCP UR QL SCN: NOT DETECTED
PROPOXYPH UR QL: NOT DETECTED
TRICYCLICS UR QL SCN: NOT DETECTED

## 2021-09-27 PROCEDURE — 96127 BRIEF EMOTIONAL/BEHAV ASSMT: CPT | Mod: 59 | Performed by: PHYSICIAN ASSISTANT

## 2021-09-27 PROCEDURE — 84403 ASSAY OF TOTAL TESTOSTERONE: CPT | Performed by: PHYSICIAN ASSISTANT

## 2021-09-27 PROCEDURE — 80306 DRUG TEST PRSMV INSTRMNT: CPT | Performed by: PHYSICIAN ASSISTANT

## 2021-09-27 PROCEDURE — 99214 OFFICE O/P EST MOD 30 MIN: CPT | Mod: 25 | Performed by: PHYSICIAN ASSISTANT

## 2021-09-27 PROCEDURE — 90686 IIV4 VACC NO PRSV 0.5 ML IM: CPT | Performed by: PHYSICIAN ASSISTANT

## 2021-09-27 PROCEDURE — 90471 IMMUNIZATION ADMIN: CPT | Performed by: PHYSICIAN ASSISTANT

## 2021-09-27 PROCEDURE — 36415 COLL VENOUS BLD VENIPUNCTURE: CPT | Performed by: PHYSICIAN ASSISTANT

## 2021-09-27 PROCEDURE — 84270 ASSAY OF SEX HORMONE GLOBUL: CPT | Performed by: PHYSICIAN ASSISTANT

## 2021-09-27 RX ORDER — METHOCARBAMOL 500 MG/1
500-1000 TABLET, FILM COATED ORAL 3 TIMES DAILY PRN
Qty: 30 TABLET | Refills: 2 | Status: SHIPPED | OUTPATIENT
Start: 2021-09-27 | End: 2022-02-09

## 2021-09-27 RX ORDER — DEXTROAMPHETAMINE SACCHARATE, AMPHETAMINE ASPARTATE MONOHYDRATE, DEXTROAMPHETAMINE SULFATE AND AMPHETAMINE SULFATE 3.75; 3.75; 3.75; 3.75 MG/1; MG/1; MG/1; MG/1
15 CAPSULE, EXTENDED RELEASE ORAL DAILY
Qty: 30 CAPSULE | Refills: 0 | Status: SHIPPED | OUTPATIENT
Start: 2021-09-27 | End: 2021-10-22

## 2021-09-27 RX ORDER — DEXTROAMPHETAMINE SACCHARATE, AMPHETAMINE ASPARTATE, DEXTROAMPHETAMINE SULFATE AND AMPHETAMINE SULFATE 5; 5; 5; 5 MG/1; MG/1; MG/1; MG/1
20 TABLET ORAL DAILY
Qty: 30 TABLET | Refills: 0 | Status: SHIPPED | OUTPATIENT
Start: 2021-09-27 | End: 2021-10-22

## 2021-09-27 ASSESSMENT — PATIENT HEALTH QUESTIONNAIRE - PHQ9
5. POOR APPETITE OR OVEREATING: SEVERAL DAYS
SUM OF ALL RESPONSES TO PHQ QUESTIONS 1-9: 3

## 2021-09-27 ASSESSMENT — ANXIETY QUESTIONNAIRES
5. BEING SO RESTLESS THAT IT IS HARD TO SIT STILL: NOT AT ALL
GAD7 TOTAL SCORE: 1
7. FEELING AFRAID AS IF SOMETHING AWFUL MIGHT HAPPEN: NOT AT ALL
3. WORRYING TOO MUCH ABOUT DIFFERENT THINGS: NOT AT ALL
IF YOU CHECKED OFF ANY PROBLEMS ON THIS QUESTIONNAIRE, HOW DIFFICULT HAVE THESE PROBLEMS MADE IT FOR YOU TO DO YOUR WORK, TAKE CARE OF THINGS AT HOME, OR GET ALONG WITH OTHER PEOPLE: NOT DIFFICULT AT ALL
1. FEELING NERVOUS, ANXIOUS, OR ON EDGE: NOT AT ALL
2. NOT BEING ABLE TO STOP OR CONTROL WORRYING: NOT AT ALL
6. BECOMING EASILY ANNOYED OR IRRITABLE: NOT AT ALL

## 2021-09-27 NOTE — PATIENT INSTRUCTIONS
Write down the time you take the IR in the morning.  Write down the time you feel as though it wears off.   Write down the time you take the XR.    If things are going well send me a Mychart FYI in ~3 weeks.   If we need to make any changes let's do an e-visit or virtual visit.

## 2021-09-27 NOTE — PROGRESS NOTES
Assessment & Plan       ICD-10-CM    1. Attention deficit hyperactivity disorder (ADHD), unspecified ADHD type  F90.9 Drug Abuse Screen Panel 13, Urine (Pain Care Package) - lab collect     amphetamine-dextroamphetamine (ADDERALL) 20 MG tablet     amphetamine-dextroamphetamine (ADDERALL XR) 15 MG 24 hr capsule     Drug Abuse Screen Panel 13, Urine (Pain Care Package) - lab collect   2. Back muscle spasm  M62.830 methocarbamol (ROBAXIN) 500 MG tablet   3. Depression, unspecified depression type  F32.9 Testosterone Free and Total     Testosterone Free and Total   4. Chronic bilateral low back pain, unspecified whether sciatica present  M54.5     G89.29    5. Insomnia, psychophysiological  F51.04          1) Increase IR to 20mg in the morning and decrease XR to 15mg in the afternoon. Routine UDS today. He'll let me know how things are going in ~3 weeks.     2) Med renewed, no change    3) Patient has requested his testosterone be checked.     4,5) Meds renewed, no changes. Conditions under good control.      Ordering of each unique test  Prescription drug management         Return in about 6 months (around 3/27/2022) for your annual physical, fasting labs, med check/refills.    Blanche Barrera PA-C  [facility] [place] Calero is a 45 year old who presents for the following health issues     HPI     Medication Followup of amphetamine-dextroamphetamine (ADDERALL XR) 20 MG 24 hr capsule and (adderall) 15 mg  Attention deficit hyperactivity disorder (ADHD), unspecified ADHD type       Taking Medication as prescribed: yes    Side Effects:  None    Medication Helping Symptoms:  Yes    Takes IR at 2:15am,  Takes XR around 8:30-9, lasts until 5:30-6 per wife  Focus is under good control   Gets done with work at the latest 12:30pm    I'd like to make a few changes on the ADHD medications.  Morning pill dose around 2:00 am (fast release 15 mg) I'd like to change to 20 mg, then my dose around  10:00-10:30 am (slow release 20 mg) I'll like to drop to 10 mg. 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 I think the change drops the daily amount too.       Chronic pain follow up   Uses tramadol as neded    Sleep follow up  Uses Ambien nightly      Review of Systems   Constitutional, musculoskeletal, and psych systems are negative, except as otherwise noted.      Objective    /85   Pulse 102   Temp 98.4  F (36.9  C) (Tympanic)   Resp 16   Wt 108.9 kg (240 lb)   SpO2 97%   BMI 30.81 kg/m    Body mass index is 30.81 kg/m .  Physical Exam   GENERAL: healthy, alert and no distress  MS: no gross musculoskeletal defects noted, no edema  SKIN: no suspicious lesions or rashes  NEURO: Normal strength and tone, mentation intact and speech normal  PSYCH: mentation appears normal, affect normal/bright

## 2021-09-28 LAB — SHBG SERPL-SCNC: 22 NMOL/L (ref 11–80)

## 2021-09-28 ASSESSMENT — ANXIETY QUESTIONNAIRES: GAD7 TOTAL SCORE: 1

## 2021-09-29 LAB
SHBG SERPL-SCNC: 22 NMOL/L (ref 11–80)
TESTOST FREE SERPL-MCNC: 7.54 NG/DL
TESTOST SERPL-MCNC: 308 NG/DL (ref 240–950)

## 2021-11-14 ENCOUNTER — MYC MEDICAL ADVICE (OUTPATIENT)
Dept: PALLIATIVE MEDICINE | Facility: CLINIC | Age: 46
End: 2021-11-14
Payer: COMMERCIAL

## 2021-11-14 DIAGNOSIS — M53.3 SI (SACROILIAC) JOINT DYSFUNCTION: Primary | ICD-10-CM

## 2021-11-15 NOTE — TELEPHONE ENCOUNTER
Blanche, I havent' seen Abdias in a long time (2019)  Any concerns with repeating SI joint injections?  Would you mind signing below?  We had been doing it consistently so I had just signed, but now he really hasn't been seeing me here.    Radha Marte MD  [facility]

## 2021-11-15 NOTE — TELEPHONE ENCOUNTER
06/20/19 Procedure performed: bilateral SI joint injections. Order pended for review.     From: Abdias Galicia      Created: 11/14/2021 2:34 AM        It s been a while with Covid and such. Can I get my injections ordered to start the process?     Thanks        ASHLEY AndersonN, RN  Care Coordinator  [facility] [place]

## 2021-11-16 NOTE — TELEPHONE ENCOUNTER
Abdias,  Since I haven't seen you in so long, I had Blanchekuldeep Garciaaeble sign the orders.  So you should be called to schedule soon.     Radha Marte MD  [facility]

## 2021-12-09 ENCOUNTER — MYC MEDICAL ADVICE (OUTPATIENT)
Dept: FAMILY MEDICINE | Facility: CLINIC | Age: 46
End: 2021-12-09
Payer: COMMERCIAL

## 2021-12-12 ENCOUNTER — MYC MEDICAL ADVICE (OUTPATIENT)
Dept: FAMILY MEDICINE | Facility: CLINIC | Age: 46
End: 2021-12-12
Payer: COMMERCIAL

## 2021-12-17 ENCOUNTER — MYC MEDICAL ADVICE (OUTPATIENT)
Dept: FAMILY MEDICINE | Facility: CLINIC | Age: 46
End: 2021-12-17

## 2021-12-17 ENCOUNTER — VIRTUAL VISIT (OUTPATIENT)
Dept: FAMILY MEDICINE | Facility: CLINIC | Age: 46
End: 2021-12-17
Payer: COMMERCIAL

## 2021-12-17 DIAGNOSIS — Z11.52 ENCOUNTER FOR SCREENING FOR COVID-19: ICD-10-CM

## 2021-12-17 DIAGNOSIS — M53.3 SI (SACROILIAC) JOINT DYSFUNCTION: ICD-10-CM

## 2021-12-17 DIAGNOSIS — M54.50 CHRONIC BILATERAL LOW BACK PAIN, UNSPECIFIED WHETHER SCIATICA PRESENT: Primary | Chronic | ICD-10-CM

## 2021-12-17 DIAGNOSIS — G89.29 CHRONIC BILATERAL LOW BACK PAIN, UNSPECIFIED WHETHER SCIATICA PRESENT: Primary | Chronic | ICD-10-CM

## 2021-12-17 PROCEDURE — 99213 OFFICE O/P EST LOW 20 MIN: CPT | Mod: TEL | Performed by: PHYSICIAN ASSISTANT

## 2021-12-17 NOTE — PROGRESS NOTES
Abdias is a 46 year old who is being evaluated via a billable telephone visit.      What phone number would you like to be contacted at? [phone]  How would you like to obtain your AVS? Trevor    Assessment & Plan     1. Chronic bilateral low back pain, unspecified whether sciatica present    2. SI (sacroiliac) joint dysfunction    3. Encounter for screening for COVID-19        1,2) Paperwork filled out, will send copy to scanning. No changes to the previous [facility] paperwork we've done in the past. He also follows up with pain management PRN.      Return in about 3 months (around 3/17/2022) for ADHD follow up.    ROGELIO Maldonado [facility] ENRICO Harris   Abdias is a 46 year old who presents for the following health issues     Chief Complaint   Patient presents with     Forms     [place] paperwork       History of Present Illness       Back Pain:  He presents for follow up of back pain. Patient's back pain is a chronic problem.  Location of back pain:  Right lower back, left lower back, right buttock and left buttock  Description of back pain: cramping, dull ache and shooting  Back pain spreads: right buttocks and left buttocks    Since patient first noticed back pain, pain is: always present, but gets better and worse  Does back pain interfere with his job:  Yes      He eats 0-1 servings of fruits and vegetables daily.He consumes 2 sweetened beverage(s) daily.He exercises with enough effort to increase his heart rate 9 or less minutes per day.  He exercises with enough effort to increase his heart rate 3 or less days per week.   He is taking medications regularly.     Up to 3 times per month, 1-2 days per episode is still accurate for time off he needs for flares   Still has orders for PRN injections with pain management    8/19/20:  FMLA filled out. Updated to 3 episodes per month, 1-2 days per episode. Does not feel a repeat injection is needed at this time, but will follow up with  pain management if needed.       Review of Systems   Constitutional, musculoskeletal systems are negative, except as otherwise noted.      Objective           Vitals:  No vitals were obtained today due to virtual visit.    Physical Exam   healthy, alert and no distress  PSYCH: Alert and oriented times 3; coherent speech, normal   rate and volume, able to articulate logical thoughts, able   to abstract reason, no tangential thoughts, no hallucinations   or delusions  His affect is normal and pleasant  RESP: No cough, no audible wheezing, able to talk in full sentences  Remainder of exam unable to be completed due to telephone visits          Phone call duration: 11 minutes

## 2022-01-21 ENCOUNTER — TELEPHONE (OUTPATIENT)
Dept: PALLIATIVE MEDICINE | Facility: CLINIC | Age: 47
End: 2022-01-21
Payer: COMMERCIAL

## 2022-01-21 NOTE — TELEPHONE ENCOUNTER
Screening Questions for Radiology Injections:    Injection to be done at which interventional clinic site? [facility] [place]    Remind [place] Pt's that Covid test is no longer required except for sedation procedure Pt's.    Instruct patient to arrive as directed prior to the scheduled appointment time:    [facility]      [place]: 30 minutes before; if IV needed 1 hour before     Procedure ordered by Rosanna    Procedure ordered? bilateral SI joint injections       Transforaminal Cervical JAIME -  [place] does NOT have providers that do these- [facility] [facility] do have providers that do    As a reminder, receiving steroids can decrease your body's ability to fight infection.   Would you still like to move forward with scheduling the injection?  Yes    What insurance would patient like us to bill for this procedure? Preferred/Aetna      Worker's comp or MVA (motor vehicle accident) -Any injection DO NOT SCHEDULE and route to Tami Mccord.      [employer] insurance - For SI joint injections, DO NOT SCHEDULE and route Tami Mccord.       ALL BCBS, [employer] and [employer] CIGNA-Route to Tami for review DO NOT SCHEDULE      IF SCHEDULING IN [place] AND NEEDS A PA, IT IS OKAY TO SCHEDULE. [place] HANDLES THEIR OWN PA'S AFTER THE PATIENT IS SCHEDULED. PLEASE SCHEDULE AT LEAST 1 WEEK OUT SO A PA CAN BE OBTAINED.    Any chance of pregnancy? NO   If YES, do NOT schedule and route to RN pool    Is an  needed? No     Patient has a drive home? (mandatory) YES: ok    Is patient taking any blood thinners (That is: Coumadin, Warfarin, Jantoven, Pradaxa Xarelto, Eliquis, Edoxaban, Enoxaparin, Lovenox, Heparin, Arixtra, Fondaparinux, or Fragmin? OR Antiplatelet medication such as Plavix, Brilinta, or Effient? )? No   If hold needed, do NOT schedule, route to RN pool     Is patient taking any aspirin products (includes Excedrin and Fiorinal)? No     If more than 325mg/day, OK to schedule;  Instruct pt to decrease to less than 325 mg for 7 days AND route to RN pool    For CERVICAL procedures, hold all aspirin products for 6 days.     Tell pt that if aspirin product is not held for 6 days, the procedure WILL BE cancelled.      Does the patient have a bleeding or clotting disorder? No     If YES, okay to schedule AND route to RN nurse pool    For any patients with platelet count <100, must be forwarded to provider    Any allergies to contrast dye, iodine, shellfish, or numbing and steroid medications? No    If YES, add allergy information to appointment notes AND route to the RN pool     If JAIME and Contrast Dye / Iodine Allergy? DO NOT SCHEDULE, route to RN pool    Allergies: Penicillins and Ritalin [methylphenidate]     Is patient diabetic?  No  If YES, instruct them to bring their glucometer.    Does patient have an active infection or treated for one within the past week? No     Is patient currently taking any antibiotics?  No     For patients on chronic, preventative, or prophylactic antibiotics, procedures may be scheduled.     For patients on antibiotics for active or recent infection:antibiotic course must have been completed for 4 days    Is patient currently taking any steroid medications? (i.e. Prednisone, Medrol)  No     For patients on steroid medications, course must have been completed for 4 days    Is patient actively being treated for cancer or immunocompromised? No  If YES, do NOT schedule and route to RN pool     Are you able to get on and off an exam table with minimal or no assistance? Yes  If NO, do NOT schedule and route to RN pool    Are you able to roll over and lay on your stomach with minimal or no assistance? Yes  If NO, do NOT schedule and route to RN pool     Has the patient had a flu shot or any other vaccinations within 7 days before or after the procedure.  No     Have you recently had a COVID vaccine or have plans to get it in the near future? No    If yes, explain that  for the vaccine to work best they need to:       wait 1 week before and 1 week after getting Vaccine #1    wait 1 week before and 2 weeks after getting Vaccine #2    wait 1 week before and 2 weeks after getting Vaccine BOOSTER    If patient has concerns about the timing, send to RN pool     Does patient have an MRI/CT?  Not Applicable  Check Procedure Scheduling Grid to see if required.      Was the MRI done within the last 3 years?  NA    If yes, where was the MRI done i.e.[facility], [facility], [place], [facility] etc?       If no, do not schedule and route to RN pool    If MRI was not done at [place], [facility] or [facility] do NOT schedule and route to RN pool.      If pt has an imaging disc, the injection MAY be scheduled but pt has to bring disc to appt.     If they show up without the disc the injection cannot be done    Procedure Specific Instructions:      If celiac plexus block, informed patient NPO for 6 hours and that it is okay to take medications with sips of water, especially blood pressure medications  Not Applicable         If this is for a cervical procedure, informed patient that aspirin needs to be held for 6 days.   Not Applicable      If IV needed:    Do not schedule procedures requiring IV placement in the first appointment of the day or first appointment after lunch. Do NOT schedule at 0745, 0815 or 1245. ok    Instructed pt to arrive 30 minutes early for IV start if required. (Check Procedure Scheduling Grid)  Not Applicable    Reminders:      If you are started on any steroids or antibiotics between now and your appointment, you must contact us because the procedure may need to be cancelled.  Yes      For all procedures except radiofrequency ablations (RFAs) and spinal cord stimulator (SCS) trials, informed patient:    IV sedation is not provided for this procedure.  If you feel that an oral anti-anxiety medication is needed, you can discuss this further with your referring  provider or primary care provider.  The Pain Clinic provider will discuss specifics of what the procedure includes at your appointment.  Most procedures last 10-20 minutes.  We use numbing medications to help with any discomfort during the procedure.  Not Applicable      For patients 85 or older we recommend having an adult stay w/ them for the remainder of the day.   ok    Does the patient have any questions?  NO  Alize Grimes  [facility]

## 2022-02-02 ENCOUNTER — RADIOLOGY INJECTION OFFICE VISIT (OUTPATIENT)
Dept: PALLIATIVE MEDICINE | Facility: CLINIC | Age: 47
End: 2022-02-02
Attending: PHYSICIAN ASSISTANT
Payer: COMMERCIAL

## 2022-02-02 VITALS — HEART RATE: 105 BPM | DIASTOLIC BLOOD PRESSURE: 99 MMHG | SYSTOLIC BLOOD PRESSURE: 150 MMHG | OXYGEN SATURATION: 98 %

## 2022-02-02 DIAGNOSIS — M53.3 SI (SACROILIAC) JOINT DYSFUNCTION: Primary | ICD-10-CM

## 2022-02-02 PROCEDURE — 27096 INJECT SACROILIAC JOINT: CPT | Mod: 50 | Performed by: PSYCHIATRY & NEUROLOGY

## 2022-02-02 RX ORDER — TRIAMCINOLONE ACETONIDE 40 MG/ML
40 INJECTION, SUSPENSION INTRA-ARTICULAR; INTRAMUSCULAR ONCE
Status: COMPLETED | OUTPATIENT
Start: 2022-02-02 | End: 2022-02-02

## 2022-02-02 RX ADMIN — TRIAMCINOLONE ACETONIDE 40 MG: 40 INJECTION, SUSPENSION INTRA-ARTICULAR; INTRAMUSCULAR at 09:23

## 2022-02-02 ASSESSMENT — PAIN SCALES - GENERAL: PAINLEVEL: WORST PAIN (10)

## 2022-02-02 NOTE — PATIENT INSTRUCTIONS
[facility]   Procedure Discharge Instructions    Today you saw: Dr. Love Marte    You had an:    sacroiliac joint injection         Be cautious when walking. Numbness and/or weakness in the lower extremities may occur for up to 6-8 hours after the procedure due to effect of the local anesthetic    Do not drive for 6 hours. The effect of the local anesthetic could slow your reflexes.     You may resume your regular activities after 24 hours    Avoid strenuous activity for the first 24 hours    You may shower, however avoid swimming, tub baths or hot tubs for 24 hours following your procedure    You may have a mild to moderate increase in pain for several days following the injection.    It may take up to 14 days for the steroid medication to start working although you may feel the effect as early as a few days after the procedure.       You may use ice packs for 10-15 minutes, 3 to 4 times a day at the injection site for comfort    Do not use heat to painful areas for 6 to 8 hours. This will give the local anesthetic time to wear off and prevent you from accidentally burning your skin.     Unless you have been directed to avoid the use of anti-inflammatory medications (NSAIDS), you may use medications such as ibuprofen, Aleve or Tylenol for pain control if needed.     If you were fasting, you may resume your normal diet and medications after the procedure    If you have diabetes, check your blood sugar more frequently than usual as your blood sugar may be higher than normal for 10-14 days following a steroid injection. Contact your doctor who manages your diabetes if your blood sugar is higher than usual    Possible side effects of steroids that you may experience include flushing, elevated blood pressure, increased appetite, mild headaches and restlessness.  All of these symptoms will get better with time.    If you experience any of the following, call the Pain Clinic during work  hours (Mon-Friday 8-4:30 pm) at [phone] or the Provider Line after hours at [phone]:  -Fever over 100 degree F  -Swelling, bleeding, redness, drainage, warmth at the injection site  -Progressive weakness or numbness in your legs or arms  -Loss of bowel or bladder function  -Unusual headache that is not relieved by Tylenol or other pain reliever  -Unusual new onset of pain that is not improving

## 2022-02-02 NOTE — NURSING NOTE
Pre-procedure Intake  If YES to any questions or NO to having a   Please complete laminated checklist and leave on the computer keyboard for Provider, verbally inform provider if able.    For SCS Trial, RFA's or any sedation procedure:  Have you been fasting? NA    If yes, for how long?     Are you taking any any blood thinners such as Coumadin, Warfarin, Jantoven, Pradaxa Xarelto, Eliquis, Edoxaban, Enoxaparin, Lovenox, Heparin, Arixtra, Fondaparinux, or Fragmin? OR Antiplatelet medication such as Plavix, Brilinta, or Effient?   No     If yes, when did you take your last dose?     Do you take aspirin?  No    If cervical procedure, have you held aspirin for 6 days?   NA    Do you have any allergies to contrast dye, iodine, steroid and/or numbing medications?  NO    Are you currently taking antibiotics or have an active infection?  NO    Have you had a fever/elevated temperature within the past week? NO    Are you currently taking oral steroids? NO    Do you have a ? Yes    Are you pregnant or breastfeeding?  Not Applicable    Have you received the COVID-19 vaccine? Yes    If yes, was it your 1st, 2nd or only dose needed? 2nd dose+ booster    Date of most recent vaccine: 12/22/2021    Notify provider and RNs if systolic BP >170, diastolic BP >100, P >100 or O2 sats < 90%    Cee Michele MA  [facility]

## 2022-02-02 NOTE — PROGRESS NOTES
Pre procedure Diagnosis: SI joint dysfunction    Post procedure Diagnosis: Same  Procedure performed: bilateral SI joint injections  Anesthesia: none  Complications: none  Operators: Radha Marte MD    Indications:   Duog Galicia is a 46 year old male seen by me for injections, sent by ANNE Mcghee. They have a history of low back and buttock pain, with very good success from previous injections.  Exam shows bilateral SI joint tenderness and they have tried conservative treatment including medications, physical therapy, home-exercise, and previous injections.    Options/alternatives, benefits and risks were discussed with the patient including bleeding, infection, no pain relief, tissue trauma, exposure to radiation, reaction to medications including seizure, spinal cord injury, dural puncture, weakness, numbness and headache.  Questions were answered to his satisfaction and he agrees to proceed. Voluntary informed consent was obtained and signed.     Vitals were reviewed: Yes  Allergies were reviewed:  Yes   Medications were reviewed:  Yes   Pre-procedure pain score: 10/10    Procedure:  After getting informed consent, patient was brought into the procedure suite and was placed in a prone position on the procedure table.   A Pause for the Cause was performed.  Patient was prepped and draped in sterile fashion.     After identifying the bilateral SI joint, the C-arm was rotated to a obliquely to obtain the best view of the inferior angle of the joint.  A total of 3 ml of Lidocaine 1%  was used to anesthetize the skin at a skin entry site coaxial with the fluoroscopy beam at this location.  A 22gauge 3.5 inch needle was advanced under intermittent fluoroscopy until it was felt to enter the SI joint    A total of 3ml of Omnipaque-300 was injected, confirming appropriate position, with spread into the intraarticular space, with no intravascular uptake noted.  7ml was wasted. Location was verified in lateral  view.    3 ml of 0.2% ropivacaine with 80mg of kenalog was injected, divided between the two sides.  The needle was flushed with lidocaine and removed.    During the procedure, there was not paresthesia.     Hemostasis was achieved, the area was cleaned, and bandaids were placed when appropriate.  The patient tolerated the procedure well, and was taken to the recovery room.    Images were saved to PACS.    Post-procedure pain score: 0/10  Follow-up includes:   -f/u phone call in one week  -f/u with Dr. Rosanna Marte MD  [facility]

## 2022-02-16 ENCOUNTER — MYC REFILL (OUTPATIENT)
Dept: FAMILY MEDICINE | Facility: CLINIC | Age: 47
End: 2022-02-16
Payer: COMMERCIAL

## 2022-02-16 DIAGNOSIS — F90.9 ATTENTION DEFICIT HYPERACTIVITY DISORDER (ADHD), UNSPECIFIED ADHD TYPE: ICD-10-CM

## 2022-02-17 PROBLEM — G89.4 CHRONIC PAIN SYNDROME: Chronic | Status: ACTIVE | Noted: 2017-02-23

## 2022-02-17 PROBLEM — K21.9 GASTROESOPHAGEAL REFLUX DISEASE: Status: ACTIVE | Noted: 2018-08-28

## 2022-02-17 NOTE — TELEPHONE ENCOUNTER
Routing refill request to provider for review/approval because:  Drug not on the [facility] refill protocol     Requested Prescriptions   Pending Prescriptions Disp Refills    amphetamine-dextroamphetamine (ADDERALL XR) 15 MG 24 hr capsule 30 capsule 0     Sig: Take 1 capsule (15 mg) by mouth daily - Must start medication on 1/25        There is no refill protocol information for this order        amphetamine-dextroamphetamine (ADDERALL) 20 MG tablet 30 tablet 0     Sig: Take 1 tablet (20 mg) by mouth daily - Must start medication on 1/25        There is no refill protocol information for this order            Naina Birmingham RN   [facility]

## 2022-02-18 RX ORDER — DEXTROAMPHETAMINE SACCHARATE, AMPHETAMINE ASPARTATE, DEXTROAMPHETAMINE SULFATE AND AMPHETAMINE SULFATE 5; 5; 5; 5 MG/1; MG/1; MG/1; MG/1
20 TABLET ORAL DAILY
Qty: 30 TABLET | Refills: 0 | Status: SHIPPED | OUTPATIENT
Start: 2022-02-22 | End: 2022-02-25

## 2022-02-18 RX ORDER — DEXTROAMPHETAMINE SACCHARATE, AMPHETAMINE ASPARTATE MONOHYDRATE, DEXTROAMPHETAMINE SULFATE AND AMPHETAMINE SULFATE 3.75; 3.75; 3.75; 3.75 MG/1; MG/1; MG/1; MG/1
15 CAPSULE, EXTENDED RELEASE ORAL DAILY
Qty: 30 CAPSULE | Refills: 0 | Status: SHIPPED | OUTPATIENT
Start: 2022-02-22 | End: 2022-02-25

## 2022-02-23 NOTE — PROGRESS NOTES
Abdias is a 46 year old who is being evaluated via a billable telephone visit.      What phone number would you like to be contacted at? [phone]  How would you like to obtain your AVS? Trevor    Assessment & Plan     1. Attention deficit hyperactivity disorder (ADHD), unspecified ADHD type        Meds renewed, no changes. Follow up OV in 6 months.    Prescription drug management         Return in about 6 months (around 8/25/2022) for an annual office/clinic visit, a med check.    Blanche Barrera PA-C  [facility] ENRICO Harris   Abdias is a 46 year old who presents for the following health issues     HPI     Medication Followup of amphetamine-dextroamphetamine (ADDERALL)    Taking Medication as prescribed: yes    Side Effects:  None    Medication Helping Symptoms:  Yes    Med regimen is working well, doesn't feel the need to make any changes       Patient is following up on ADD/ADHD                                                                                        How often do you  Never Rarely Sometimes Often Very Often   1. Have trouble wrapping up the final details of a project, once the challenging parts have been done? x       2. Have difficulty getting things in order when you have to do a task that requires organization? x       3. Have problems remembering appointments or obligations? x       4. When you have a task that requires a lot of thought, how often do you avoid or delay getting started? x       5. Fidget or squirm with your hands or feet when you have to sit down for a long time? x       6. Feel overly active and compelled to do things, like you were driven by a motor?    x    7. Make careless mistakes when you have to work on a boring or difficult project?  x      8. Have difficulty keeping your attention when you are doing boring or repetitive work?   x     9. Have difficulty concentrating on what people say to you, even when they are speaking to you directly? x        10. Misplace or have difficulty finding things at home or at work? x       11. Distracted by activity or noise around you?  x      12. Leave your seat in meetings or other situations in which you are expected to remain seated?   x       13. Feel restless or fidgety?   x       14. Have difficulty unwinding and relaxing when you have time to yourself?  x      15. Find yourself talking too much when you are in social situations? x       16. When you're in a conversation, how often do you find yourself finishing the sentences of the people you are talking to, before they can finish them themselves? x       17. Have difficulty waiting your turn in situations when turn-taking is required? x       18. Interrupt others when they are busy? x           Review of Systems   Constitutional, and psych systems are negative, except as otherwise noted.      Objective           Vitals:  No vitals were obtained today due to virtual visit.    Physical Exam   healthy, alert and no distress  PSYCH: Alert and oriented times 3; coherent speech, normal   rate and volume, able to articulate logical thoughts, able   to abstract reason, no tangential thoughts, no hallucinations   or delusions  His affect is normal and pleasant  RESP: No cough, no audible wheezing, able to talk in full sentences  Remainder of exam unable to be completed due to telephone visits          Phone call duration: 11 minutes

## 2022-02-25 ENCOUNTER — VIRTUAL VISIT (OUTPATIENT)
Dept: FAMILY MEDICINE | Facility: CLINIC | Age: 47
End: 2022-02-25
Payer: COMMERCIAL

## 2022-02-25 DIAGNOSIS — F90.9 ATTENTION DEFICIT HYPERACTIVITY DISORDER (ADHD), UNSPECIFIED ADHD TYPE: Primary | ICD-10-CM

## 2022-02-25 PROCEDURE — 99213 OFFICE O/P EST LOW 20 MIN: CPT | Mod: 95 | Performed by: PHYSICIAN ASSISTANT

## 2022-02-25 RX ORDER — DEXTROAMPHETAMINE SACCHARATE, AMPHETAMINE ASPARTATE, DEXTROAMPHETAMINE SULFATE AND AMPHETAMINE SULFATE 5; 5; 5; 5 MG/1; MG/1; MG/1; MG/1
20 TABLET ORAL DAILY
Qty: 30 TABLET | Refills: 0 | Status: SHIPPED | OUTPATIENT
Start: 2022-04-23 | End: 2022-06-14

## 2022-02-25 RX ORDER — DEXTROAMPHETAMINE SACCHARATE, AMPHETAMINE ASPARTATE MONOHYDRATE, DEXTROAMPHETAMINE SULFATE AND AMPHETAMINE SULFATE 3.75; 3.75; 3.75; 3.75 MG/1; MG/1; MG/1; MG/1
15 CAPSULE, EXTENDED RELEASE ORAL DAILY
Qty: 30 CAPSULE | Refills: 0 | Status: SHIPPED | OUTPATIENT
Start: 2022-04-23 | End: 2022-06-14

## 2022-02-25 RX ORDER — DEXTROAMPHETAMINE SACCHARATE, AMPHETAMINE ASPARTATE, DEXTROAMPHETAMINE SULFATE AND AMPHETAMINE SULFATE 5; 5; 5; 5 MG/1; MG/1; MG/1; MG/1
20 TABLET ORAL DAILY
Qty: 30 TABLET | Refills: 0 | Status: SHIPPED | OUTPATIENT
Start: 2022-03-24 | End: 2022-06-14

## 2022-02-25 RX ORDER — DEXTROAMPHETAMINE SACCHARATE, AMPHETAMINE ASPARTATE MONOHYDRATE, DEXTROAMPHETAMINE SULFATE AND AMPHETAMINE SULFATE 3.75; 3.75; 3.75; 3.75 MG/1; MG/1; MG/1; MG/1
15 CAPSULE, EXTENDED RELEASE ORAL DAILY
Qty: 30 CAPSULE | Refills: 0 | Status: SHIPPED | OUTPATIENT
Start: 2022-05-23 | End: 2022-10-10

## 2022-02-25 RX ORDER — DEXTROAMPHETAMINE SACCHARATE, AMPHETAMINE ASPARTATE MONOHYDRATE, DEXTROAMPHETAMINE SULFATE AND AMPHETAMINE SULFATE 3.75; 3.75; 3.75; 3.75 MG/1; MG/1; MG/1; MG/1
15 CAPSULE, EXTENDED RELEASE ORAL DAILY
Qty: 30 CAPSULE | Refills: 0 | Status: SHIPPED | OUTPATIENT
Start: 2022-03-24 | End: 2022-06-14

## 2022-02-25 RX ORDER — DEXTROAMPHETAMINE SACCHARATE, AMPHETAMINE ASPARTATE, DEXTROAMPHETAMINE SULFATE AND AMPHETAMINE SULFATE 5; 5; 5; 5 MG/1; MG/1; MG/1; MG/1
20 TABLET ORAL DAILY
Qty: 30 TABLET | Refills: 0 | Status: SHIPPED | OUTPATIENT
Start: 2022-05-23 | End: 2022-10-10

## 2022-03-08 ENCOUNTER — MYC MEDICAL ADVICE (OUTPATIENT)
Dept: FAMILY MEDICINE | Facility: CLINIC | Age: 47
End: 2022-03-08
Payer: COMMERCIAL

## 2022-03-08 DIAGNOSIS — G47.09 OTHER INSOMNIA: ICD-10-CM

## 2022-03-08 RX ORDER — ZOLPIDEM TARTRATE 10 MG/1
TABLET ORAL
Qty: 30 TABLET | Refills: 5 | Status: SHIPPED | OUTPATIENT
Start: 2022-03-10 | End: 2022-08-23

## 2022-03-08 NOTE — TELEPHONE ENCOUNTER
See mychart exchanges, ambien fill date was changed, new Rx was sent to change fill date.    I called [facility] to verify they received the new Rx.  They did and will cancel the previous Rx.    Jade Trevino RN  [facility]

## 2022-03-10 ENCOUNTER — TELEPHONE (OUTPATIENT)
Dept: FAMILY MEDICINE | Facility: CLINIC | Age: 47
End: 2022-03-10
Payer: COMMERCIAL

## 2022-03-10 NOTE — TELEPHONE ENCOUNTER
Received call from Michelle with [facility].  She states that patient attempted to  prescription zolpidem (AMBIEN) 10 MG tablet today, however was unable to pick it up because it is locked until 03/12/2022.    Sig: TAKE 1 TABLET BY MOUTH ONCE NIGHTLY AS NEEDED FOR SLEEP - DO NOT TAKE WITHIN 4 HOURS OF TRAMADOL OR WITH ALCOHOL. Must start medication on 3/12    She states patient was under the impression that this could be started as soon as possible. Routing to provider to please review and advise. Thank you!    ASHLEY VillaltaN RN  [facility] [place]

## 2022-03-10 NOTE — TELEPHONE ENCOUNTER
"I called [id number] - [place], [place] - [address] and spoke with pharmacist Kymberly.     I reviewed the directives from Blanche Barrera PA-C with Kymberly and she verbalized a good understanding.     She said that patient picked up last prescription on 2/10/22, so it was not an insurance issue.     However, if he had stopped in prior to today he would not have been able to  due to the \"Start Date\" of 3/10/22.     Pharmacist was able to process the refill now and will send a text message to patient when ready for .     Rolanda Morales RN BSN  [facility]    "

## 2022-03-10 NOTE — TELEPHONE ENCOUNTER
He should be able to  the prescription today and to start it on 3/12. Please notify pharmacy it's ok to  the prescription a couple days early for convenience.

## 2022-05-08 ENCOUNTER — HEALTH MAINTENANCE LETTER (OUTPATIENT)
Age: 47
End: 2022-05-08

## 2022-05-28 DIAGNOSIS — K21.9 GASTROESOPHAGEAL REFLUX DISEASE, UNSPECIFIED WHETHER ESOPHAGITIS PRESENT: ICD-10-CM

## 2022-05-30 RX ORDER — OMEPRAZOLE 20 MG/1
TABLET, DELAYED RELEASE ORAL
Qty: 180 TABLET | Refills: 0 | Status: SHIPPED | OUTPATIENT
Start: 2022-05-30 | End: 2022-09-05

## 2022-06-14 ENCOUNTER — MYC REFILL (OUTPATIENT)
Dept: FAMILY MEDICINE | Facility: CLINIC | Age: 47
End: 2022-06-14
Payer: COMMERCIAL

## 2022-06-14 DIAGNOSIS — G89.4 CHRONIC PAIN SYNDROME: Chronic | ICD-10-CM

## 2022-06-14 DIAGNOSIS — F90.9 ATTENTION DEFICIT HYPERACTIVITY DISORDER (ADHD), UNSPECIFIED ADHD TYPE: ICD-10-CM

## 2022-06-14 DIAGNOSIS — M53.3 SI (SACROILIAC) JOINT DYSFUNCTION: ICD-10-CM

## 2022-06-14 DIAGNOSIS — M54.50 CHRONIC BILATERAL LOW BACK PAIN, UNSPECIFIED WHETHER SCIATICA PRESENT: ICD-10-CM

## 2022-06-14 DIAGNOSIS — G89.29 CHRONIC BILATERAL LOW BACK PAIN, UNSPECIFIED WHETHER SCIATICA PRESENT: ICD-10-CM

## 2022-06-14 RX ORDER — TRAMADOL HYDROCHLORIDE 50 MG/1
50-100 TABLET ORAL EVERY 6 HOURS PRN
Qty: 120 TABLET | Refills: 0 | Status: SHIPPED | OUTPATIENT
Start: 2022-06-14 | End: 2022-11-30

## 2022-06-14 RX ORDER — DEXTROAMPHETAMINE SACCHARATE, AMPHETAMINE ASPARTATE, DEXTROAMPHETAMINE SULFATE AND AMPHETAMINE SULFATE 5; 5; 5; 5 MG/1; MG/1; MG/1; MG/1
20 TABLET ORAL DAILY
Qty: 30 TABLET | Refills: 0 | Status: SHIPPED | OUTPATIENT
Start: 2022-06-22 | End: 2022-10-10

## 2022-06-14 RX ORDER — DEXTROAMPHETAMINE SACCHARATE, AMPHETAMINE ASPARTATE MONOHYDRATE, DEXTROAMPHETAMINE SULFATE AND AMPHETAMINE SULFATE 3.75; 3.75; 3.75; 3.75 MG/1; MG/1; MG/1; MG/1
15 CAPSULE, EXTENDED RELEASE ORAL DAILY
Qty: 30 CAPSULE | Refills: 0 | Status: SHIPPED | OUTPATIENT
Start: 2022-07-22 | End: 2022-08-03

## 2022-06-14 RX ORDER — DEXTROAMPHETAMINE SACCHARATE, AMPHETAMINE ASPARTATE MONOHYDRATE, DEXTROAMPHETAMINE SULFATE AND AMPHETAMINE SULFATE 3.75; 3.75; 3.75; 3.75 MG/1; MG/1; MG/1; MG/1
15 CAPSULE, EXTENDED RELEASE ORAL DAILY
Qty: 30 CAPSULE | Refills: 0 | Status: SHIPPED | OUTPATIENT
Start: 2022-06-22 | End: 2022-10-10

## 2022-06-14 RX ORDER — DEXTROAMPHETAMINE SACCHARATE, AMPHETAMINE ASPARTATE, DEXTROAMPHETAMINE SULFATE AND AMPHETAMINE SULFATE 5; 5; 5; 5 MG/1; MG/1; MG/1; MG/1
20 TABLET ORAL DAILY
Qty: 30 TABLET | Refills: 0 | Status: SHIPPED | OUTPATIENT
Start: 2022-07-22 | End: 2022-08-03

## 2022-06-14 NOTE — TELEPHONE ENCOUNTER
[facility] calling, advised that per their protocol they need additional information for Tramadol and Adderall to be prescribed in conjunction with one another. Pharmacy was provided with requested information, but has two additional questions for PRADEEP Mcghee.     What is the expected length of treatment?  What other non-opioid treatments have been tried and failed?    Naina Birmingham RN   [facility]

## 2022-06-14 NOTE — TELEPHONE ENCOUNTER
Routing refill request to provider for review/approval because:  Drug not on the [facility] refill protocol     Requested Prescriptions   Pending Prescriptions Disp Refills    traMADol (ULTRAM) 50 MG tablet 120 tablet 0     Sig: Take 1-2 tablets ( mg) by mouth every 6 hours as needed for severe pain        There is no refill protocol information for this order        amphetamine-dextroamphetamine (ADDERALL XR) 15 MG 24 hr capsule 30 capsule 0     Sig: Take 1 capsule (15 mg) by mouth daily - Must start medication on 3/26        There is no refill protocol information for this order        amphetamine-dextroamphetamine (ADDERALL) 20 MG tablet 30 tablet 0     Sig: Take 1 tablet (20 mg) by mouth daily - Must start medication on 3/26        There is no refill protocol information for this order            Naina Birmingham RN   [facility]

## 2022-06-15 NOTE — TELEPHONE ENCOUNTER
I called [id number] - [place], [place] - [address] and reviewed the directives from Lucretia Juares PA-C with pharmacist Jeffery. He verbalized a good understanding and said they would fill theTramadol today.     Rolanda Morales RN BSN  [facility]

## 2022-06-15 NOTE — TELEPHONE ENCOUNTER
Per prescription monitoring program, patient has been compliant with refills from PCP and has been on these medications for extended time period.     Tramadol used for chronic pain of SI joints and low back.     PCP will be out of the office until early next week and can follow up with pharmacy at that time. No indication per PCP documentation to delay refills.     Please notify pharmacy.     Lucretia Juares PA-C on 6/15/2022 at 10:34 AM

## 2022-06-22 ENCOUNTER — MYC MEDICAL ADVICE (OUTPATIENT)
Dept: FAMILY MEDICINE | Facility: CLINIC | Age: 47
End: 2022-06-22

## 2022-06-23 ENCOUNTER — PATIENT OUTREACH (OUTPATIENT)
Dept: FAMILY MEDICINE | Facility: CLINIC | Age: 47
End: 2022-06-23

## 2022-06-23 NOTE — TELEPHONE ENCOUNTER
Patient Quality Outreach    Patient is due for the following:   Depression  -  PHQ-9 Needed  Physical  - due now    NEXT STEPS:   Schedule a yearly physical    Type of outreach:    Sent [employer] message.      Questions for provider review:    None     Debra Salgado [facility]  Chart routed to Care Team.

## 2022-07-20 ENCOUNTER — TELEPHONE (OUTPATIENT)
Dept: PALLIATIVE MEDICINE | Facility: CLINIC | Age: 47
End: 2022-07-20

## 2022-07-20 NOTE — TELEPHONE ENCOUNTER
Screening Questions for Radiology Injections:    Injection to be done at which interventional clinic site? [facility] [place]    Procedure ordered by Holly    Procedure ordered?  repeat SI joint injections    Transforaminal Cervical JAIME - Send to [facility] ([facility]) - No [facility] Site providers perform this procedure    What insurance would patient like us to bill for this procedure? [employer]     Worker's comp or MVA (motor vehicle accident) -Any injection DO NOT SCHEDULE and route to Jhonathan Grimes.      [employer] insurance - For SI joint injections, DO NOT SCHEDULE and route to Tami Myersy.       ALL BCBS, [employer] and [employer] - DO NOT SCHEDULE and route to Tami Mccord    Is an  needed? No     Patient has a  home? (Review Grid) YES: ok    Any chance of pregnancy? NO   If YES, do NOT schedule and route to RN pool    Is patient actively being treated for cancer or immunocompromised? No  If YES, do NOT schedule and route to RN pool     Does the patient have a bleeding or clotting disorder? No     If YES, okay to schedule AND route to RN nurse pool. (For any patients with platelet count <100, RN must forward to provider)    Is patient taking any Blood Thinners OR Antiplatelet medication?  No   If hold needed, do NOT schedule, route to RN pool    Examples:   o Blood Thinners: (Coumadin, Warfarin, Jantoven, Pradaxa, Xarelto, Eliquis, Edoxaban, Enoxaparin, Lovenox, Heparin, Arixtra, Fondaparinux or Fragmin)  o Antiplatelet Medications: (Plavix, Brilinta or Effient)     Is patient taking any aspirin products (includes Excedrin and Fiorinal)? No     If more than 325mg/day, OK to schedule; Instruct Pt to decrease to less than 325 mg for 7 days AND route to RN pool    For CERVICAL procedures, hold all aspirin products for 6 days.     Tell Pt that if aspirin product is not held for 6 days, the procedure WILL BE cancelled.     Any allergies to contrast dye, iodine, shellfish,  or numbing and steroid medications? No    If YES, schedule and add allergy information to appointment notes AND route to the RN pool     If JAIME and Contrast Dye / Iodine Allergy? DO NOT SCHEDULE, route to RN pool    Allergies: Penicillins and Ritalin [methylphenidate]     Does patient have an active infection or treated for one within the past week? No    Is patient currently taking any antibiotics or steroid medications?  No     For patients on chronic, preventative, or prophylactic antibiotics, procedures may be scheduled.     For patients on antibiotics for active or recent infection, schedule 4 days after completed.    For patients on steroid medications, schedule 4 days after completed.     Has the patient had a flu shot or any other vaccinations within the past 7 days? No  If yes, explain that for the vaccine to work best they need to:       wait 1 week before and 1 week after getting any Vaccine    wait 1 week before and 2 weeks after getting Covid Vaccine #2 or BOOSTER    If patient has concerns about the timing, send to RN pool     Does patient have an MRI/CT?  Not Applicable Include Date and Check Procedure Scheduling Grid to see if required.    Was the MRI/CT done within the last 3 years?  NA     If no route to RN Pool    If yes, where was the MRI/CT done?      Refer to PACS Transmissions list for approved external locations and route to RN Pool High Priority    If MRI was not done at approved external location do NOT schedule and route to RN pool.      If patient has an imaging disc, the injection MAY be scheduled but patient must bring disc to appt or appt will be cancelled.    Procedure Specific Instructions:    If celiac plexus block, informed patient NPO for 6 hours and that it is okay to take medications with sips of water, especially blood pressure medications Not Applicable         If this is for a cervical procedure, informed patient that aspirin needs to be held for 6 days.   Not  Applicable      Sedation, If Sedation is ordered for any procedure, patient must be NPO for 6 hours prior to procedure Not Applicable      If IV needed:    Do not schedule procedures requiring IV placement in the first appointment of the day or first appointment after lunch. Do NOT schedule at 0745, 0815 or 1245. ok    Instructed patient to arrive 30 minutes early for IV start if required. (Check Procedure Scheduling Grid)  Not Applicable    Reminders:    If you are started on any steroids or antibiotics between now and your appointment, you must contact us because the procedure may need to be cancelled.  Yes      As a reminder, receiving steroids can decrease your body's ability to fight infection.   Would you still like to move forward with scheduling the injection?  Yes      IV Sedation is not provided for procedures. If oral anti-anxiety medication is needed, the patient should request this from their referring provider.      Instruct patient to arrive as directed prior to the scheduled appointment time:  Rachel: 30 minutes before; if IV needed 1 hour before       For patients 85 or older we recommend having an adult stay w/ them for the remainder of the day.       If the patient is Diabetic, remind them to bring their glucometer.      Does the patient have any questions?  NO  Alize Grimes  [facility]

## 2022-07-28 NOTE — TELEPHONE ENCOUNTER
Patient Quality Outreach    Patient is due for the following:   Physical  - due now    NEXT STEPS:   Patient has upcoming appointment, these items will be addressed at that time.    Type of outreach:    Chart review performed, no outreach needed.      Questions for provider review:    None     Debra Salgado, [facility]

## 2022-08-04 ENCOUNTER — TELEPHONE (OUTPATIENT)
Dept: FAMILY MEDICINE | Facility: CLINIC | Age: 47
End: 2022-08-04

## 2022-08-04 ENCOUNTER — RADIOLOGY INJECTION OFFICE VISIT (OUTPATIENT)
Dept: PALLIATIVE MEDICINE | Facility: CLINIC | Age: 47
End: 2022-08-04
Attending: PHYSICIAN ASSISTANT
Payer: COMMERCIAL

## 2022-08-04 VITALS — HEART RATE: 89 BPM | SYSTOLIC BLOOD PRESSURE: 152 MMHG | DIASTOLIC BLOOD PRESSURE: 111 MMHG

## 2022-08-04 DIAGNOSIS — M53.3 SI (SACROILIAC) JOINT DYSFUNCTION: ICD-10-CM

## 2022-08-04 PROCEDURE — 27096 INJECT SACROILIAC JOINT: CPT | Mod: 50 | Performed by: PAIN MEDICINE

## 2022-08-04 RX ORDER — TRIAMCINOLONE ACETONIDE 40 MG/ML
40 INJECTION, SUSPENSION INTRA-ARTICULAR; INTRAMUSCULAR ONCE
Status: COMPLETED | OUTPATIENT
Start: 2022-08-04 | End: 2022-08-04

## 2022-08-04 RX ADMIN — TRIAMCINOLONE ACETONIDE 40 MG: 40 INJECTION, SUSPENSION INTRA-ARTICULAR; INTRAMUSCULAR at 15:12

## 2022-08-04 ASSESSMENT — PAIN SCALES - GENERAL
PAINLEVEL: MILD PAIN (3)
PAINLEVEL: NO PAIN (0)

## 2022-08-04 NOTE — NURSING NOTE
Discharge Information    IV Discontiued Time:  NA    Amount of Fluid Infused:  NA    Discharge Criteria = When patient returns to baseline or as per MD order    Consciousness:  Pt is fully awake    Circulation:  BP +/- 20% of pre-procedure level    Respiration:  Patient is able to breathe deeply    O2 Sat:  Patient is able to maintain O2 Sat >92% on room air    Activity:  Moves 4 extremities on command    Ambulation:  Patient is able to stand and walk or stand and pivot into wheelchair    Dressing:  Clean/dry or No Dressing    Notes:   Discharge instructions and AVS given to patient    Patient meets criteria for discharge?  YES    Admitted to PCU?  No    Responsible adult present to accompany patient home?  Yes    Signature/Title:    Sheila Molina RN  RN Care Coordinator  [facility]

## 2022-08-04 NOTE — PROGRESS NOTES
Pre procedure Diagnosis: SI joint dysfunction    Post procedure Diagnosis: Same  Procedure performed: bilateral SI joint injection  Anesthesia: none  Complications: none  Operators: Philip Liao MD     Indications:   Doug Galicia is a 46 year old male. The patient has a history of bilateral buttocks pain. Other conservative treatments prior to injection include med/spt/injections.    Options/alternatives, benefits and risks were discussed with the patient including bleeding, infection, tissue trauma, exposure to radiation, reaction to medications including seizure, nerve injury, weakness, and numbness.  Questions were answered to his satisfaction and he agrees to proceed. Voluntary informed consent was obtained and signed.     Vitals were reviewed: Ye  Allergies were reviewed:  Yes   Medications were reviewed:  Yes   Pre-procedure pain score: 2/10    Procedure:  After obtaining signed informed consent, the patient was brought into the procedure suite and was placed in a prone position on the procedure table.   A Pause for the Cause was performed.  The patient was prepped and draped in the usual sterile fashion.     After identifying the bilateral SI joint, the C-arm was rotated obliquely to obtain the best view of the inferior angle of the joint.  Then 3 ml of Lidocaine 1%  was used to anesthetize the skin at a skin entry site coaxial with the fluoroscopy beam at this location.  A 22 gauge 3.5 inch needle was advanced under intermittent fluoroscopy until it was felt to enter the SI joint.  Aspiration was negative.    A total of 1ml of Omnipaque-300 was injected, confirming appropriate position, with spread into the intraarticular space, with no intravascular uptake noted.  9ml of Omnipaque was wasted. Location was verified in lateral view.    2 ml of 0.5% bupivacaine with 40mg of Kenalog was injected.  The needle was removed.     Hemostasis was achieved, the area was cleaned, and bandaids were placed when  appropriate.  The patient tolerated the procedure well, and was taken to the recovery room.  Images were saved to PACS.    Post-procedure pain score: 3/10  Follow-up includes:   -f/u phone call in one week  -f/u with referring Physician     Philip Liao MD  [facility] [place]

## 2022-08-04 NOTE — NURSING NOTE
Pre-procedure Intake  If YES to any questions or NO to having a   Please complete laminated checklist and leave on the computer keyboard for Provider, verbally inform provider if able.    For SCS Trial, RFA's or any sedation procedure:  Have you been fasting? NA    If yes, for how long?     Are you taking any any blood thinners such as Coumadin, Warfarin, Jantoven, Pradaxa Xarelto, Eliquis, Edoxaban, Enoxaparin, Lovenox, Heparin, Arixtra, Fondaparinux, or Fragmin? OR Antiplatelet medication such as Plavix, Brilinta, or Effient?   No     If yes, when did you take your last dose?     Do you take aspirin?  No    If cervical procedure, have you held aspirin for 6 days?   NA    Do you have any allergies to contrast dye, iodine, steroid and/or numbing medications?  NO    Are you currently taking antibiotics or have an active infection?  NO    Have you had a fever/elevated temperature within the past week? NO    Are you currently taking oral steroids? NO    Do you have a ? Yes    Are you pregnant or breastfeeding?  Not Applicable    Have you received the COVID-19 vaccine? Yes    If yes, was it your 1st, 2nd or only dose needed? 2nd dose and booster    Date of most recent vaccine: 12/22/2021    Notify provider and RNs if systolic BP >170, diastolic BP >100, P >100 or O2 sats < 90%    Cee Peña MA  [facility] [place]

## 2022-08-04 NOTE — PATIENT INSTRUCTIONS
[facility]   Procedure Discharge Instructions    Today you saw:  Dr. Philip Liao,       You had an:  Repeat sacroiliac joint injection       Medications used:  Lidocaine   Bupivacaine  Omnipaque   Kenalog           Be cautious when walking. Numbness and/or weakness in the lower extremities may occur for up to 6-8 hours after the procedure due to effect of the local anesthetic  Do not drive for 6 hours. The effect of the local anesthetic could slow your reflexes.   You may resume your regular activities after 24 hours  Avoid strenuous activity for the first 24 hours  You may shower, however avoid swimming, tub baths or hot tubs for 24 hours following your procedure  You may have a mild to moderate increase in pain for several days following the injection.  It may take up to 14 days for the steroid medication to start working although you may feel the effect as early as a few days after the procedure.     You may use ice packs for 10-15 minutes, 3 to 4 times a day at the injection site for comfort  Do not use heat to painful areas for 6 to 8 hours. This will give the local anesthetic time to wear off and prevent you from accidentally burning your skin.   Unless you have been directed to avoid the use of anti-inflammatory medications (NSAIDS), you may use medications such as ibuprofen, Aleve or Tylenol for pain control if needed.   If you were fasting, you may resume your normal diet and medications after the procedure  If you have diabetes, check your blood sugar more frequently than usual as your blood sugar may be higher than normal for 10-14 days following a steroid injection. Contact your doctor who manages your diabetes if your blood sugar is higher than usual  Possible side effects of steroids that you may experience include flushing, elevated blood pressure, increased appetite, mild headaches and restlessness.  All of these symptoms will get better with time.  If you  experience any of the following, call the Pain Clinic during work hours (Mon-Friday 8-4:30 pm) at [phone] or the Provider Line after hours at [phone]:  -Fever over 100 degree F  -Swelling, bleeding, redness, drainage, warmth at the injection site  -Progressive weakness or numbness in your legs  -Loss of bowel or bladder function  -Unusual headache that is not relieved by Tylenol or other pain reliever  -Unusual new onset of pain that is not improving

## 2022-09-02 DIAGNOSIS — K21.9 GASTROESOPHAGEAL REFLUX DISEASE, UNSPECIFIED WHETHER ESOPHAGITIS PRESENT: ICD-10-CM

## 2022-09-05 RX ORDER — NICOTINE POLACRILEX 4 MG/1
20-40 GUM, CHEWING ORAL DAILY
Qty: 180 TABLET | Refills: 0 | Status: SHIPPED | OUTPATIENT
Start: 2022-09-05 | End: 2022-11-30

## 2022-10-03 ASSESSMENT — ENCOUNTER SYMPTOMS
DYSURIA: 0
FEVER: 0
ARTHRALGIAS: 0
DIZZINESS: 0
NERVOUS/ANXIOUS: 0
PALPITATIONS: 0
FREQUENCY: 0
SHORTNESS OF BREATH: 0
HEMATOCHEZIA: 0
COUGH: 0
MYALGIAS: 0
DIARRHEA: 0
CONSTIPATION: 0
NAUSEA: 0
HEARTBURN: 0
PARESTHESIAS: 0
HEADACHES: 0
WEAKNESS: 0
SORE THROAT: 0
JOINT SWELLING: 0
EYE PAIN: 0
CHILLS: 0
ABDOMINAL PAIN: 0
HEMATURIA: 0

## 2022-10-10 ENCOUNTER — OFFICE VISIT (OUTPATIENT)
Dept: FAMILY MEDICINE | Facility: CLINIC | Age: 47
End: 2022-10-10
Payer: COMMERCIAL

## 2022-10-10 VITALS
RESPIRATION RATE: 20 BRPM | SYSTOLIC BLOOD PRESSURE: 149 MMHG | HEART RATE: 105 BPM | TEMPERATURE: 97.8 F | BODY MASS INDEX: 31.75 KG/M2 | WEIGHT: 239.6 LBS | HEIGHT: 73 IN | DIASTOLIC BLOOD PRESSURE: 98 MMHG | OXYGEN SATURATION: 97 %

## 2022-10-10 DIAGNOSIS — Z00.00 ROUTINE GENERAL MEDICAL EXAMINATION AT A HEALTH CARE FACILITY: Primary | ICD-10-CM

## 2022-10-10 DIAGNOSIS — Z12.11 COLON CANCER SCREENING: ICD-10-CM

## 2022-10-10 DIAGNOSIS — F90.9 ATTENTION DEFICIT HYPERACTIVITY DISORDER (ADHD), UNSPECIFIED ADHD TYPE: ICD-10-CM

## 2022-10-10 DIAGNOSIS — Z13.220 SCREENING FOR HYPERLIPIDEMIA: ICD-10-CM

## 2022-10-10 DIAGNOSIS — Z11.59 NEED FOR HEPATITIS C SCREENING TEST: ICD-10-CM

## 2022-10-10 DIAGNOSIS — Z79.899 ENCOUNTER FOR LONG-TERM (CURRENT) USE OF MEDICATIONS: ICD-10-CM

## 2022-10-10 DIAGNOSIS — G89.4 CHRONIC PAIN SYNDROME: Chronic | ICD-10-CM

## 2022-10-10 DIAGNOSIS — Z23 HIGH PRIORITY FOR 2019-NCOV VACCINE: ICD-10-CM

## 2022-10-10 PROCEDURE — 90686 IIV4 VACC NO PRSV 0.5 ML IM: CPT | Performed by: PHYSICIAN ASSISTANT

## 2022-10-10 PROCEDURE — 99396 PREV VISIT EST AGE 40-64: CPT | Mod: 25 | Performed by: PHYSICIAN ASSISTANT

## 2022-10-10 PROCEDURE — 90471 IMMUNIZATION ADMIN: CPT | Performed by: PHYSICIAN ASSISTANT

## 2022-10-10 PROCEDURE — 99214 OFFICE O/P EST MOD 30 MIN: CPT | Mod: 25 | Performed by: PHYSICIAN ASSISTANT

## 2022-10-10 RX ORDER — DEXTROAMPHETAMINE SACCHARATE, AMPHETAMINE ASPARTATE, DEXTROAMPHETAMINE SULFATE AND AMPHETAMINE SULFATE 5; 5; 5; 5 MG/1; MG/1; MG/1; MG/1
20 TABLET ORAL DAILY
Qty: 30 TABLET | Refills: 0 | Status: SHIPPED | OUTPATIENT
Start: 2022-12-19 | End: 2023-01-13

## 2022-10-10 RX ORDER — DEXTROAMPHETAMINE SACCHARATE, AMPHETAMINE ASPARTATE MONOHYDRATE, DEXTROAMPHETAMINE SULFATE AND AMPHETAMINE SULFATE 3.75; 3.75; 3.75; 3.75 MG/1; MG/1; MG/1; MG/1
15 CAPSULE, EXTENDED RELEASE ORAL DAILY
Qty: 30 CAPSULE | Refills: 0 | Status: SHIPPED | OUTPATIENT
Start: 2022-12-19 | End: 2023-02-06

## 2022-10-10 RX ORDER — DEXTROAMPHETAMINE SACCHARATE, AMPHETAMINE ASPARTATE, DEXTROAMPHETAMINE SULFATE AND AMPHETAMINE SULFATE 5; 5; 5; 5 MG/1; MG/1; MG/1; MG/1
20 TABLET ORAL DAILY
Qty: 30 TABLET | Refills: 0 | Status: SHIPPED | OUTPATIENT
Start: 2022-10-20 | End: 2023-01-13

## 2022-10-10 RX ORDER — DEXTROAMPHETAMINE SACCHARATE, AMPHETAMINE ASPARTATE MONOHYDRATE, DEXTROAMPHETAMINE SULFATE AND AMPHETAMINE SULFATE 3.75; 3.75; 3.75; 3.75 MG/1; MG/1; MG/1; MG/1
15 CAPSULE, EXTENDED RELEASE ORAL DAILY
Qty: 30 CAPSULE | Refills: 0 | Status: SHIPPED | OUTPATIENT
Start: 2022-11-19 | End: 2023-01-13

## 2022-10-10 RX ORDER — DEXTROAMPHETAMINE SACCHARATE, AMPHETAMINE ASPARTATE, DEXTROAMPHETAMINE SULFATE AND AMPHETAMINE SULFATE 5; 5; 5; 5 MG/1; MG/1; MG/1; MG/1
20 TABLET ORAL DAILY
Qty: 30 TABLET | Refills: 0 | Status: SHIPPED | OUTPATIENT
Start: 2022-11-19 | End: 2023-01-13

## 2022-10-10 RX ORDER — DEXTROAMPHETAMINE SACCHARATE, AMPHETAMINE ASPARTATE MONOHYDRATE, DEXTROAMPHETAMINE SULFATE AND AMPHETAMINE SULFATE 3.75; 3.75; 3.75; 3.75 MG/1; MG/1; MG/1; MG/1
15 CAPSULE, EXTENDED RELEASE ORAL DAILY
Qty: 30 CAPSULE | Refills: 0 | Status: SHIPPED | OUTPATIENT
Start: 2022-10-20 | End: 2023-01-13

## 2022-10-10 ASSESSMENT — ENCOUNTER SYMPTOMS
SORE THROAT: 0
HEARTBURN: 0
HEADACHES: 0
NERVOUS/ANXIOUS: 0
COUGH: 0
ABDOMINAL PAIN: 0
ARTHRALGIAS: 0
HEMATURIA: 0
DYSURIA: 0
PARESTHESIAS: 0
WEAKNESS: 0
FREQUENCY: 0
PALPITATIONS: 0
CHILLS: 0
HEMATOCHEZIA: 0
CONSTIPATION: 0
NAUSEA: 0
DIZZINESS: 0
FEVER: 0
EYE PAIN: 0
MYALGIAS: 0
DIARRHEA: 0
JOINT SWELLING: 0
SHORTNESS OF BREATH: 0

## 2022-10-10 ASSESSMENT — PAIN SCALES - GENERAL: PAINLEVEL: NO PAIN (0)

## 2022-10-10 NOTE — PATIENT INSTRUCTIONS
Call your insurance and ask them the following:  -- Is a colonoscopy covered at age 45 for colon cancer screening. Also you have a family hx of colon cancer.  -- Is a 24-hour home blood pressure monitor covered for a diagnosis of elevated blood pressure without a diagnosis of hypertension.      Preventive Health Recommendations  Male Ages 40 to 49    Yearly exam:             See your health care provider every year in order to  o   Review health changes.   o   Discuss preventive care.    o   Review your medicines if your doctor has prescribed any.  You should be tested each year for STDs (sexually transmitted diseases) if you re at risk.   Have a cholesterol test every 5 years.   Have a colonoscopy (test for colon cancer) if someone in your family has had colon cancer or polyps before age 50.   After age 45, have a diabetes test (fasting glucose). If you are at risk for diabetes, you should have this test every 3 years.    Talk with your health care provider about whether or not a prostate cancer screening test (PSA) is right for you.    Shots: Get a flu shot each year. Get a tetanus shot every 10 years.     Nutrition:  Eat at least 5 servings of fruits and vegetables daily.   Eat whole-grain bread, whole-wheat pasta and brown rice instead of white grains and rice.   Get adequate Calcium and Vitamin D.     Lifestyle  Exercise for at least 150 minutes a week (30 minutes a day, 5 days a week). This will help you control your weight and prevent disease.   Limit alcohol to one drink per day.   No smoking.   Wear sunscreen to prevent skin cancer.   See your dentist every six months for an exam and cleaning.

## 2022-10-10 NOTE — PROGRESS NOTES
SUBJECTIVE:   CC: Abdias is an 46 year old who presents for preventative health visit.       Patient has been advised of split billing requirements and indicates understanding: Yes  Healthy Habits:     Getting at least 3 servings of Calcium per day:  Yes    Bi-annual eye exam:  NO    Dental care twice a year:  NO    Sleep apnea or symptoms of sleep apnea:  None    Diet:  Regular (no restrictions)    Frequency of exercise:  2-3 days/week    Duration of exercise:  Other    Taking medications regularly:  Yes    Medication side effects:  None    PHQ-2 Total Score: 0    Additional concerns today:  No      The ASCVD Risk score (Matthew DAMON, et al., 2019) failed to calculate for the following reasons:    Cannot find a previous HDL lab    Cannot find a previous total cholesterol lab     PROBLEMS TO ADD ON...  -------------------------------------  Refill omeprazole (KLS OMPERAZOLE) 20 MG tablet    Needs FMLA filled out  Chronic back pain    ADHD follow up  Uses Adderall XR and Adderall IR  Meds working well   Focus is under good control     Sleep follow up   Uses Ambien  Sleeping well when he takes it  Wakes up in the middle of the night less    Chronic pain follow up  Uses tramadol PRN - can go without for a few days at a time, sometimes uses 2 tabs per day  Robaxin PRN        Today's PHQ-2 Score:   PHQ-2 ( 1999 Pfizer) 10/3/2022   Q1: Little interest or pleasure in doing things 0   Q2: Feeling down, depressed or hopeless 0   PHQ-2 Score 0   Q1: Little interest or pleasure in doing things Not at all   Q2: Feeling down, depressed or hopeless Not at all   PHQ-2 Score 0       Abuse: Current or Past(Physical, Sexual or Emotional)- No  Do you feel safe in your environment? Yes    Have you ever done Advance Care Planning? (For example, a Health Directive, POLST, or a discussion with a medical provider or your loved ones about your wishes): No, advance care planning information given to patient to review.  Patient plans to discuss  "their wishes with loved ones or provider.      Social History     Tobacco Use     Smoking status: Former     Types: Dip, chew, snus or snuff     Smokeless tobacco: Current     Types: Chew     Tobacco comments:     Would LOVE to try any scrip I can get to aid in not using using smokless tabacco   Substance Use Topics     Alcohol use: No         Alcohol Use 10/3/2022   Prescreen: >3 drinks/day or >7 drinks/week? No       Last PSA: No results found for: PSA    Reviewed orders with patient. Reviewed health maintenance and updated orders accordingly - Yes  Labs reviewed in EPIC    Reviewed and updated as needed this visit by clinical staff   Tobacco  Allergies  Meds   Med Hx  Surg Hx  Fam Hx  Soc Hx        Reviewed and updated as needed this visit by Provider                 Past Medical History:   Diagnosis Date     Mild depression     situation related to mother dying         Review of Systems   Constitutional: Negative for chills and fever.   HENT: Negative for congestion, ear pain, hearing loss and sore throat.    Eyes: Negative for pain and visual disturbance.   Respiratory: Negative for cough and shortness of breath.    Cardiovascular: Negative for chest pain, palpitations and peripheral edema.   Gastrointestinal: Negative for abdominal pain, constipation, diarrhea, heartburn, hematochezia and nausea.   Genitourinary: Negative for dysuria, frequency, genital sores, hematuria, impotence, penile discharge and urgency.   Musculoskeletal: Negative for arthralgias, joint swelling and myalgias.   Skin: Negative for rash.   Neurological: Negative for dizziness, weakness, headaches and paresthesias.   Psychiatric/Behavioral: Negative for mood changes. The patient is not nervous/anxious.        OBJECTIVE:   BP (!) 149/98 (BP Location: Left arm, Patient Position: Sitting, Cuff Size: Adult Large)   Pulse 105   Temp 97.8  F (36.6  C) (Tympanic)   Resp 20   Ht 1.845 m (6' 0.64\")   Wt 108.7 kg (239 lb 9.6 oz)   SpO2 " 97%   BMI 31.93 kg/m      Physical Exam  GENERAL: healthy, alert and no distress  EYES: Eyes grossly normal to inspection, PERRL and conjunctivae and sclerae normal  HENT: ear canals and TM's normal, nose and mouth without ulcers or lesions  NECK: no adenopathy, no asymmetry, masses, or scars and thyroid normal to palpation  RESP: lungs clear to auscultation - no rales, rhonchi or wheezes  CV: regular rates and rhythm, normal S1 S2, no S3 or S4 and no murmur, click or rub  ABDOMEN: soft, nontender, no hepatosplenomegaly, no masses and bowel sounds normal  MS: no gross musculoskeletal defects noted, no edema  SKIN: no suspicious lesions or rashes  NEURO: Normal strength and tone, mentation intact and speech normal  PSYCH: mentation appears normal, affect normal/bright    ASSESSMENT/PLAN:       ICD-10-CM    1. Routine general medical examination at a health care facility  Z00.00 Glucose      2. High priority for 2019-nCoV vaccine  Z23       3. Colon cancer screening  Z12.11 Colonoscopy Screening  Referral      4. Need for hepatitis C screening test  Z11.59 Hepatitis C Screen Reflex to HCV RNA Quant and Genotype      5. Screening for hyperlipidemia  Z13.220 Lipid panel reflex to direct LDL Non-fasting      6. Chronic pain syndrome  G89.4       7. Attention deficit hyperactivity disorder (ADHD), unspecified ADHD type  F90.9 amphetamine-dextroamphetamine (ADDERALL) 20 MG tablet     amphetamine-dextroamphetamine (ADDERALL) 20 MG tablet     amphetamine-dextroamphetamine (ADDERALL) 20 MG tablet     amphetamine-dextroamphetamine (ADDERALL XR) 15 MG 24 hr capsule     amphetamine-dextroamphetamine (ADDERALL XR) 15 MG 24 hr capsule     amphetamine-dextroamphetamine (ADDERALL XR) 15 MG 24 hr capsule      8. Encounter for long-term (current) use of medications  Z79.899 Urine Drugs of Abuse Screen Panel 13          1-5) Screenings discussed. Labs in near future.     6) Pain under good control. He will let me know when he  "needs a tramadol refill. UDS in near future. FMLA reviewed and completed today. Follow up e-visit in 6 months.    7,8) ADHD stable. Meds renewed, no changes. UDS in near future. Follow up e-visit in 6 months.    Patient Instructions   Call your insurance and ask them the following:  -- Is a colonoscopy covered at age 45 for colon cancer screening. Also you have a family hx of colon cancer.  -- Is a 24-hour home blood pressure monitor covered for a diagnosis of elevated blood pressure without a diagnosis of hypertension.        COUNSELING:   Reviewed preventive health counseling, as reflected in patient instructions    Estimated body mass index is 31.93 kg/m  as calculated from the following:    Height as of this encounter: 1.845 m (6' 0.64\").    Weight as of this encounter: 108.7 kg (239 lb 9.6 oz).     He reports that he has quit smoking. His smoking use included dip, chew, snus or snuff. His smokeless tobacco use includes chew.      Counseling Resources:  ATP IV Guidelines  Pooled Cohorts Equation Calculator  FRAX Risk Assessment  ICSI Preventive Guidelines  Dietary Guidelines for Americans, 2010  USDA's MyPlate  ASA Prophylaxis  Lung CA Screening    Blanche Barrera PA-C  [facility]  "

## 2022-11-29 ENCOUNTER — LAB (OUTPATIENT)
Dept: LAB | Facility: CLINIC | Age: 47
End: 2022-11-29
Payer: COMMERCIAL

## 2022-11-29 DIAGNOSIS — Z11.59 NEED FOR HEPATITIS C SCREENING TEST: ICD-10-CM

## 2022-11-29 DIAGNOSIS — Z79.899 ENCOUNTER FOR LONG-TERM (CURRENT) USE OF MEDICATIONS: ICD-10-CM

## 2022-11-29 DIAGNOSIS — Z13.220 SCREENING FOR HYPERLIPIDEMIA: ICD-10-CM

## 2022-11-29 DIAGNOSIS — Z00.00 ROUTINE GENERAL MEDICAL EXAMINATION AT A HEALTH CARE FACILITY: ICD-10-CM

## 2022-11-29 LAB
AMPHETAMINES UR QL: DETECTED
BARBITURATES UR QL SCN: NOT DETECTED
BENZODIAZ UR QL SCN: NOT DETECTED
BUPRENORPHINE UR QL: NOT DETECTED
CANNABINOIDS UR QL: NOT DETECTED
CHOLEST SERPL-MCNC: 188 MG/DL
COCAINE UR QL SCN: NOT DETECTED
D-METHAMPHET UR QL: NOT DETECTED
FASTING STATUS PATIENT QL REPORTED: YES
FASTING STATUS PATIENT QL REPORTED: YES
GLUCOSE BLD-MCNC: 129 MG/DL (ref 70–99)
HCV AB SERPL QL IA: NONREACTIVE
HDLC SERPL-MCNC: 44 MG/DL
LDLC SERPL CALC-MCNC: 101 MG/DL
METHADONE UR QL SCN: NOT DETECTED
NONHDLC SERPL-MCNC: 144 MG/DL
OPIATES UR QL SCN: NOT DETECTED
OXYCODONE UR QL SCN: NOT DETECTED
PCP UR QL SCN: NOT DETECTED
PROPOXYPH UR QL: NOT DETECTED
TRICYCLICS UR QL SCN: NOT DETECTED
TRIGL SERPL-MCNC: 217 MG/DL

## 2022-11-29 PROCEDURE — 36415 COLL VENOUS BLD VENIPUNCTURE: CPT

## 2022-11-29 PROCEDURE — 82947 ASSAY GLUCOSE BLOOD QUANT: CPT

## 2022-11-29 PROCEDURE — 80306 DRUG TEST PRSMV INSTRMNT: CPT

## 2022-11-29 PROCEDURE — 80061 LIPID PANEL: CPT

## 2022-11-29 PROCEDURE — 86803 HEPATITIS C AB TEST: CPT

## 2023-01-01 ENCOUNTER — MYC MEDICAL ADVICE (OUTPATIENT)
Dept: FAMILY MEDICINE | Facility: CLINIC | Age: 48
End: 2023-01-01

## 2023-01-01 DIAGNOSIS — M53.3 SI (SACROILIAC) JOINT DYSFUNCTION: Primary | ICD-10-CM

## 2023-01-09 ENCOUNTER — TELEPHONE (OUTPATIENT)
Dept: PALLIATIVE MEDICINE | Facility: CLINIC | Age: 48
End: 2023-01-09

## 2023-01-09 NOTE — TELEPHONE ENCOUNTER
Screening Questions for Radiology Injections:    Injection to be done at which interventional clinic site? [facility] [place]    Procedure ordered by Dr. Liao     Procedure ordered? Bilateral SI Joint Injections     Transforaminal Cervical JAIME - Send to [facility] [place] [place] ([facility]) - No [facility] providers perform this procedure      What insurance would patient like us to bill for this procedure? PreferredOne/ AETNA      Worker's comp or MVA (motor vehicle accident) -Any injection DO NOT SCHEDULE and route to Jhonathan Grimes.      [employer] insurance - For SI joint injections, DO NOT SCHEDULE and route to Tami Flex.       ALL BCBS, Humana and [employer] CIGNA - DO NOT SCHEDULE and route to Tami Mccord    MEDICA- facet joint injections, route to Tami Flex    IF SCHEDULING IN [place] PLEASE SCHEDULE AT LEAST 7-10 BUSINESS DAYS OUT SO A PA CAN BE OBTAINED    Is an  needed? No     Patient has a  home? (Review Grid) YES: Informed     Any chance of pregnancy? Not Applicable   If YES, do NOT schedule and route to RN pool  - Dr. Rascon route to Cee Estevez and PM&R Nurse  [[id number]]      Is patient actively being treated for cancer or immunocompromised? No  If YES, do NOT schedule and route to RN pool/ Dr. Rascon's Team    Does the patient have a bleeding or clotting disorder? No     If YES, okay to schedule AND route to RN nurse magy/ Dr. Rascon's Team     (For any patients with platelet count <100, RN must forward to provider)    Is patient taking any Blood Thinners OR Antiplatelet medication?  No   If hold needed, do NOT schedule, route to RN pool/ Dr. Rascon's Team    Examples:   o Blood Thinners: (Coumadin, Warfarin, Jantoven, Pradaxa, Xarelto, Eliquis, Edoxaban, Enoxaparin, Lovenox, Heparin, Arixtra, Fondaparinux or Fragmin)  o Antiplatelet Medications: (Plavix, Brilinta or Effient)     Is patient taking any aspirin products (includes Excedrin and Fiorinal)? No     If more  than 325mg/day, OK to schedule; Instruct Pt to decrease to less than 325 mg for 7 days AND route to RN pool/ Dr. Rascon's Team     For CERVICAL procedures, hold all aspirin products for 6 days.     Tell Pt that if aspirin product is not held for 6 days, the procedure WILL BE cancelled.     Any allergies to contrast dye, iodine, shellfish, or numbing and steroid medications? No    If YES, schedule and add allergy information to appointment notes AND route to the RN pool/ Dr. Rascon's Team    If JAIME and Contrast Dye / Iodine Allergy? DO NOT SCHEDULE, route to RN pool/ Dr. Rascon's Team    Allergies: Penicillins and Ritalin [methylphenidate]     Does patient have an active infection or treated for one within the past week? No    Is patient currently taking any antibiotics or steroid medications?  No     For patients on chronic, preventative, or prophylactic antibiotics, procedures may be scheduled.     For patients on antibiotics for active or recent infection, schedule 4 days after completed.    For patients on steroid medications, schedule 4 days after completed.     Has the patient had a flu shot or any other vaccinations within the past 7 days? No  If yes, explain that for the vaccine to work best they need to:       wait 1 week before and 1 week after getting any Vaccine    wait 1 week before and 2 weeks after getting Covid Vaccine #2 or BOOSTER    If patient has concerns about the timing, send to RN pool/ Dr. Rascon's Team    Does patient have an MRI/CT?  Not Applicable Include Date and Check Procedure Scheduling Grid to see if required.    Was the MRI/CT done within the last 3 years?  NA     If no route to RN Pool/ Dr. Paces Team    If yes, where was the MRI/CT done?        Refer to PACS Transmissions list for approved external locations and route to RN Pool High Priority/ Dr. Paces Team    If MRI was not done at approved external location do NOT schedule and route to RN pool/ Dr. Paces Team      If  patient has an imaging disc, the injection MAY be scheduled but patient must bring disc to appt or appt will be cancelled.    Is patient able to transfer to a procedure table with minimal or no assistance? Yes     If no, do NOT schedule and route to RN Pool/ Dr. Rascon's Team    Procedure Specific Instructions:    If celiac plexus block, informed patient NPO for 6 hours and that it is okay to take medications with sips of water, especially blood pressure medications Not Applicable         If this is for a cervical procedure, informed patient that aspirin needs to be held for 6 days.   Not Applicable      Sedation, If Sedation is ordered for any procedure, patient must be NPO for 6 hours prior to procedure Not Applicable      If IV needed:    Do not schedule procedures requiring IV placement in the first appointment of the day or first appointment after lunch. Do NOT schedule at 0745, 0815 or 1245.       Instructed patient to arrive 30 minutes early for IV start if required. (Check Procedure Scheduling Grid)  Not Applicable    Reminders:    If you are started on any steroids or antibiotics between now and your appointment, you must contact us because the procedure may need to be cancelled.  Yes      As a reminder, receiving steroids can decrease your body's ability to fight infection.   Would you still like to move forward with scheduling the injection?  Yes      IV Sedation is not provided for procedures. If oral anti-anxiety medication is needed, the patient should request this from their referring provider.      Instruct patient to arrive as directed prior to the scheduled appointment time:  If IV needed 30 minutes before appointment time       For patients 85 or older we recommend having an adult stay w/ them for the remainder of the day.       If the patient is Diabetic, remind them to bring their glucometer.      Does the patient have any questions?  NO  Ami Guerrero  [facility]

## 2023-01-27 ENCOUNTER — ANCILLARY ORDERS (OUTPATIENT)
Dept: PALLIATIVE MEDICINE | Facility: CLINIC | Age: 48
End: 2023-01-27

## 2023-01-27 DIAGNOSIS — M53.3 SI (SACROILIAC) JOINT DYSFUNCTION: ICD-10-CM

## 2023-02-07 ENCOUNTER — MYC MEDICAL ADVICE (OUTPATIENT)
Dept: FAMILY MEDICINE | Facility: CLINIC | Age: 48
End: 2023-02-07
Payer: COMMERCIAL

## 2023-02-09 ENCOUNTER — RADIOLOGY INJECTION OFFICE VISIT (OUTPATIENT)
Dept: PALLIATIVE MEDICINE | Facility: CLINIC | Age: 48
End: 2023-02-09
Attending: PAIN MEDICINE
Payer: COMMERCIAL

## 2023-02-09 VITALS — HEART RATE: 78 BPM | DIASTOLIC BLOOD PRESSURE: 94 MMHG | SYSTOLIC BLOOD PRESSURE: 139 MMHG | OXYGEN SATURATION: 97 %

## 2023-02-09 DIAGNOSIS — M53.3 SI (SACROILIAC) JOINT DYSFUNCTION: ICD-10-CM

## 2023-02-09 PROCEDURE — 27096 INJECT SACROILIAC JOINT: CPT | Mod: 50 | Performed by: PAIN MEDICINE

## 2023-02-09 RX ORDER — TRIAMCINOLONE ACETONIDE 40 MG/ML
40 INJECTION, SUSPENSION INTRA-ARTICULAR; INTRAMUSCULAR ONCE
Status: COMPLETED | OUTPATIENT
Start: 2023-02-09 | End: 2023-02-09

## 2023-02-09 RX ADMIN — TRIAMCINOLONE ACETONIDE 40 MG: 40 INJECTION, SUSPENSION INTRA-ARTICULAR; INTRAMUSCULAR at 14:27

## 2023-02-09 ASSESSMENT — PAIN SCALES - GENERAL
PAINLEVEL: NO PAIN (0)
PAINLEVEL: MODERATE PAIN (5)

## 2023-02-09 NOTE — PATIENT INSTRUCTIONS
[facility]   Procedure Discharge Instructions    Today you saw:  Dr. Philip Liao    You had BILATERAL SACROILIAC JOINT INJECTION     Medications used:  Lidocaine   Bupivacaine Omnipaque   Kenalog           If you have received sedation before, during, or after your procedure, for the next 24 hours you shall NOT:   -Drive  -Operate machinery  -Drink alcohol  -Sign any legal documents      If you were holding your blood thinning medication, please restart taking it: N/A  Be cautious when walking. Numbness and/or weakness in the lower extremities may occur for up to 6-8 hours after the procedure due to effect of the local anesthetic  Do not drive for 6 hours. The effect of the local anesthetic could slow your reflexes.   You may resume your regular activities after 24 hours  Avoid strenuous activity for the first 24 hours  You may shower, however avoid swimming, tub baths or hot tubs for 24 hours following your procedure  You may have a mild to moderate increase in pain for several days following the injection.  It may take up to 14 days for the steroid medication to start working although you may feel the effect as early as a few days after the procedure.     You may use ice packs for 10-15 minutes, 3 to 4 times a day at the injection site for comfort  Do not use heat to painful areas for 6 to 8 hours. This will give the local anesthetic time to wear off and prevent you from accidentally burning your skin.   Unless you have been directed to avoid the use of anti-inflammatory medications (NSAIDS), you may use medications such as ibuprofen, Aleve or Tylenol for pain control if needed.   If you were fasting, you may resume your normal diet and medications after the procedure  If you have diabetes, check your blood sugar more frequently than usual as your blood sugar may be higher than normal for 10-14 days following a steroid injection. Contact your doctor who manages your diabetes if  your blood sugar is higher than usual  Possible side effects of steroids that you may experience include flushing, elevated blood pressure, increased appetite, mild headaches and restlessness.  All of these symptoms will get better with time.  If you experience any of the following, call the Pain Clinic during work hours (Mon-Friday 8-4:30 pm) at [phone] or the Provider Line after hours at [phone]:  -Fever over 100 degree F  -Swelling, bleeding, redness, drainage, warmth at the injection site  -Progressive weakness or numbness in your legs or arms  -Loss of bowel or bladder function  -Unusual headache that is not relieved by Tylenol or other pain reliever  -Unusual new onset of pain that is not improving

## 2023-02-09 NOTE — LETTER
[facility] [place]  [address]  [place] [address]  Phone: [phone]  Fax: [phone]    February 9, 2023        Doug Galicia  [address] [address]          To whom it may concern:    RE: Doug Galicia    Patient was seen and treated today at our clinic please excuse any missed work on 2/9 & 2/10.    Please contact me for questions or concerns.      Sincerely,        Philip Liao MD

## 2023-02-09 NOTE — PROGRESS NOTES
Pre procedure Diagnosis: SI joint dysfunction    Post procedure Diagnosis: Same  Procedure performed: bilateral SI joint injection  Anesthesia: none  Complications: none  Operators: Philip Liao MD     Indications:   Doug Galicia is a 47 year old male. The patient has a history of bilateral upper buttocks pain. Other conservative treatments prior to injection include meds/pt.    Options/alternatives, benefits and risks were discussed with the patient including bleeding, infection, tissue trauma, exposure to radiation, reaction to medications including seizure, nerve injury, weakness, and numbness.  Questions were answered to his satisfaction and he agrees to proceed. Voluntary informed consent was obtained and signed.     Vitals were reviewed: Yes  Allergies were reviewed:  Yes   Medications were reviewed:  Yes   Pre-procedure pain score: 4/10    Procedure:  After obtaining signed informed consent, the patient was brought into the procedure suite and was placed in a prone position on the procedure table.   A Pause for the Cause was performed.  The patient was prepped and draped in the usual sterile fashion.     After identifying the bilateral SI joint, the C-arm was rotated obliquely to obtain the best view of the inferior angle of the joint.  Then 3 ml of Lidocaine 1%  was used to anesthetize the skin at a skin entry site coaxial with the fluoroscopy beam at this location.  A 22 gauge 3.5 inch needle was advanced under intermittent fluoroscopy until it was felt to enter the SI joint.  Aspiration was negative.    A total of 1ml of Omnipaque-300 was injected, confirming appropriate position, with spread into the intraarticular space, with no intravascular uptake noted.  9ml of Omnipaque was wasted. Location was verified in lateral view.    2 ml of 0.5% bupivacaine with 40mg of Kenalog was injected.  The needle was removed.     Hemostasis was achieved, the area was cleaned, and bandaids were placed when  appropriate.  The patient tolerated the procedure well, and was taken to the recovery room.  Images were saved to PACS.    Post-procedure pain score: 0/10  Follow-up includes:   -f/u phone call in one week  -f/u with referring Physician     Philip Liao MD  [facility]

## 2023-02-09 NOTE — NURSING NOTE
Pre-procedure Intake  If YES to any questions or NO to having a   Please complete laminated checklist and leave on the computer keyboard for Provider, verbally inform provider if able.    For SCS Trial, RFA's or any sedation procedure:  Have you been fasting? NA    If yes, for how long?     Are you taking any any blood thinners such as Coumadin, Warfarin, Jantoven, Pradaxa Xarelto, Eliquis, Edoxaban, Enoxaparin, Lovenox, Heparin, Arixtra, Fondaparinux, or Fragmin? OR Antiplatelet medication such as Plavix, Brilinta, or Effient?   No     If yes, when did you take your last dose?     Do you take aspirin?  No    If cervical procedure, have you held aspirin for 6 days?     Do you have any allergies to contrast dye, iodine, steroid and/or numbing medications?  NO    Are you currently taking antibiotics or have an active infection?  NO    Have you had a fever/elevated temperature within the past week? NO    Are you currently taking oral steroids? NO    Do you have a ? Yes    Are you pregnant or breastfeeding?  Not Applicable    Have you received the COVID-19 vaccine? Yes    If yes, was it your 1st, 2nd or only dose needed?     Date of most recent vaccine: 12/22/21    Notify provider and RNs if systolic BP >170, diastolic BP >100, P >100 or O2 sats < 90%

## 2023-02-15 ENCOUNTER — E-VISIT (OUTPATIENT)
Dept: FAMILY MEDICINE | Facility: CLINIC | Age: 48
End: 2023-02-15
Payer: COMMERCIAL

## 2023-02-15 DIAGNOSIS — F90.9 ATTENTION DEFICIT HYPERACTIVITY DISORDER (ADHD), UNSPECIFIED ADHD TYPE: ICD-10-CM

## 2023-02-15 PROCEDURE — 99421 OL DIG E/M SVC 5-10 MIN: CPT | Performed by: PHYSICIAN ASSISTANT

## 2023-02-15 RX ORDER — DEXTROAMPHETAMINE SACCHARATE, AMPHETAMINE ASPARTATE, DEXTROAMPHETAMINE SULFATE AND AMPHETAMINE SULFATE 2.5; 2.5; 2.5; 2.5 MG/1; MG/1; MG/1; MG/1
10 TABLET ORAL DAILY
Qty: 14 TABLET | Refills: 0 | Status: SHIPPED | OUTPATIENT
Start: 2023-02-17 | End: 2023-05-01

## 2023-02-15 RX ORDER — DEXTROAMPHETAMINE SACCHARATE, AMPHETAMINE ASPARTATE, DEXTROAMPHETAMINE SULFATE AND AMPHETAMINE SULFATE 2.5; 2.5; 2.5; 2.5 MG/1; MG/1; MG/1; MG/1
10 TABLET ORAL DAILY
Qty: 14 TABLET | Refills: 0 | Status: SHIPPED | OUTPATIENT
Start: 2023-04-18 | End: 2023-05-01

## 2023-02-15 RX ORDER — DEXTROAMPHETAMINE SACCHARATE, AMPHETAMINE ASPARTATE, DEXTROAMPHETAMINE SULFATE AND AMPHETAMINE SULFATE 5; 5; 5; 5 MG/1; MG/1; MG/1; MG/1
20 TABLET ORAL DAILY
Qty: 30 TABLET | Refills: 0 | Status: SHIPPED | OUTPATIENT
Start: 2023-04-18 | End: 2023-08-04

## 2023-02-15 RX ORDER — DEXTROAMPHETAMINE SACCHARATE, AMPHETAMINE ASPARTATE, DEXTROAMPHETAMINE SULFATE AND AMPHETAMINE SULFATE 2.5; 2.5; 2.5; 2.5 MG/1; MG/1; MG/1; MG/1
10 TABLET ORAL DAILY
Qty: 14 TABLET | Refills: 0 | Status: SHIPPED | OUTPATIENT
Start: 2023-03-19 | End: 2023-05-01

## 2023-02-15 RX ORDER — DEXTROAMPHETAMINE SACCHARATE, AMPHETAMINE ASPARTATE, DEXTROAMPHETAMINE SULFATE AND AMPHETAMINE SULFATE 5; 5; 5; 5 MG/1; MG/1; MG/1; MG/1
20 TABLET ORAL DAILY
Qty: 30 TABLET | Refills: 0 | Status: SHIPPED | OUTPATIENT
Start: 2023-03-19 | End: 2023-05-01

## 2023-02-27 ENCOUNTER — TELEPHONE (OUTPATIENT)
Dept: FAMILY MEDICINE | Facility: CLINIC | Age: 48
End: 2023-02-27
Payer: COMMERCIAL

## 2023-02-27 DIAGNOSIS — G89.29 CHRONIC BILATERAL LOW BACK PAIN, UNSPECIFIED WHETHER SCIATICA PRESENT: Chronic | ICD-10-CM

## 2023-02-27 DIAGNOSIS — M54.50 CHRONIC BILATERAL LOW BACK PAIN, UNSPECIFIED WHETHER SCIATICA PRESENT: Chronic | ICD-10-CM

## 2023-02-27 DIAGNOSIS — M53.3 SI (SACROILIAC) JOINT DYSFUNCTION: Primary | ICD-10-CM

## 2023-02-28 NOTE — TELEPHONE ENCOUNTER
Spoke with patient. Forms mailed to home address. Patient needs to sign his portion on forms. Copy sent to scanning.

## 2023-03-04 ENCOUNTER — MYC REFILL (OUTPATIENT)
Dept: FAMILY MEDICINE | Facility: CLINIC | Age: 48
End: 2023-03-04
Payer: COMMERCIAL

## 2023-03-04 DIAGNOSIS — K21.9 GASTROESOPHAGEAL REFLUX DISEASE, UNSPECIFIED WHETHER ESOPHAGITIS PRESENT: ICD-10-CM

## 2023-03-06 RX ORDER — NICOTINE POLACRILEX 4 MG/1
20-40 GUM, CHEWING ORAL DAILY
Qty: 180 TABLET | Refills: 0 | Status: SHIPPED | OUTPATIENT
Start: 2023-03-06 | End: 2023-05-21

## 2023-03-06 RX ORDER — NICOTINE POLACRILEX 4 MG/1
20-40 GUM, CHEWING ORAL DAILY
Qty: 180 TABLET | Refills: 0 | OUTPATIENT
Start: 2023-03-06

## 2023-03-06 NOTE — TELEPHONE ENCOUNTER
Refills remain on file. Refused.     Anita Bryan, RN, BSN, PHN  [facility]: [place]

## 2023-03-15 ENCOUNTER — MYC MEDICAL ADVICE (OUTPATIENT)
Dept: FAMILY MEDICINE | Facility: CLINIC | Age: 48
End: 2023-03-15
Payer: COMMERCIAL

## 2023-03-15 NOTE — TELEPHONE ENCOUNTER
Please call pharmacy and let them know patient is ok to  prescription a day early. [place]aving [place] for vacation on 3[place]19    [place] printed, in my basket. Patient would like to fill his portion our and sign

## 2023-05-05 ENCOUNTER — E-VISIT (OUTPATIENT)
Dept: FAMILY MEDICINE | Facility: CLINIC | Age: 48
End: 2023-05-05
Payer: COMMERCIAL

## 2023-05-05 ENCOUNTER — TELEPHONE (OUTPATIENT)
Dept: FAMILY MEDICINE | Facility: CLINIC | Age: 48
End: 2023-05-05

## 2023-05-05 DIAGNOSIS — F41.9 ANXIETY: Primary | ICD-10-CM

## 2023-05-05 PROCEDURE — 99421 OL DIG E/M SVC 5-10 MIN: CPT | Performed by: PHYSICIAN ASSISTANT

## 2023-05-05 RX ORDER — HYDROXYZINE HYDROCHLORIDE 25 MG/1
12.5-5 TABLET, FILM COATED ORAL EVERY 6 HOURS PRN
Qty: 40 TABLET | Refills: 1 | Status: SHIPPED | OUTPATIENT
Start: 2023-05-05 | End: 2023-05-24

## 2023-05-05 ASSESSMENT — ANXIETY QUESTIONNAIRES
7. FEELING AFRAID AS IF SOMETHING AWFUL MIGHT HAPPEN: SEVERAL DAYS
GAD7 TOTAL SCORE: 12
7. FEELING AFRAID AS IF SOMETHING AWFUL MIGHT HAPPEN: SEVERAL DAYS
8. IF YOU CHECKED OFF ANY PROBLEMS, HOW DIFFICULT HAVE THESE MADE IT FOR YOU TO DO YOUR WORK, TAKE CARE OF THINGS AT HOME, OR GET ALONG WITH OTHER PEOPLE?: SOMEWHAT DIFFICULT
6. BECOMING EASILY ANNOYED OR IRRITABLE: NOT AT ALL
GAD7 TOTAL SCORE: 12
5. BEING SO RESTLESS THAT IT IS HARD TO SIT STILL: NOT AT ALL
4. TROUBLE RELAXING: MORE THAN HALF THE DAYS
3. WORRYING TOO MUCH ABOUT DIFFERENT THINGS: NEARLY EVERY DAY
GAD7 TOTAL SCORE: 12
1. FEELING NERVOUS, ANXIOUS, OR ON EDGE: NEARLY EVERY DAY
2. NOT BEING ABLE TO STOP OR CONTROL WORRYING: NEARLY EVERY DAY

## 2023-05-05 NOTE — TELEPHONE ENCOUNTER
Forms received from: [facility]   Phone number listed: [phone]   Fax listed: [phone]  Date received: 5/2/23  Form description: [place] paperwork  Once forms are completed, please return to [facility] via fax [phone].  Is patient requesting to be contacted when forms are completed: na  Phone: na  Form placed:  Blanche Aguilar

## 2023-05-06 ASSESSMENT — ANXIETY QUESTIONNAIRES: GAD7 TOTAL SCORE: 12

## 2023-05-09 NOTE — TELEPHONE ENCOUNTER
"Spoke with patient, informed as below. Patient states \"he would like Blanche to handle forms however she would like\"    Patient says to disregard forms now.       "

## 2023-05-24 ENCOUNTER — MYC MEDICAL ADVICE (OUTPATIENT)
Dept: FAMILY MEDICINE | Facility: CLINIC | Age: 48
End: 2023-05-24
Payer: COMMERCIAL

## 2023-05-24 DIAGNOSIS — F41.9 ANXIETY: ICD-10-CM

## 2023-05-24 RX ORDER — HYDROXYZINE HYDROCHLORIDE 25 MG/1
12.5-5 TABLET, FILM COATED ORAL EVERY 6 HOURS PRN
Qty: 40 TABLET | Refills: 1 | Status: SHIPPED | OUTPATIENT
Start: 2023-05-24 | End: 2024-01-08

## 2023-05-24 NOTE — TELEPHONE ENCOUNTER
RN called [facility] pharmacy: Pharmacy staff stated they do not have a refill left.     RN routing to refill team for new script     Sarai Melgar RN on 5/24/2023 at 12:42 PM

## 2023-06-13 ENCOUNTER — MYC MEDICAL ADVICE (OUTPATIENT)
Dept: FAMILY MEDICINE | Facility: CLINIC | Age: 48
End: 2023-06-13
Payer: COMMERCIAL

## 2023-07-11 ENCOUNTER — MYC MEDICAL ADVICE (OUTPATIENT)
Dept: FAMILY MEDICINE | Facility: CLINIC | Age: 48
End: 2023-07-11
Payer: COMMERCIAL

## 2023-07-11 NOTE — TELEPHONE ENCOUNTER
RN spoke with [facility] pharmacist, Norma     amphetamine-dextroamphetamine (ADDERALL) 10 MG tablet Picked up May 18, 2023   amphetamine-dextroamphetamine (ADDERALL) 20 MG tablet Picked up May 16, 2023     (6/13/23 was early because he was going out of town)  amphetamine-dextroamphetamine (ADDERALL) 10 MG tablet  Last : 6/13 amphetamine-dextroamphetamine (ADDERALL) 20 MG tablet Last : 6/13/23       He can next  the amphetamine-dextroamphetamine (ADDERALL) 10 MG tablet on July 14th     He can  the amphetamine-dextroamphetamine (ADDERALL) 20 MG tablet on July 12 th.     Please advise.     Sarai Melgar RN on 7/11/2023 at 2:38 PM

## 2023-07-12 NOTE — TELEPHONE ENCOUNTER
Informed pt of provider's message below via [employer].    Kyra Gallegos RN on 7/12/2023 at 9:05 AM

## 2023-07-14 ENCOUNTER — TELEPHONE (OUTPATIENT)
Dept: FAMILY MEDICINE | Facility: CLINIC | Age: 48
End: 2023-07-14
Payer: COMMERCIAL

## 2023-07-14 ENCOUNTER — MYC MEDICAL ADVICE (OUTPATIENT)
Dept: FAMILY MEDICINE | Facility: CLINIC | Age: 48
End: 2023-07-14
Payer: COMMERCIAL

## 2023-07-14 NOTE — TELEPHONE ENCOUNTER
Pharmacy calling to state that they have flagged the medications below as a tier one Red Flag (pharmacy will have to fill out some paperwork, RN went through questions below with pharmacist):  -amphetamine-dextroamphetamine (ADDERALL) 20 MG tablet   -amphetamine-dextroamphetamine (ADDERALL) 10 MG tablet   -traMADol (ULTRAM) 50 MG tablet    Questions that were asked:  Why is he taking this medication aka Diagnosis? SI (sacroiliac) joint dysfunction [M53.3]  Cannot use last   Last seen pcp? October, 2022  How long has Blanche been pcp? Since 2018 per chart  Length of treatment? Unknown, pharmacist said we can say forever and have pcp change this at as she sees romy. So writer said forever as there is no end date provided on pt's file.    Kyra Gallegos RN on 7/14/2023 at 3:37 PM

## 2023-07-14 NOTE — TELEPHONE ENCOUNTER
I see provider note in other encounter, copied below, regarding adderall 10 and 20 mg:        Blanche Barrera PA-C     EK    7/12/23  7:01 AM  Note  Both prescriptions can be picked up on 7/14 since he starts them both on 7/16          I called [facility] to advise provider approved  today (Rx says fill on 7/16/23, so plan to give verbal approval for  today).    On hold for over 6 minutes, finally spoke to pharmacist who says they will get these ready for patient, noon or after for .    I routed message back to patient advising of this.    Jade Trevino RN  [facility]

## 2023-07-19 ENCOUNTER — MYC MEDICAL ADVICE (OUTPATIENT)
Dept: FAMILY MEDICINE | Facility: CLINIC | Age: 48
End: 2023-07-19

## 2023-07-19 ENCOUNTER — TELEPHONE (OUTPATIENT)
Dept: PALLIATIVE MEDICINE | Facility: CLINIC | Age: 48
End: 2023-07-19

## 2023-07-19 ENCOUNTER — VIRTUAL VISIT (OUTPATIENT)
Dept: FAMILY MEDICINE | Facility: CLINIC | Age: 48
End: 2023-07-19
Payer: COMMERCIAL

## 2023-07-19 DIAGNOSIS — M53.3 SI (SACROILIAC) JOINT DYSFUNCTION: Primary | ICD-10-CM

## 2023-07-19 DIAGNOSIS — M54.50 CHRONIC BILATERAL LOW BACK PAIN, UNSPECIFIED WHETHER SCIATICA PRESENT: Chronic | ICD-10-CM

## 2023-07-19 DIAGNOSIS — G89.29 CHRONIC BILATERAL LOW BACK PAIN, UNSPECIFIED WHETHER SCIATICA PRESENT: Chronic | ICD-10-CM

## 2023-07-19 PROCEDURE — 99212 OFFICE O/P EST SF 10 MIN: CPT | Mod: 95 | Performed by: PHYSICIAN ASSISTANT

## 2023-07-19 ASSESSMENT — PATIENT HEALTH QUESTIONNAIRE - PHQ9
SUM OF ALL RESPONSES TO PHQ QUESTIONS 1-9: 0
10. IF YOU CHECKED OFF ANY PROBLEMS, HOW DIFFICULT HAVE THESE PROBLEMS MADE IT FOR YOU TO DO YOUR WORK, TAKE CARE OF THINGS AT HOME, OR GET ALONG WITH OTHER PEOPLE: NOT DIFFICULT AT ALL
SUM OF ALL RESPONSES TO PHQ QUESTIONS 1-9: 0

## 2023-07-19 NOTE — TELEPHONE ENCOUNTER
Screening Questions for Radiology Injections:    Injection to be done at which interventional clinic site? [facility] [place]    Procedure ordered by Blanche Barrera PA-C    Procedure ordered? Bilateral SI Joint Injection     Transforaminal Cervical JAIME - Send to [facility]) - No [facility] providers perform this procedure    What insurance would patient like us to bill for this procedure? Preferred One/AETNA     IF SCHEDULING IN [place] PAIN OR SPINE PLEASE SCHEDULE AT LEAST 7-10 BUSINESS DAYS OUT SO A PA CAN BE OBTAINED    Worker's comp or MVA (motor vehicle accident) -Any injection DO NOT SCHEDULE and route to Jhonathan Grimes.      [employer] insurance - For SI joint injections, DO NOT SCHEDULE and route to Tami Flex.       ALL BCBS, Humana and [employer] CIGNA - DO NOT SCHEDULE and route to Tami Mccord    MEDICA- facet joint injections, route to [facility]    Is patient scheduled at [facility]? No    If YES, route every encounter to [facility]INE C[facility] CARE NAVIGATION POOL [[phone]]    Is an  needed? No     Patient has a  home? (Review Grid) YES: Informed     Any chance of pregnancy? Not Applicable   If YES, do NOT schedule and route to RN pool  - Dr. Rascon route to Cee Estevez and PM&R Nurse  [[id number]]      Is patient actively being treated for cancer or immunocompromised? No  If YES, do NOT schedule and route to RN pool/ Dr. Rascon's Team    Does the patient have a bleeding or clotting disorder? No     If YES, okay to schedule AND route to RN nurse magy/ Dr. Rascon's Team     (For any patients with platelet count <100, RN must forward to provider)    Is patient taking any Blood Thinners OR Antiplatelet medication?  No   If hold needed, do NOT schedule, route to RN pool/ Dr. Rascon's Team    Examples:   o Blood Thinners: (Coumadin, Warfarin, Jantoven, Pradaxa, Xarelto, Eliquis, Edoxaban, Enoxaparin, Lovenox, Heparin, Arixtra, Fondaparinux or  Fragmin)  o Antiplatelet Medications: (Plavix, Brilinta or Effient)     Is patient taking any aspirin products (includes Excedrin and Fiorinal)? No     If more than 325mg/day, OK to schedule; Instruct Pt to decrease to less than 325 mg for 7 days AND route to RN pool/ Dr. Rascon's Team     For CERVICAL procedures, hold all aspirin products for 6 days.     Tell Pt that if aspirin product is not held for 6 days, the procedure WILL BE cancelled.     Any allergies to contrast dye, iodine, shellfish, or numbing and steroid medications? No    If YES, schedule and add allergy information to appointment notes AND route to the RN pool/ Dr. Rascon's Team    If JAIME and Contrast Dye / Iodine Allergy? DO NOT SCHEDULE, route to RN pool/ Dr. Rascon's Team    Allergies: Penicillins and Ritalin [methylphenidate]     Does patient have an active infection or treated for one within the past week? No    Is patient currently taking any antibiotics or steroid medications?  No     For patients on chronic, preventative, or prophylactic antibiotics, procedures may be scheduled.     For patients on antibiotics for active or recent infection, schedule 4 days after completed.    For patients on steroid medications, schedule 4 days after completed.     Has the patient had a flu shot or any other vaccinations within the past 7 days? No  If yes, explain that for the vaccine to work best they need to:       wait 1 week before and 1 week after getting any Vaccine    wait 1 week before and 2 weeks after getting Covid Vaccine #2 or BOOSTER    If patient has concerns about the timing, send to RN pool/ Dr. Rascon's Team    Does patient have an MRI/CT?  Not Applicable Include Date and Check Procedure Scheduling Grid to see if required.    Was the MRI/CT done within the last 3 years?  NA     If no route to RN Pool/ Dr. Rascon's Team    If yes, where was the MRI/CT done?      Refer to PACS Transmissions list for approved external locations and route to RN  Pool High Priority/ Dr. Rascon's Team    If MRI was not done at approved external location do NOT schedule and route to RN pool/ Dr. Rascon's Team      If patient has an imaging disc, the injection MAY be scheduled but patient must bring disc to appt or appt will be cancelled.    Is patient able to transfer to a procedure table with minimal or no assistance? Yes     If no, do NOT schedule and route to RN Pool/ Dr. Rascon's Team    Procedure Specific Instructions:    If celiac plexus block, informed patient NPO for 6 hours and that it is okay to take medications with sips of water, especially blood pressure medications Not Applicable         If this is for a cervical procedure, informed patient that aspirin needs to be held for 6 days.   Not Applicable      Sedation, If Sedation is ordered for any procedure, patient must be NPO for 6 hours prior to procedure Not Applicable      If IV needed:    Do not schedule procedures requiring IV placement in the first appointment of the day or first appointment after lunch. Do NOT schedule at 0745, 0815 or 1245.       Instructed patient to arrive 30 minutes early for IV start if required. (Check Procedure Scheduling Grid)  Not Applicable    Reminders:    If you are started on any steroids or antibiotics between now and your appointment, you must contact us because the procedure may need to be cancelled.  Yes      As a reminder, receiving steroids can decrease your body's ability to fight infection.   Would you still like to move forward with scheduling the injection?  Yes      IV Sedation is not provided for procedures. If oral anti-anxiety medication is needed, the patient should request this from their referring provider.      Instruct patient to arrive as directed prior to the scheduled appointment time:  If IV needed 30 minutes before appointment time       For patients 85 or older we recommend having an adult stay w/ them for the remainder of the day.       If the patient  is Diabetic, remind them to bring their glucometer.      Does the patient have any questions?  NO  Ami Guerrero  [facility]

## 2023-07-19 NOTE — PROGRESS NOTES
"Abdias is a 47 year old who is being evaluated via a billable telephone visit.      What phone number would you like to be contacted at? [phone]  How would you like to obtain your AVS? Trevor    [place] Location (provider location):  On-site    Assessment & Plan       ICD-10-CM    1. SI (sacroiliac) joint dysfunction  M53.3       2. Chronic bilateral low back pain, unspecified whether sciatica present  M54.50     G89.29           1,2) FMLA reviewed and completed. Copy to scanning.       Provider  Link to [facility] Help Grid :[id number]}     BMI:   Estimated body mass index is 31.93 kg/m  as calculated from the following:    Height as of 10/10/22: 1.845 m (6' 0.64\").    Weight as of 10/10/22: 108.7 kg (239 lb 9.6 oz).     Return in about 3 months (around 10/19/2023) for a med check, with Blanche, in person.     Blanche Barrera PA-C  [facility] ENR[facility]EN Calero is a 47 year old, presenting for the following health issues:  Forms        7/19/2023     9:04 AM   Additional Questions   Roomed by MP   Accompanied by NA         7/19/2023     9:04 AM   Patient Reported Additional Medications   Patient reports taking the following new medications None per patient     History of Present Illness       Reason for visit:  Paper work    He eats 0-1 servings of fruits and vegetables daily.He consumes 1 sweetened beverage(s) daily.He exercises with enough effort to increase his heart rate 9 or less minutes per day.  He exercises with enough effort to increase his heart rate 5 days per week.   He is taking medications regularly.    Today's PHQ-9         PHQ-9 Total Score: 0    PHQ-9 Q9 Thoughts of better off dead/self-harm past 2 weeks :   Not at all    How difficult have these problems made it for you to do your work, take care of things at home, or get along with other people: Not difficult at all      Patient needs FMLA forms completed for SI joint. Fax to number on forms    Has repeat SI joint injections on " 8/3/23  Uses tramadol PRN, some days doesn't use any, but other days will take 2-3  Uses methocarbamol PRN, grogginess the next day    No changes needed to his FMLA  Fax number for Leilani is...[phone]      Pain History:  When did you first notice your pain? Years ago   Have you seen this provider for your pain in the past?   Yes   Where in your body do you have pain? SI joint-lower back  Are you seeing anyone else for your pain? Yes - pain clinic        9/27/2021     1:42 PM 7/14/2022     8:41 AM 7/19/2023     8:16 AM   PHQ-9 SCORE   PHQ-9 Total Score MyChart  2 (Minimal depression) 0   PHQ-9 Total Score 3 2 0           3/15/2021     1:50 PM 9/27/2021     1:42 PM 5/5/2023     8:44 AM   ARIEL-7 SCORE   Total Score   12 (moderate anxiety)   Total Score 2 1 12         PDMP Review     None        Last CSA Agreement:   CSA -- Patient Level:    CSA: None found at the patient level.       Last UDS: 11/29/2022      Review of Systems   Constitutional, musculoskeletal systems are negative, except as otherwise noted.      Objective           Vitals:  No vitals were obtained today due to virtual visit.    Physical Exam   healthy, alert and no distress  PSYCH: Alert and oriented times 3; coherent speech, normal   rate and volume, able to articulate logical thoughts, able   to abstract reason, no tangential thoughts, no hallucinations   or delusions  His affect is normal and pleasant  RESP: No cough, no audible wheezing, able to talk in full sentences  Remainder of exam unable to be completed due to telephone visits          Phone call duration: 14 minutes

## 2023-07-24 ENCOUNTER — ANCILLARY ORDERS (OUTPATIENT)
Dept: PALLIATIVE MEDICINE | Facility: CLINIC | Age: 48
End: 2023-07-24

## 2023-07-24 DIAGNOSIS — M53.3 SI (SACROILIAC) JOINT DYSFUNCTION: ICD-10-CM

## 2023-07-27 ENCOUNTER — TELEPHONE (OUTPATIENT)
Dept: PALLIATIVE MEDICINE | Facility: CLINIC | Age: 48
End: 2023-07-27
Payer: COMMERCIAL

## 2023-07-27 NOTE — TELEPHONE ENCOUNTER
[facility] Call Center    Phone Message    May a detailed message be left on voicemail: yes     Reason for Call:   Doug Galicia would like to cancel the following appointments:     Philip Liao MD in [facility] ([id number]), 8/3/2023 12:45 PM  CLAU in [facility] ([id number]), 8/3/2023 12:45 PM     Comments:  Have never had a copay to have this done. I don't currently have the funds needed I will have to rechedule at a leter time.    Action Taken: Other: Routed to Pain Injection Schedulers    Travel Screening: Not Applicable

## 2023-08-04 ENCOUNTER — MYC REFILL (OUTPATIENT)
Dept: FAMILY MEDICINE | Facility: CLINIC | Age: 48
End: 2023-08-04
Payer: COMMERCIAL

## 2023-08-04 DIAGNOSIS — G89.29 CHRONIC BILATERAL LOW BACK PAIN, UNSPECIFIED WHETHER SCIATICA PRESENT: ICD-10-CM

## 2023-08-04 DIAGNOSIS — G89.4 CHRONIC PAIN SYNDROME: Chronic | ICD-10-CM

## 2023-08-04 DIAGNOSIS — M53.3 SI (SACROILIAC) JOINT DYSFUNCTION: ICD-10-CM

## 2023-08-04 DIAGNOSIS — M54.50 CHRONIC BILATERAL LOW BACK PAIN, UNSPECIFIED WHETHER SCIATICA PRESENT: ICD-10-CM

## 2023-08-07 RX ORDER — TRAMADOL HYDROCHLORIDE 50 MG/1
50-100 TABLET ORAL EVERY 6 HOURS PRN
Qty: 90 TABLET | Refills: 0 | Status: SHIPPED | OUTPATIENT
Start: 2023-08-07 | End: 2023-11-06

## 2023-08-17 ENCOUNTER — MYC REFILL (OUTPATIENT)
Dept: FAMILY MEDICINE | Facility: CLINIC | Age: 48
End: 2023-08-17
Payer: COMMERCIAL

## 2023-08-17 DIAGNOSIS — K21.9 GASTROESOPHAGEAL REFLUX DISEASE, UNSPECIFIED WHETHER ESOPHAGITIS PRESENT: ICD-10-CM

## 2023-08-17 RX ORDER — NICOTINE POLACRILEX 4 MG/1
20-40 GUM, CHEWING ORAL DAILY
Qty: 180 TABLET | Refills: 0 | Status: SHIPPED | OUTPATIENT
Start: 2023-08-17 | End: 2023-11-16

## 2023-09-01 ENCOUNTER — MYC MEDICAL ADVICE (OUTPATIENT)
Dept: FAMILY MEDICINE | Facility: CLINIC | Age: 48
End: 2023-09-01
Payer: COMMERCIAL

## 2023-09-01 DIAGNOSIS — F90.9 ATTENTION DEFICIT HYPERACTIVITY DISORDER (ADHD), UNSPECIFIED ADHD TYPE: ICD-10-CM

## 2023-09-01 RX ORDER — DEXTROAMPHETAMINE SACCHARATE, AMPHETAMINE ASPARTATE, DEXTROAMPHETAMINE SULFATE AND AMPHETAMINE SULFATE 5; 5; 5; 5 MG/1; MG/1; MG/1; MG/1
20 TABLET ORAL DAILY
Qty: 30 TABLET | Refills: 0 | Status: SHIPPED | OUTPATIENT
Start: 2023-09-01 | End: 2023-09-25

## 2023-09-01 RX ORDER — DEXTROAMPHETAMINE SACCHARATE, AMPHETAMINE ASPARTATE, DEXTROAMPHETAMINE SULFATE AND AMPHETAMINE SULFATE 5; 5; 5; 5 MG/1; MG/1; MG/1; MG/1
20 TABLET ORAL DAILY
Qty: 30 TABLET | Refills: 0 | Status: SHIPPED | OUTPATIENT
Start: 2023-09-04 | End: 2023-09-01

## 2023-09-01 RX ORDER — DEXTROAMPHETAMINE SACCHARATE, AMPHETAMINE ASPARTATE, DEXTROAMPHETAMINE SULFATE AND AMPHETAMINE SULFATE 2.5; 2.5; 2.5; 2.5 MG/1; MG/1; MG/1; MG/1
10 TABLET ORAL DAILY
Qty: 30 TABLET | Refills: 0 | Status: SHIPPED | OUTPATIENT
Start: 2023-09-01 | End: 2023-09-25

## 2023-09-01 RX ORDER — DEXTROAMPHETAMINE SACCHARATE, AMPHETAMINE ASPARTATE, DEXTROAMPHETAMINE SULFATE AND AMPHETAMINE SULFATE 2.5; 2.5; 2.5; 2.5 MG/1; MG/1; MG/1; MG/1
10 TABLET ORAL DAILY
Qty: 30 TABLET | Refills: 0 | Status: SHIPPED | OUTPATIENT
Start: 2023-09-04 | End: 2023-09-01

## 2023-09-01 NOTE — TELEPHONE ENCOUNTER
I reviewed  website- prescription pattern NOT consistent with potential divergence and IS consistent with our available records  Ok'd early refill  Norma Murdock PA-C

## 2023-09-19 ENCOUNTER — ANCILLARY ORDERS (OUTPATIENT)
Dept: PALLIATIVE MEDICINE | Facility: CLINIC | Age: 48
End: 2023-09-19

## 2023-09-19 DIAGNOSIS — M53.3 SI (SACROILIAC) JOINT DYSFUNCTION: Primary | ICD-10-CM

## 2023-09-25 ENCOUNTER — E-VISIT (OUTPATIENT)
Dept: FAMILY MEDICINE | Facility: CLINIC | Age: 48
End: 2023-09-25
Payer: COMMERCIAL

## 2023-09-25 DIAGNOSIS — F90.9 ATTENTION DEFICIT HYPERACTIVITY DISORDER (ADHD), UNSPECIFIED ADHD TYPE: ICD-10-CM

## 2023-09-25 PROCEDURE — 99421 OL DIG E/M SVC 5-10 MIN: CPT | Performed by: PHYSICIAN ASSISTANT

## 2023-09-25 RX ORDER — DEXTROAMPHETAMINE SACCHARATE, AMPHETAMINE ASPARTATE, DEXTROAMPHETAMINE SULFATE AND AMPHETAMINE SULFATE 1.25; 1.25; 1.25; 1.25 MG/1; MG/1; MG/1; MG/1
5 TABLET ORAL DAILY
Qty: 30 TABLET | Refills: 0 | Status: SHIPPED | OUTPATIENT
Start: 2023-09-25 | End: 2023-10-20

## 2023-09-25 RX ORDER — DEXTROAMPHETAMINE SACCHARATE, AMPHETAMINE ASPARTATE, DEXTROAMPHETAMINE SULFATE AND AMPHETAMINE SULFATE 3.75; 3.75; 3.75; 3.75 MG/1; MG/1; MG/1; MG/1
15 TABLET ORAL DAILY
Qty: 30 TABLET | Refills: 0 | Status: SHIPPED | OUTPATIENT
Start: 2023-09-25 | End: 2023-10-02

## 2023-09-25 ASSESSMENT — ENCOUNTER SYMPTOMS
NERVOUS/ANXIOUS: 0
SHORTNESS OF BREATH: 0
DIZZINESS: 0
COUGH: 0
CONSTIPATION: 0
FREQUENCY: 0
NAUSEA: 0
HEMATURIA: 0
ABDOMINAL PAIN: 0
CHILLS: 0
PARESTHESIAS: 0
DIARRHEA: 0
EYE PAIN: 0
WEAKNESS: 0
HEADACHES: 0
MYALGIAS: 0
ARTHRALGIAS: 0
DYSURIA: 0
PALPITATIONS: 0
FEVER: 0
JOINT SWELLING: 0
SORE THROAT: 0
HEMATOCHEZIA: 0
HEARTBURN: 0

## 2023-10-02 ENCOUNTER — OFFICE VISIT (OUTPATIENT)
Dept: FAMILY MEDICINE | Facility: CLINIC | Age: 48
End: 2023-10-02
Payer: COMMERCIAL

## 2023-10-02 VITALS
BODY MASS INDEX: 32.6 KG/M2 | TEMPERATURE: 97.4 F | WEIGHT: 254 LBS | HEIGHT: 74 IN | DIASTOLIC BLOOD PRESSURE: 86 MMHG | OXYGEN SATURATION: 95 % | SYSTOLIC BLOOD PRESSURE: 124 MMHG | RESPIRATION RATE: 16 BRPM | HEART RATE: 112 BPM

## 2023-10-02 DIAGNOSIS — M54.50 CHRONIC BILATERAL LOW BACK PAIN, UNSPECIFIED WHETHER SCIATICA PRESENT: Chronic | ICD-10-CM

## 2023-10-02 DIAGNOSIS — G89.29 CHRONIC BILATERAL LOW BACK PAIN, UNSPECIFIED WHETHER SCIATICA PRESENT: Chronic | ICD-10-CM

## 2023-10-02 DIAGNOSIS — Z00.01 ENCOUNTER FOR ROUTINE ADULT MEDICAL EXAM WITH ABNORMAL FINDINGS: Primary | ICD-10-CM

## 2023-10-02 DIAGNOSIS — G47.09 OTHER INSOMNIA: ICD-10-CM

## 2023-10-02 DIAGNOSIS — Z13.1 DIABETES MELLITUS SCREENING: ICD-10-CM

## 2023-10-02 DIAGNOSIS — F90.9 ATTENTION DEFICIT HYPERACTIVITY DISORDER (ADHD), UNSPECIFIED ADHD TYPE: ICD-10-CM

## 2023-10-02 DIAGNOSIS — Z12.11 SCREEN FOR COLON CANCER: ICD-10-CM

## 2023-10-02 LAB — HBA1C MFR BLD: 5.6 % (ref 0–5.6)

## 2023-10-02 PROCEDURE — 99396 PREV VISIT EST AGE 40-64: CPT | Mod: 25 | Performed by: PHYSICIAN ASSISTANT

## 2023-10-02 PROCEDURE — 90471 IMMUNIZATION ADMIN: CPT | Performed by: PHYSICIAN ASSISTANT

## 2023-10-02 PROCEDURE — 90686 IIV4 VACC NO PRSV 0.5 ML IM: CPT | Performed by: PHYSICIAN ASSISTANT

## 2023-10-02 PROCEDURE — 99213 OFFICE O/P EST LOW 20 MIN: CPT | Mod: 25 | Performed by: PHYSICIAN ASSISTANT

## 2023-10-02 PROCEDURE — 83036 HEMOGLOBIN GLYCOSYLATED A1C: CPT | Performed by: PHYSICIAN ASSISTANT

## 2023-10-02 PROCEDURE — 36415 COLL VENOUS BLD VENIPUNCTURE: CPT | Performed by: PHYSICIAN ASSISTANT

## 2023-10-02 RX ORDER — DEXTROAMPHETAMINE SACCHARATE, AMPHETAMINE ASPARTATE, DEXTROAMPHETAMINE SULFATE AND AMPHETAMINE SULFATE 5; 5; 5; 5 MG/1; MG/1; MG/1; MG/1
20 TABLET ORAL DAILY
Qty: 30 TABLET | Refills: 0 | Status: SHIPPED | OUTPATIENT
Start: 2023-10-23 | End: 2024-01-09

## 2023-10-02 RX ORDER — ZOLPIDEM TARTRATE 10 MG/1
TABLET ORAL
Qty: 30 TABLET | Refills: 5 | Status: SHIPPED | OUTPATIENT
Start: 2023-11-01 | End: 2023-10-10

## 2023-10-02 RX ORDER — DEXTROAMPHETAMINE SACCHARATE, AMPHETAMINE ASPARTATE, DEXTROAMPHETAMINE SULFATE AND AMPHETAMINE SULFATE 5; 5; 5; 5 MG/1; MG/1; MG/1; MG/1
20 TABLET ORAL DAILY
Qty: 30 TABLET | Refills: 0 | Status: SHIPPED | OUTPATIENT
Start: 2023-11-22 | End: 2024-01-09

## 2023-10-02 RX ORDER — DEXTROAMPHETAMINE SACCHARATE, AMPHETAMINE ASPARTATE, DEXTROAMPHETAMINE SULFATE AND AMPHETAMINE SULFATE 2.5; 2.5; 2.5; 2.5 MG/1; MG/1; MG/1; MG/1
5 TABLET ORAL DAILY
Qty: 12 TABLET | Refills: 0 | Status: SHIPPED | OUTPATIENT
Start: 2023-10-02 | End: 2024-01-09

## 2023-10-02 RX ORDER — DEXTROAMPHETAMINE SACCHARATE, AMPHETAMINE ASPARTATE, DEXTROAMPHETAMINE SULFATE AND AMPHETAMINE SULFATE 5; 5; 5; 5 MG/1; MG/1; MG/1; MG/1
20 TABLET ORAL DAILY
Qty: 30 TABLET | Refills: 0 | Status: SHIPPED | OUTPATIENT
Start: 2023-12-22 | End: 2024-01-09

## 2023-10-02 ASSESSMENT — ENCOUNTER SYMPTOMS
HEMATOCHEZIA: 0
FREQUENCY: 0
HEADACHES: 0
SORE THROAT: 0
DIARRHEA: 0
CHILLS: 0
NAUSEA: 0
SHORTNESS OF BREATH: 0
NERVOUS/ANXIOUS: 0
WEAKNESS: 0
MYALGIAS: 0
DYSURIA: 0
ABDOMINAL PAIN: 0
JOINT SWELLING: 0
HEMATURIA: 0
HEARTBURN: 0
PALPITATIONS: 0
FEVER: 0
DIZZINESS: 0
CONSTIPATION: 0
COUGH: 0
ARTHRALGIAS: 0
EYE PAIN: 0
PARESTHESIAS: 0

## 2023-10-02 NOTE — PROGRESS NOTES
SUBJECTIVE:   CC: Abdias is an 47 year old who presents for preventative health visit.       10/2/2023    10:08 AM   Additional Questions   Roomed by Haley       Healthy Habits:     Getting at least 3 servings of Calcium per day:  NO    Bi-annual eye exam:  Yes    Dental care twice a year:  NO    Sleep apnea or symptoms of sleep apnea:  Daytime drowsiness    Diet:  Regular (no restrictions)    Frequency of exercise:  2-3 days/week    Duration of exercise:  30-45 minutes    Taking medications regularly:  Yes    Medication side effects:  Not applicable    Additional concerns today:  No      The 10-year ASCVD risk score (Matthew DAMON, et al., 2019) is: 2%    Values used to calculate the score:      Age: 47 years      Sex: Male      Is Non- : No      Diabetic: No      Tobacco smoker: No      Systolic Blood Pressure: 124 mmHg      Is BP treated: No      HDL Cholesterol: 44 mg/dL      Total Cholesterol: 188 mg/dL     Please answer the questions below, rating yourself on each of the criteria shown using the scale on the right side of the page. As you answer each question, place an X in the box that best describes how you have felt and conducted yourself over the past 6 months.     Never Rarely Sometimes Often Very Often   1 How often do you have trouble wrapping up the final details of a project once the challenging parts have been done? x       2 How often do you have difficulty getting things in order when you have to do a task that requires organization? x       3 How often do you have problems remembering appointments or obligations? x       4 When you have a task that requires a lot of thought, how often do you avoid or delay getting started? x       5 How often do you fidget or squirm with your hands or feet when you have to sit down for a long time? x       6 How often do you feel overly active and compelled to do things, like you were driven by a motor? x       7 How often do you make careless  mistakes when you have to work on a boring or difficult project? x       8 How often do you have difficulty keeping your attention when you are doing boring or repetitive work? x       9 How often do you have difficulty concentrating on what people say to you, even when they are speaking to you directly? x       10 How often do you misplace or have difficulty finding things at home or at work? x       11 How often are you distracted by activity or noise around you? x       12 How often do you leave your seat in meetings or other situations in which you are expected to remain seated? x       13 How often do you feel restless or fidgety? x       14 How often do you have difficulty unwinding and relaxing when you have time to yourself?  x      15 How often do you find yourself talking too much when you are in social situations? x       16 When you're in a conversation, how often do you find yourself finishing the sentences of the people you are talking to, before they can finish them themselves? x       17 How often do you have difficulty waiting your turn in situations when turn-taking is required? x       18 How often do you interrupt others when they are busy?  x          Pain History:  When did you first notice your pain? Generally 14 yrs ago    Have you seen this provider for your pain in the past? Yes   Where in your body do you have pain? Lower back   Are you seeing anyone else for your pain? Yes - physical therapist      ADHD and insomnia follow up  Decreasing the 20mg dose to 15mg didn't go well - would like to go back to 20mg   Decreasing the 10mg down to 5mg worked well, would like to continue  Ambien 10mg nightly  Tramadol used PRN - low back pain      Social History     Tobacco Use    Smoking status: Former     Types: Dip, chew, snus or snuff     Passive exposure: Never    Smokeless tobacco: Current     Types: Chew    Tobacco comments:     Would LOVE to try any scrip I can get to aid in not using using  "smokless tabacco   Substance Use Topics    Alcohol use: No             9/25/2023     7:15 AM   Alcohol Use   Prescreen: >3 drinks/day or >7 drinks/week? Not Applicable       Last PSA: No results found for: PSA    Reviewed orders with patient. Reviewed health maintenance and updated orders accordingly - Yes  Lab work is in process  Labs reviewed in EPIC    Reviewed and updated as needed this visit by clinical staff   Tobacco  Allergies               Reviewed and updated as needed this visit by Provider                 Past Medical History:   Diagnosis Date    Mild depression     situation related to mother dying         Review of Systems   Constitutional:  Negative for chills and fever.   HENT:  Negative for congestion, ear pain, hearing loss and sore throat.    Eyes:  Negative for pain and visual disturbance.   Respiratory:  Negative for cough and shortness of breath.    Cardiovascular:  Negative for chest pain, palpitations and peripheral edema.   Gastrointestinal:  Negative for abdominal pain, constipation, diarrhea, heartburn, hematochezia and nausea.   Genitourinary:  Negative for dysuria, frequency, genital sores, hematuria, impotence, penile discharge and urgency.   Musculoskeletal:  Negative for arthralgias, joint swelling and myalgias.   Skin:  Negative for rash.   Neurological:  Negative for dizziness, weakness, headaches and paresthesias.   Psychiatric/Behavioral:  Negative for mood changes. The patient is not nervous/anxious.      OBJECTIVE:   /86   Pulse 112   Temp 97.4  F (36.3  C)   Resp 16   Ht 1.88 m (6' 2\")   Wt 115.2 kg (254 lb)   SpO2 95%   BMI 32.61 kg/m      Physical Exam  GENERAL: healthy, alert and no distress  EYES: Eyes grossly normal to inspection  HENT: ear canals and TM's normal, nose and mouth without ulcers or lesions  NECK: no adenopathy, no asymmetry, masses, or scars and thyroid normal to palpation  RESP: lungs clear to auscultation - no rales, rhonchi or wheezes  CV: " "regular rates and rhythm, normal S1 S2, no S3 or S4, and no murmur, click or rub  ABDOMEN: soft, nontender, no hepatosplenomegaly, no masses and bowel sounds normal  MS: no gross musculoskeletal defects noted, no edema  SKIN: no suspicious lesions or rashes  NEURO: Normal strength and tone, mentation intact and speech normal  PSYCH: mentation appears normal, affect normal/bright    ASSESSMENT/PLAN:       ICD-10-CM    1. Encounter for routine adult medical exam with abnormal findings  Z00.01       2. Screen for colon cancer  Z12.11 Colonoscopy Screening  Referral      3. Diabetes mellitus screening  Z13.1 Hemoglobin A1c     Hemoglobin A1c      4. Attention deficit hyperactivity disorder (ADHD), unspecified ADHD type  F90.9 amphetamine-dextroamphetamine (ADDERALL) 10 MG tablet     amphetamine-dextroamphetamine (ADDERALL) 20 MG tablet     amphetamine-dextroamphetamine (ADDERALL) 20 MG tablet     amphetamine-dextroamphetamine (ADDERALL) 20 MG tablet      5. Other insomnia  G47.09 zolpidem (AMBIEN) 10 MG tablet      6. Chronic bilateral low back pain, unspecified whether sciatica present  M54.50     G89.29           1-3) Screenings discussed    4) Will increase his morning dose back to 20mg. Continue afternoon dose of 5mg. Follow up e-visit in 6 months    5,6) No med/dose changes today. Follow up e-visit in 6 months for refills      COUNSELING:   Reviewed preventive health counseling, as reflected in patient instructions      BMI:   Estimated body mass index is 32.61 kg/m  as calculated from the following:    Height as of this encounter: 1.88 m (6' 2\").    Weight as of this encounter: 115.2 kg (254 lb).     He reports that he has quit smoking. His smoking use included dip, chew, snus or snuff. He has never been exposed to tobacco smoke. His smokeless tobacco use includes chew.            Blanche Barrera PA-C  [facility]  "

## 2023-10-02 NOTE — PROGRESS NOTES
{PROVIDER CHARTING PREFERENCE:162138}    Kimberly Calero is a 47 year old, presenting for the following health issues:  MELA        10/2/2023    10:08 AM   Additional Questions   Roomed by Haley PLAZA  Pertinent negatives include no abdominal pain, arthralgias, chest pain, chills, congestion, coughing, fever, headaches, joint swelling, myalgias, nausea, rash, sore throat or weakness.   Healthy Habits:     Getting at least 3 servings of Calcium per day:  NO    Bi-annual eye exam:  Yes    Dental care twice a year:  NO    Sleep apnea or symptoms of sleep apnea:  Daytime drowsiness    Diet:  Regular (no restrictions)    Frequency of exercise:  2-3 days/week    Duration of exercise:  30-45 minutes    Taking medications regularly:  Yes    Medication side effects:  Not applicable    Additional concerns today:  No     Please answer the questions below, rating yourself on each of the criteria shown using the scale on the right side of the page. As you answer each question, place an X in the box that best describes how you have felt and conducted yourself over the past 6 months.     Never Rarely Sometimes Often Very Often   1 How often do you have trouble wrapping up the final details of a project once the challenging parts have been done? x       2 How often do you have difficulty getting things in order when you have to do a task that requires organization? x       3 How often do you have problems remembering appointments or obligations? x       4 When you have a task that requires a lot of thought, how often do you avoid or delay getting started? x       5 How often do you fidget or squirm with your hands or feet when you have to sit down for a long time? x       6 How often do you feel overly active and compelled to do things, like you were driven by a motor? x       7 How often do you make careless mistakes when you have to work on a boring or difficult project? x       8 How often do you have difficulty  keeping your attention when you are doing boring or repetitive work? x       9 How often do you have difficulty concentrating on what people say to you, even when they are speaking to you directly? x       10 How often do you misplace or have difficulty finding things at home or at work? x       11 How often are you distracted by activity or noise around you? x       12 How often do you leave your seat in meetings or other situations in which you are expected to remain seated? x       13 How often do you feel restless or fidgety? x       14 How often do you have difficulty unwinding and relaxing when you have time to yourself?  x      15 How often do you find yourself talking too much when you are in social situations? x       16 When you're in a conversation, how often do you find yourself finishing the sentences of the people you are talking to, before they can finish them themselves? x       17 How often do you have difficulty waiting your turn in situations when turn-taking is required? x       18 How often do you interrupt others when they are busy?  x            Pain History:  When did you first notice your pain? Generally 14 yrs ago    Have you seen this provider for your pain in the past? Yes   Where in your body do you have pain? Lower back   Are you seeing anyone else for your pain? Yes - physical therapist        9/27/2021     1:42 PM 7/14/2022     8:41 AM 7/19/2023     8:16 AM   PHQ-9 SCORE   PHQ-9 Total Score MyChart  2 (Minimal depression) 0   PHQ-9 Total Score 3 2 0           3/15/2021     1:50 PM 9/27/2021     1:42 PM 5/5/2023     8:44 AM   ARIEL-7 SCORE   Total Score   12 (moderate anxiety)   Total Score 2 1 12       {Rooming staff  Please complete the PEG Assessment  Assess Pain, Function, and Quality of Life. Complete every pain visit :638754}    {After completing assessments, pull in flowsheet scores (Optional) :640656}    Chronic Pain Follow Up:    Location of pain: ***  Analgesia/pain control:     - Recent changes:  ***    - Overall control: {Pain control level :466683}    - Current treatments: ***   Adherence:     - Do you ever take more pain medicine than prescribed? {Yes ***/No :306297}    - When did you take your last dose of pain medicine?  ***   Adverse effects: {Yes ***/No :515033}   PDMP Review         Value Time User    State PDMP site checked  Yes 9/1/2023  9:29 AM Norma Murdock PA-C          Last CSA Agreement:   CSA -- Patient Level:    CSA: None found at the patient level.       Last UDS: 11/29/2022  {If newly prescribing or considering opioid therapy, the following assessments must be completed :008607}  {Pull in ORT  RIOSORD results (Optional) :086859}    {Provider  Link to Pain Management SmartSet  Includes non-opioid pharmacological medications and referrals  :877334}  {additonal problems for provider to add (Optional):692345}      Review of Systems   Constitutional:  Negative for chills and fever.   HENT:  Negative for congestion, ear pain, hearing loss and sore throat.    Eyes:  Negative for pain and visual disturbance.   Respiratory:  Negative for cough and shortness of breath.    Cardiovascular:  Negative for chest pain, palpitations and peripheral edema.   Gastrointestinal:  Negative for abdominal pain, constipation, diarrhea, heartburn, hematochezia and nausea.   Genitourinary:  Negative for dysuria, frequency, genital sores, hematuria, impotence, penile discharge and urgency.   Musculoskeletal:  Negative for arthralgias, joint swelling and myalgias.   Skin:  Negative for rash.   Neurological:  Negative for dizziness, weakness, headaches and paresthesias.   Psychiatric/Behavioral:  Negative for mood changes. The patient is not nervous/anxious.       {ROS COMP (Optional):017049}      Objective    There were no vitals taken for this visit.  There is no height or weight on file to calculate BMI.  Physical Exam   {Exam List (Optional):984047}    {Diagnostic Test Results  (Optional):145406}    {AMBULATORY ATTESTATION (Optional):667030}

## 2023-10-09 ENCOUNTER — MYC MEDICAL ADVICE (OUTPATIENT)
Dept: FAMILY MEDICINE | Facility: CLINIC | Age: 48
End: 2023-10-09
Payer: COMMERCIAL

## 2023-10-09 DIAGNOSIS — G47.09 OTHER INSOMNIA: ICD-10-CM

## 2023-10-10 RX ORDER — ZOLPIDEM TARTRATE 10 MG/1
TABLET ORAL
Qty: 30 TABLET | Refills: 5 | OUTPATIENT
Start: 2023-11-01

## 2023-10-10 RX ORDER — ZOLPIDEM TARTRATE 10 MG/1
TABLET ORAL
Qty: 30 TABLET | Refills: 5 | Status: SHIPPED | OUTPATIENT
Start: 2023-10-15 | End: 2024-03-21

## 2023-10-10 NOTE — TELEPHONE ENCOUNTER
I called and spoke to Melani, initial fill of May Rx was picked up 5/4, refilled 5 times (5/31, 6/29, 7/26, 8/22, 9/18) so no refills left on the May Rx.    Per Melani, he would be eligible to  another 30 day supply of ambien on 10/15; however, the new Rx has start date of 11/1 so patient will be out of med for a couple of weeks.      Routed back to PCP to either: send new Rx without a start date (and [facility] will dispense on 10/15 per policy) or give verbal approval for refill on 10/15.    Routed to PCP to address.    Jade SOLO RN  [facility] Triage

## 2023-10-10 NOTE — TELEPHONE ENCOUNTER
Please check with [facility] - his last prescription should've had a total of 6 fills (initial fill plus 5 refills)  Did they dispense 6 months worth already?

## 2023-10-10 NOTE — TELEPHONE ENCOUNTER
THE PRESCRIPTION IS WRITTEN FOR 30 TABS (1 MONTH) WITH 5 REFILLS. SO A TOTAL OF 6 MONTHS OR 6 FILLS

## 2023-10-31 PROBLEM — F11.90 CHRONIC, CONTINUOUS USE OF OPIOIDS: Status: ACTIVE | Noted: 2023-10-31

## 2023-11-01 ENCOUNTER — E-VISIT (OUTPATIENT)
Dept: FAMILY MEDICINE | Facility: CLINIC | Age: 48
End: 2023-11-01
Payer: COMMERCIAL

## 2023-11-01 DIAGNOSIS — Z87.891 PERSONAL HISTORY OF TOBACCO USE, PRESENTING HAZARDS TO HEALTH: Primary | ICD-10-CM

## 2023-11-01 PROCEDURE — 99421 OL DIG E/M SVC 5-10 MIN: CPT | Performed by: PHYSICIAN ASSISTANT

## 2023-11-01 RX ORDER — VARENICLINE TARTRATE 0.5 (11)-1
KIT ORAL
Qty: 53 TABLET | Refills: 0 | Status: SHIPPED | OUTPATIENT
Start: 2023-11-01 | End: 2024-04-08

## 2023-11-01 RX ORDER — VARENICLINE TARTRATE 1 MG/1
1 TABLET, FILM COATED ORAL 2 TIMES DAILY
Qty: 60 TABLET | Refills: 1 | Status: SHIPPED | OUTPATIENT
Start: 2023-11-01 | End: 2024-01-04

## 2023-11-16 ENCOUNTER — MYC REFILL (OUTPATIENT)
Dept: FAMILY MEDICINE | Facility: CLINIC | Age: 48
End: 2023-11-16
Payer: COMMERCIAL

## 2023-11-16 DIAGNOSIS — K21.9 GASTROESOPHAGEAL REFLUX DISEASE, UNSPECIFIED WHETHER ESOPHAGITIS PRESENT: ICD-10-CM

## 2023-11-16 RX ORDER — NICOTINE POLACRILEX 4 MG/1
20-40 GUM, CHEWING ORAL DAILY
Qty: 180 TABLET | Refills: 1 | Status: SHIPPED | OUTPATIENT
Start: 2023-11-16 | End: 2024-04-08

## 2023-12-15 ENCOUNTER — MYC MEDICAL ADVICE (OUTPATIENT)
Dept: FAMILY MEDICINE | Facility: CLINIC | Age: 48
End: 2023-12-15

## 2023-12-15 ENCOUNTER — E-VISIT (OUTPATIENT)
Dept: FAMILY MEDICINE | Facility: CLINIC | Age: 48
End: 2023-12-15
Payer: COMMERCIAL

## 2023-12-15 DIAGNOSIS — M54.16 LUMBAR RADICULOPATHY: Primary | ICD-10-CM

## 2023-12-15 PROCEDURE — 99421 OL DIG E/M SVC 5-10 MIN: CPT | Performed by: PHYSICIAN ASSISTANT

## 2023-12-15 RX ORDER — PREDNISONE 20 MG/1
TABLET ORAL
Qty: 11 TABLET | Refills: 0 | Status: SHIPPED | OUTPATIENT
Start: 2023-12-15 | End: 2024-09-29

## 2023-12-15 NOTE — TELEPHONE ENCOUNTER
Medication request in other encounter. RN closing this encounter.     Sarai Melgar RN on 12/15/2023 at 1:37 PM

## 2023-12-29 ENCOUNTER — MYC REFILL (OUTPATIENT)
Dept: FAMILY MEDICINE | Facility: CLINIC | Age: 48
End: 2023-12-29
Payer: COMMERCIAL

## 2023-12-29 DIAGNOSIS — M54.16 LUMBAR RADICULOPATHY: ICD-10-CM

## 2023-12-29 RX ORDER — PREDNISONE 20 MG/1
TABLET ORAL
Qty: 11 TABLET | Refills: 0 | Status: CANCELLED | OUTPATIENT
Start: 2023-12-29

## 2024-01-08 ENCOUNTER — MYC REFILL (OUTPATIENT)
Dept: FAMILY MEDICINE | Facility: CLINIC | Age: 49
End: 2024-01-08
Payer: COMMERCIAL

## 2024-01-08 DIAGNOSIS — F41.9 ANXIETY: ICD-10-CM

## 2024-01-08 RX ORDER — HYDROXYZINE HYDROCHLORIDE 25 MG/1
12.5-5 TABLET, FILM COATED ORAL EVERY 6 HOURS PRN
Qty: 40 TABLET | Refills: 1 | Status: SHIPPED | OUTPATIENT
Start: 2024-01-08 | End: 2024-10-07

## 2024-01-08 NOTE — TELEPHONE ENCOUNTER
This screening is from 7/19/2023.  Patient has been scheduled for 1/10/2024 and I do not see insurance coverage for this injection as having been checked.     Please reach out to patient to re-screen and check insurance.     Thank you.     Holley Britt RN  [facility]  [phone]

## 2024-01-09 NOTE — TELEPHONE ENCOUNTER
Per the appointment notes: **Let patient know we are not contracted with [employer] and OOP will be higher**       Routed back to insurance persons.  Is pt aware?  Has he been called?  Is a waiver form needed? What is the estimated cost?        Karlie RN-BSN  [facility] Pain Management [facility]   [phone]

## 2024-02-24 ENCOUNTER — MYC REFILL (OUTPATIENT)
Dept: FAMILY MEDICINE | Facility: CLINIC | Age: 49
End: 2024-02-24
Payer: COMMERCIAL

## 2024-02-24 DIAGNOSIS — Z87.891 PERSONAL HISTORY OF TOBACCO USE, PRESENTING HAZARDS TO HEALTH: ICD-10-CM

## 2024-02-26 RX ORDER — VARENICLINE TARTRATE 1 MG/1
1 TABLET, FILM COATED ORAL 2 TIMES DAILY
Qty: 60 TABLET | Refills: 0 | Status: SHIPPED | OUTPATIENT
Start: 2024-02-26 | End: 2024-03-28

## 2024-03-27 ENCOUNTER — TELEPHONE (OUTPATIENT)
Dept: FAMILY MEDICINE | Facility: CLINIC | Age: 49
End: 2024-03-27
Payer: COMMERCIAL

## 2024-03-27 NOTE — TELEPHONE ENCOUNTER
Forms received from: [employer]   Phone number listed: na   Fax listed: na  Date received: 3/27/24  Form description: [place] paperwork  Once forms are completed, please return to [employer] via na.  Is patient requesting to be contacted when forms are completed: sonam  Phone: sonam  Form placed:  Blanche Aguilar

## 2024-03-29 NOTE — TELEPHONE ENCOUNTER
Forms completed by Blanche. Informed patient. Patient requested forms to be faxed to [phone], and copy mailed to home address. Forms faxed and mailed to home.

## 2024-04-07 ENCOUNTER — E-VISIT (OUTPATIENT)
Dept: FAMILY MEDICINE | Facility: CLINIC | Age: 49
End: 2024-04-07
Payer: COMMERCIAL

## 2024-04-07 DIAGNOSIS — F90.9 ATTENTION DEFICIT HYPERACTIVITY DISORDER (ADHD), UNSPECIFIED ADHD TYPE: Primary | ICD-10-CM

## 2024-04-07 DIAGNOSIS — K21.9 GASTROESOPHAGEAL REFLUX DISEASE, UNSPECIFIED WHETHER ESOPHAGITIS PRESENT: ICD-10-CM

## 2024-04-07 DIAGNOSIS — M62.830 BACK MUSCLE SPASM: ICD-10-CM

## 2024-04-07 PROCEDURE — 99207 PR NON-BILLABLE SERV PER CHARTING: CPT | Performed by: PHYSICIAN ASSISTANT

## 2024-04-08 RX ORDER — DEXTROAMPHETAMINE SACCHARATE, AMPHETAMINE ASPARTATE, DEXTROAMPHETAMINE SULFATE AND AMPHETAMINE SULFATE 1.25; 1.25; 1.25; 1.25 MG/1; MG/1; MG/1; MG/1
5 TABLET ORAL DAILY
Qty: 30 TABLET | Refills: 0 | Status: SHIPPED | OUTPATIENT
Start: 2024-06-21 | End: 2024-04-15

## 2024-04-08 RX ORDER — DEXTROAMPHETAMINE SACCHARATE, AMPHETAMINE ASPARTATE, DEXTROAMPHETAMINE SULFATE AND AMPHETAMINE SULFATE 5; 5; 5; 5 MG/1; MG/1; MG/1; MG/1
20 TABLET ORAL DAILY
Qty: 30 TABLET | Refills: 0 | Status: SHIPPED | OUTPATIENT
Start: 2024-05-22 | End: 2024-04-15

## 2024-04-08 RX ORDER — DEXTROAMPHETAMINE SACCHARATE, AMPHETAMINE ASPARTATE, DEXTROAMPHETAMINE SULFATE AND AMPHETAMINE SULFATE 5; 5; 5; 5 MG/1; MG/1; MG/1; MG/1
20 TABLET ORAL DAILY
Qty: 30 TABLET | Refills: 0 | Status: SHIPPED | OUTPATIENT
Start: 2024-06-21 | End: 2024-04-15

## 2024-04-08 RX ORDER — METHOCARBAMOL 500 MG/1
500-1000 TABLET, FILM COATED ORAL 3 TIMES DAILY PRN
Qty: 60 TABLET | Refills: 2 | Status: SHIPPED | OUTPATIENT
Start: 2024-04-08

## 2024-04-08 RX ORDER — DEXTROAMPHETAMINE SACCHARATE, AMPHETAMINE ASPARTATE, DEXTROAMPHETAMINE SULFATE AND AMPHETAMINE SULFATE 1.25; 1.25; 1.25; 1.25 MG/1; MG/1; MG/1; MG/1
5 TABLET ORAL DAILY
Qty: 30 TABLET | Refills: 0 | Status: SHIPPED | OUTPATIENT
Start: 2024-05-22 | End: 2024-04-15

## 2024-04-08 RX ORDER — NICOTINE POLACRILEX 4 MG/1
20-40 GUM, CHEWING ORAL DAILY
Qty: 180 TABLET | Refills: 1 | Status: SHIPPED | OUTPATIENT
Start: 2024-04-08 | End: 2024-09-30

## 2024-04-15 ENCOUNTER — MYC MEDICAL ADVICE (OUTPATIENT)
Dept: FAMILY MEDICINE | Facility: CLINIC | Age: 49
End: 2024-04-15
Payer: COMMERCIAL

## 2024-04-15 DIAGNOSIS — F90.9 ATTENTION DEFICIT HYPERACTIVITY DISORDER (ADHD), UNSPECIFIED ADHD TYPE: ICD-10-CM

## 2024-04-15 RX ORDER — DEXTROAMPHETAMINE SACCHARATE, AMPHETAMINE ASPARTATE, DEXTROAMPHETAMINE SULFATE AND AMPHETAMINE SULFATE 5; 5; 5; 5 MG/1; MG/1; MG/1; MG/1
20 TABLET ORAL DAILY
Qty: 30 TABLET | Refills: 0 | Status: SHIPPED | OUTPATIENT
Start: 2024-04-22 | End: 2024-10-07

## 2024-04-15 RX ORDER — DEXTROAMPHETAMINE SACCHARATE, AMPHETAMINE ASPARTATE, DEXTROAMPHETAMINE SULFATE AND AMPHETAMINE SULFATE 5; 5; 5; 5 MG/1; MG/1; MG/1; MG/1
20 TABLET ORAL DAILY
Qty: 30 TABLET | Refills: 0 | Status: SHIPPED | OUTPATIENT
Start: 2024-06-21 | End: 2024-05-10

## 2024-04-15 RX ORDER — DEXTROAMPHETAMINE SACCHARATE, AMPHETAMINE ASPARTATE, DEXTROAMPHETAMINE SULFATE AND AMPHETAMINE SULFATE 1.25; 1.25; 1.25; 1.25 MG/1; MG/1; MG/1; MG/1
5 TABLET ORAL DAILY
Qty: 30 TABLET | Refills: 0 | Status: SHIPPED | OUTPATIENT
Start: 2024-05-22 | End: 2024-05-01

## 2024-04-15 RX ORDER — DEXTROAMPHETAMINE SACCHARATE, AMPHETAMINE ASPARTATE, DEXTROAMPHETAMINE SULFATE AND AMPHETAMINE SULFATE 5; 5; 5; 5 MG/1; MG/1; MG/1; MG/1
20 TABLET ORAL DAILY
Qty: 30 TABLET | Refills: 0 | Status: SHIPPED | OUTPATIENT
Start: 2024-05-22 | End: 2024-05-10

## 2024-04-15 RX ORDER — DEXTROAMPHETAMINE SACCHARATE, AMPHETAMINE ASPARTATE, DEXTROAMPHETAMINE SULFATE AND AMPHETAMINE SULFATE 1.25; 1.25; 1.25; 1.25 MG/1; MG/1; MG/1; MG/1
5 TABLET ORAL DAILY
Qty: 30 TABLET | Refills: 0 | Status: SHIPPED | OUTPATIENT
Start: 2024-04-22 | End: 2024-10-07

## 2024-04-15 RX ORDER — DEXTROAMPHETAMINE SACCHARATE, AMPHETAMINE ASPARTATE, DEXTROAMPHETAMINE SULFATE AND AMPHETAMINE SULFATE 1.25; 1.25; 1.25; 1.25 MG/1; MG/1; MG/1; MG/1
5 TABLET ORAL DAILY
Qty: 30 TABLET | Refills: 0 | Status: SHIPPED | OUTPATIENT
Start: 2024-06-21 | End: 2024-05-01

## 2024-04-15 NOTE — TELEPHONE ENCOUNTER
See previous [facility] [place] message with concern about adderall:    So I noticed a few things on the Adderall scripts going forward.  The start dates are off unless something changed that I'm not aware of.  The first fill shows a start date of 5/22 with a second start date (really a fill date) of May 22.  Based on past months shouldn't that be either 5/20 & 5/22 or 5/22 & 5/24?  All the refills read this way.       The other thing is, am I taking a break from these medications that I just missed or misunderstood?  The current bottle I have from [employer] shows it filled on 3/21 clearly not be started until 3/23.  But the above date of 5/22 is the first one shown or is there one for April that I'm just not seeing on my end?     TJ      Patient feels the start and fill dates are off on his adderall.   I think his concern is the fill date is the same as the start date.   Likely he'd like to  the med before the day he runs out.    Routed to PCP to address.    Jade SOLO RN  [facility] Triage

## 2024-04-15 NOTE — TELEPHONE ENCOUNTER
Copying duplicate issue into the next [employer] message, closing this one.    Jade SOLO RN  [facility] Triage     done

## 2024-04-15 NOTE — TELEPHONE ENCOUNTER
Prescriptions resent with ok to fill 1-2 days early for convenience. Start date is when he takes his first dose  Can we check with the pharmacy that this is ok?

## 2024-04-16 NOTE — TELEPHONE ENCOUNTER
Called pharmacy, and they stated that the below orders per PCP are fine.    Sandra BSN RN  Triage Nurse  [facility]

## 2024-04-17 ENCOUNTER — MYC MEDICAL ADVICE (OUTPATIENT)
Dept: FAMILY MEDICINE | Facility: CLINIC | Age: 49
End: 2024-04-17
Payer: COMMERCIAL

## 2024-04-17 NOTE — TELEPHONE ENCOUNTER
Can we reach out to [facility]... each prescription states the med can be filled 1-2 days before the start date for convenience.   If he's to start the med on 4/22 he needs to pick it up the day before at least.

## 2024-04-17 NOTE — TELEPHONE ENCOUNTER
I contacted pharmacist  Seven at:  [id number] - [place], [place] - [address] [address]     Pharmacist confirmed that they are closed on 4/21/24.   He made a note that patient can  the following on 4/20/24:     Amphetamine-dextroamphetamine (ADDERALL) 5 MG tablet   Amphetamine-dextroamphetamine (ADDERALL) 20 MG tablet     MyChart message sent to patient informing him of this.     Rolanda Morales RN BSN  [facility]

## 2024-05-01 ENCOUNTER — E-VISIT (OUTPATIENT)
Dept: FAMILY MEDICINE | Facility: CLINIC | Age: 49
End: 2024-05-01
Payer: COMMERCIAL

## 2024-05-01 DIAGNOSIS — R53.83 FATIGUE, UNSPECIFIED TYPE: Primary | ICD-10-CM

## 2024-05-01 PROCEDURE — 99421 OL DIG E/M SVC 5-10 MIN: CPT | Performed by: PHYSICIAN ASSISTANT

## 2024-06-10 ENCOUNTER — MYC REFILL (OUTPATIENT)
Dept: FAMILY MEDICINE | Facility: CLINIC | Age: 49
End: 2024-06-10
Payer: COMMERCIAL

## 2024-06-10 DIAGNOSIS — F17.290 OTHER TOBACCO PRODUCT NICOTINE DEPENDENCE, UNCOMPLICATED: ICD-10-CM

## 2024-06-10 RX ORDER — BUPROPION HYDROCHLORIDE 150 MG/1
150 TABLET, FILM COATED, EXTENDED RELEASE ORAL 2 TIMES DAILY
Qty: 60 TABLET | Refills: 2 | Status: SHIPPED | OUTPATIENT
Start: 2024-06-10 | End: 2024-10-07

## 2024-07-11 ENCOUNTER — TELEPHONE (OUTPATIENT)
Dept: FAMILY MEDICINE | Facility: CLINIC | Age: 49
End: 2024-07-11
Payer: COMMERCIAL

## 2024-07-11 NOTE — TELEPHONE ENCOUNTER
Forms received from: [employer]   Phone number listed:    Fax listed: [phone]  Date received: 7/11/24  Form description: FMLA  Once forms are completed, please return to [facility] via fax.  Is patient requesting to be contacted when forms are completed: na  Phone: na  Form placed: Kalani in box

## 2024-07-11 NOTE — TELEPHONE ENCOUNTER
DARRONM to schedule follow up to go over FMLA paper work.      Kyra Subramanian   Lead    [facility]amy

## 2024-08-07 ENCOUNTER — VIRTUAL VISIT (OUTPATIENT)
Dept: FAMILY MEDICINE | Facility: CLINIC | Age: 49
End: 2024-08-07
Payer: COMMERCIAL

## 2024-08-07 DIAGNOSIS — M53.3 SI (SACROILIAC) JOINT DYSFUNCTION: ICD-10-CM

## 2024-08-07 DIAGNOSIS — M54.50 CHRONIC BILATERAL LOW BACK PAIN, UNSPECIFIED WHETHER SCIATICA PRESENT: Primary | Chronic | ICD-10-CM

## 2024-08-07 DIAGNOSIS — G89.29 CHRONIC BILATERAL LOW BACK PAIN, UNSPECIFIED WHETHER SCIATICA PRESENT: Primary | Chronic | ICD-10-CM

## 2024-08-07 PROCEDURE — 99442 PR PHYSICIAN TELEPHONE EVALUATION 11-20 MIN: CPT | Mod: 93 | Performed by: PHYSICIAN ASSISTANT

## 2024-08-07 ASSESSMENT — ANXIETY QUESTIONNAIRES
GAD7 TOTAL SCORE: 0
4. TROUBLE RELAXING: NOT AT ALL
IF YOU CHECKED OFF ANY PROBLEMS ON THIS QUESTIONNAIRE, HOW DIFFICULT HAVE THESE PROBLEMS MADE IT FOR YOU TO DO YOUR WORK, TAKE CARE OF THINGS AT HOME, OR GET ALONG WITH OTHER PEOPLE: NOT DIFFICULT AT ALL
6. BECOMING EASILY ANNOYED OR IRRITABLE: NOT AT ALL
5. BEING SO RESTLESS THAT IT IS HARD TO SIT STILL: NOT AT ALL
8. IF YOU CHECKED OFF ANY PROBLEMS, HOW DIFFICULT HAVE THESE MADE IT FOR YOU TO DO YOUR WORK, TAKE CARE OF THINGS AT HOME, OR GET ALONG WITH OTHER PEOPLE?: NOT DIFFICULT AT ALL
GAD7 TOTAL SCORE: 0
GAD7 TOTAL SCORE: 0
3. WORRYING TOO MUCH ABOUT DIFFERENT THINGS: NOT AT ALL
2. NOT BEING ABLE TO STOP OR CONTROL WORRYING: NOT AT ALL
7. FEELING AFRAID AS IF SOMETHING AWFUL MIGHT HAPPEN: NOT AT ALL
7. FEELING AFRAID AS IF SOMETHING AWFUL MIGHT HAPPEN: NOT AT ALL
1. FEELING NERVOUS, ANXIOUS, OR ON EDGE: NOT AT ALL

## 2024-08-07 ASSESSMENT — PATIENT HEALTH QUESTIONNAIRE - PHQ9
SUM OF ALL RESPONSES TO PHQ QUESTIONS 1-9: 1
SUM OF ALL RESPONSES TO PHQ QUESTIONS 1-9: 1
10. IF YOU CHECKED OFF ANY PROBLEMS, HOW DIFFICULT HAVE THESE PROBLEMS MADE IT FOR YOU TO DO YOUR WORK, TAKE CARE OF THINGS AT HOME, OR GET ALONG WITH OTHER PEOPLE: NOT DIFFICULT AT ALL

## 2024-08-07 NOTE — PROGRESS NOTES
"Abdias is a 48 year old who is being evaluated via a billable telephone visit.    What phone number would you like to be contacted at? [phone]  How would you like to obtain your AVS? [employer]  Originating Location (pt. Location): Home    Distant Location (provider location):  On-site    Assessment & Plan       ICD-10-CM    1. Chronic bilateral low back pain, unspecified whether sciatica present  M54.50     G89.29       2. SI (sacroiliac) joint dysfunction  M53.3           His chronic SI pain has been stable. [place] paperwork reviewed and completed, no changes from the last set of paperwork we did. Sounds as though we'll have to reassess every 4 months.      BMI  Estimated body mass index is 32.61 kg/m  as calculated from the following:    Height as of 10/2/23: 1.88 m (6' 2\").    Weight as of 10/2/23: 115.2 kg (254 lb).     Return in about 2 months (around 10/7/2024) for your annual medication check, repeat labs, with Blanche, in person.      Subjective   Abdias is a 48 year old, presenting for the following health issues:  Forms        8/7/2024     9:25 AM   Additional Questions   Roomed by Patient completed echeck in via [employer]     History of Present Illness       Reason for visit:  Forms    He eats 0-1 servings of fruits and vegetables daily.He consumes 2 sweetened beverage(s) daily.He exercises with enough effort to increase his heart rate 60 or more minutes per day.  He exercises with enough effort to increase his heart rate 5 days per week.   He is taking medications regularly.     [place] and now working just 1 job  Last SI joint injection was 1 year ago  Steroids have helped in the past for flares  Rarely uses Robaxin  Last prescription for tramadol #90 in May 2024        Review of Systems  Constitutional, neuro, musculoskeletal systems are negative, except as otherwise noted.      Objective           Vitals:  No vitals were obtained today due to virtual visit.    Physical Exam   General: Alert and no " distress //Respiratory: No audible wheeze, cough, or shortness of breath // Psychiatric:  Appropriate affect, tone, and pace of words          Phone call duration: 13 minutes  Signed Electronically by: Blanche Barrera PA-C

## 2024-09-29 ENCOUNTER — E-VISIT (OUTPATIENT)
Dept: FAMILY MEDICINE | Facility: CLINIC | Age: 49
End: 2024-09-29
Payer: COMMERCIAL

## 2024-09-29 ENCOUNTER — MYC REFILL (OUTPATIENT)
Dept: FAMILY MEDICINE | Facility: CLINIC | Age: 49
End: 2024-09-29

## 2024-09-29 DIAGNOSIS — M54.16 LUMBAR RADICULOPATHY: ICD-10-CM

## 2024-09-29 DIAGNOSIS — K21.9 GASTROESOPHAGEAL REFLUX DISEASE, UNSPECIFIED WHETHER ESOPHAGITIS PRESENT: ICD-10-CM

## 2024-09-29 DIAGNOSIS — G89.29 CHRONIC BILATERAL LOW BACK PAIN, UNSPECIFIED WHETHER SCIATICA PRESENT: Primary | Chronic | ICD-10-CM

## 2024-09-29 DIAGNOSIS — M54.50 CHRONIC BILATERAL LOW BACK PAIN, UNSPECIFIED WHETHER SCIATICA PRESENT: Primary | Chronic | ICD-10-CM

## 2024-09-29 PROCEDURE — 99421 OL DIG E/M SVC 5-10 MIN: CPT | Performed by: PHYSICIAN ASSISTANT

## 2024-09-30 RX ORDER — OMEPRAZOLE 20 MG/1
TABLET, DELAYED RELEASE ORAL
Qty: 180 TABLET | Refills: 0 | Status: SHIPPED | OUTPATIENT
Start: 2024-09-30

## 2024-09-30 RX ORDER — PREDNISONE 20 MG/1
TABLET ORAL
Qty: 11 TABLET | Refills: 0 | Status: SHIPPED | OUTPATIENT
Start: 2024-09-30

## 2024-10-02 SDOH — HEALTH STABILITY: PHYSICAL HEALTH: ON AVERAGE, HOW MANY MINUTES DO YOU ENGAGE IN EXERCISE AT THIS LEVEL?: 40 MIN

## 2024-10-02 SDOH — HEALTH STABILITY: PHYSICAL HEALTH: ON AVERAGE, HOW MANY DAYS PER WEEK DO YOU ENGAGE IN MODERATE TO STRENUOUS EXERCISE (LIKE A BRISK WALK)?: 3 DAYS

## 2024-10-02 ASSESSMENT — SOCIAL DETERMINANTS OF HEALTH (SDOH): HOW OFTEN DO YOU GET TOGETHER WITH FRIENDS OR RELATIVES?: PATIENT DECLINED

## 2024-10-06 ASSESSMENT — PATIENT HEALTH QUESTIONNAIRE - PHQ9
10. IF YOU CHECKED OFF ANY PROBLEMS, HOW DIFFICULT HAVE THESE PROBLEMS MADE IT FOR YOU TO DO YOUR WORK, TAKE CARE OF THINGS AT HOME, OR GET ALONG WITH OTHER PEOPLE: NOT DIFFICULT AT ALL
SUM OF ALL RESPONSES TO PHQ QUESTIONS 1-9: 1
SUM OF ALL RESPONSES TO PHQ QUESTIONS 1-9: 1

## 2024-10-07 ENCOUNTER — OFFICE VISIT (OUTPATIENT)
Dept: FAMILY MEDICINE | Facility: CLINIC | Age: 49
End: 2024-10-07
Payer: COMMERCIAL

## 2024-10-07 VITALS
RESPIRATION RATE: 24 BRPM | OXYGEN SATURATION: 98 % | WEIGHT: 277.2 LBS | HEART RATE: 120 BPM | SYSTOLIC BLOOD PRESSURE: 148 MMHG | DIASTOLIC BLOOD PRESSURE: 102 MMHG | BODY MASS INDEX: 36.74 KG/M2 | HEIGHT: 73 IN | TEMPERATURE: 97.7 F

## 2024-10-07 DIAGNOSIS — R53.83 FATIGUE, UNSPECIFIED TYPE: ICD-10-CM

## 2024-10-07 DIAGNOSIS — G89.29 CHRONIC BILATERAL LOW BACK PAIN, UNSPECIFIED WHETHER SCIATICA PRESENT: Chronic | ICD-10-CM

## 2024-10-07 DIAGNOSIS — Z00.00 ENCOUNTER FOR ROUTINE ADULT HEALTH EXAMINATION WITHOUT ABNORMAL FINDINGS: Primary | ICD-10-CM

## 2024-10-07 DIAGNOSIS — M54.50 CHRONIC BILATERAL LOW BACK PAIN, UNSPECIFIED WHETHER SCIATICA PRESENT: Chronic | ICD-10-CM

## 2024-10-07 DIAGNOSIS — Z00.00 ROUTINE GENERAL MEDICAL EXAMINATION AT A HEALTH CARE FACILITY: ICD-10-CM

## 2024-10-07 DIAGNOSIS — R53.83 FATIGUE: Primary | ICD-10-CM

## 2024-10-07 DIAGNOSIS — F51.04 INSOMNIA, PSYCHOPHYSIOLOGICAL: ICD-10-CM

## 2024-10-07 DIAGNOSIS — Z12.11 SCREEN FOR COLON CANCER: ICD-10-CM

## 2024-10-07 DIAGNOSIS — F90.9 ATTENTION DEFICIT HYPERACTIVITY DISORDER (ADHD), UNSPECIFIED ADHD TYPE: ICD-10-CM

## 2024-10-07 DIAGNOSIS — F11.90 CHRONIC, CONTINUOUS USE OF OPIOIDS: ICD-10-CM

## 2024-10-07 DIAGNOSIS — R03.0 ELEVATED BLOOD PRESSURE READING WITHOUT DIAGNOSIS OF HYPERTENSION: ICD-10-CM

## 2024-10-07 LAB — CREAT UR-MCNC: 47 MG/DL

## 2024-10-07 PROCEDURE — 90656 IIV3 VACC NO PRSV 0.5 ML IM: CPT | Performed by: PHYSICIAN ASSISTANT

## 2024-10-07 PROCEDURE — 90471 IMMUNIZATION ADMIN: CPT | Performed by: PHYSICIAN ASSISTANT

## 2024-10-07 PROCEDURE — 99214 OFFICE O/P EST MOD 30 MIN: CPT | Mod: 25 | Performed by: PHYSICIAN ASSISTANT

## 2024-10-07 PROCEDURE — G0481 DRUG TEST DEF 8-14 CLASSES: HCPCS | Performed by: PHYSICIAN ASSISTANT

## 2024-10-07 PROCEDURE — 99396 PREV VISIT EST AGE 40-64: CPT | Mod: 25 | Performed by: PHYSICIAN ASSISTANT

## 2024-10-07 ASSESSMENT — ANXIETY QUESTIONNAIRES
GAD7 TOTAL SCORE: 0
GAD7 TOTAL SCORE: 0
1. FEELING NERVOUS, ANXIOUS, OR ON EDGE: NOT AT ALL
7. FEELING AFRAID AS IF SOMETHING AWFUL MIGHT HAPPEN: NOT AT ALL
GAD7 TOTAL SCORE: 0
4. TROUBLE RELAXING: NOT AT ALL
7. FEELING AFRAID AS IF SOMETHING AWFUL MIGHT HAPPEN: NOT AT ALL
3. WORRYING TOO MUCH ABOUT DIFFERENT THINGS: NOT AT ALL
5. BEING SO RESTLESS THAT IT IS HARD TO SIT STILL: NOT AT ALL
2. NOT BEING ABLE TO STOP OR CONTROL WORRYING: NOT AT ALL
6. BECOMING EASILY ANNOYED OR IRRITABLE: NOT AT ALL
8. IF YOU CHECKED OFF ANY PROBLEMS, HOW DIFFICULT HAVE THESE MADE IT FOR YOU TO DO YOUR WORK, TAKE CARE OF THINGS AT HOME, OR GET ALONG WITH OTHER PEOPLE?: NOT DIFFICULT AT ALL
IF YOU CHECKED OFF ANY PROBLEMS ON THIS QUESTIONNAIRE, HOW DIFFICULT HAVE THESE PROBLEMS MADE IT FOR YOU TO DO YOUR WORK, TAKE CARE OF THINGS AT HOME, OR GET ALONG WITH OTHER PEOPLE: NOT DIFFICULT AT ALL

## 2024-10-07 NOTE — LETTER
Opioid / Opioid Plus Controlled Substance Agreement    This is an agreement between you and your provider about the safe and appropriate use of controlled substance/opioids prescribed by your care team. Controlled substances are medicines that can cause physical and mental dependence (abuse).    There are strict laws about having and using these medicines. We here at [facility] are committing to working with you in your efforts to get better. To support you in this work, we ll help you schedule regular office appointments for medicine refills. If we must cancel or change your appointment for any reason, we ll make sure you have enough medicine to last until your next appointment.     As a Provider, I will:  Listen carefully to your concerns and treat you with respect.   Recommend a treatment plan that I believe is in your best interest. This plan may involve therapies other than opioid pain medication.   Talk with you often about the possible benefits, and the risk of harm of any medicine that we prescribe for you.   Provide a plan on how to taper (discontinue or go off) using this medicine if the decision is made to stop its use.    As a Patient, I understand that opioid(s):   Are a controlled substance prescribed by my care team to help me function or work and manage my condition(s).   Are strong medicines and can cause serious side effects such as:  Drowsiness, which can seriously affect my driving ability  A lower breathing rate, enough to cause death  Harm to my thinking ability   Depression   Abuse of and addiction to this medicine  Need to be taken exactly as prescribed. Combining opioids with certain medicines or chemicals (such as illegal drugs, sedatives, sleeping pills, and benzodiazepines) can be dangerous or even fatal. If I stop opioids suddenly, I may have severe withdrawal symptoms.  Do not work for all types of pain nor for all patients. If they re not helpful, I may be asked to stop  them.    I am also being prescribed a stimulant controlled substance to help manage symptoms of attention deficit, appetite suppression, and/or fatigue.    The risks, benefits and side effects of these medicine(s) were explained to me. I agree that:  I will take part in other treatments as advised by my care team. This may be psychiatry or counseling, physical therapy, behavioral therapy, group treatment or a referral to a specialist.     I will keep all my appointments. I understand that this is part of the monitoring of opioids. My care team may require an office visit for EVERY opioid/controlled substance refill. If I miss appointments or don t follow instructions, my care team may stop my medicine.    I will take my medicines as prescribed. I will not change the dose or schedule unless my care team tells me to. There will be no refills if I run out early.     I may be asked to come to the clinic and complete a urine drug test or complete a pill count at any time. If I don t give a urine sample or participate in a pill count, the care team may stop my medicine.    I will only receive prescriptions from this clinic for chronic pain. If I am treated by another provider for acute pain issues, I will tell them that I am taking opioid pain medication for chronic pain and that I have a treatment agreement with this provider. I will inform my [facility] care team within one business day if I am given a prescription for any pain medication by another healthcare provider. My [facility] care team can contact other providers and pharmacists about my use of any medicines.    It is up to me to make sure that I don t run out of my medicines on weekends or holidays. If my care team is willing to refill my opioid prescription without a visit, I must request refills only during office hours. Refills may take up to 3 business days to process. I will use one pharmacy to fill all my opioid and other controlled  substance prescriptions. I will notify the clinic about any changes to my insurance or medication availability.    I am responsible for my prescriptions. If the medicine/prescription is lost, stolen or destroyed, it will not be replaced. I also agree not to share controlled substance medicines with anyone.    I am aware I should not use any illegal or recreational drugs. I agree not to drink alcohol unless my care team says I can.       If I enroll in the [place] [facility] Cann[facility] program, I will tell my care team prior to my next refill.     I will tell my care team right away if I become pregnant, have a new medical problem treated outside of my regular clinic, or have a change in my medications.    I understand that this medicine can affect my thinking, judgment and reaction time. Alcohol and drugs affect the brain and body, which can affect the safety of my driving. Being under the influence of alcohol or drugs can affect my decision-making, behaviors, personal safety, and the safety of others. Driving while impaired (DWI) can occur if a person is driving, operating, or in physical control of a car, motorcycle, boat, snowmobile, ATV, motorbike, off-road vehicle, or any other motor vehicle ([place] Statute 169A.20). I understand the risk if I choose to drive or operate any vehicle or machinery.    I understand that if I do not follow any of the conditions above, my prescriptions or treatment may be stopped or changed.          Opioids  What You Need to Know    What are opioids?   Opioids are pain medicines that must be prescribed by a doctor. They are also known as narcotics.     Examples are:   morphine (MS Contin, Cece)  oxycodone (Oxycontin)  oxycodone and acetaminophen (Percocet)  hydrocodone and acetaminophen (Vicodin, Norco)   fentanyl patch (Duragesic)   hydromorphone (Dilaudid)   methadone  codeine (Tylenol #3)     What do opioids do well?   Opioids are best for severe short-term pain such as after a  surgery or injury. They may work well for cancer pain. They may help some people with long-lasting (chronic) pain.     What do opioids NOT do well?   Opioids never get rid of pain entirely, and they don t work well for most patients with chronic pain. Opioids don t reduce swelling, one of the causes of pain.                                    Other ways to manage chronic pain and improve function include:     Treat the health problem that may be causing pain  Anti-inflammation medicines, which reduce swelling and tenderness, such as ibuprofen (Advil, Motrin) or naproxen (Aleve)  Acetaminophen (Tylenol)  Antidepressants and anti-seizure medicines, especially for nerve pain  Topical treatments such as patches or creams  Injections or nerve blocks  Chiropractic or osteopathic treatment  Acupuncture, massage, deep breathing, meditation, visual imagery, aromatherapy  Use heat or ice at the pain site  Physical therapy   Exercise  Stop smoking  Take part in therapy       Risks and side effects     Talk to your doctor before you start or decide to keep taking opioids. Possible side effects include:    Lowering your breathing rate enough to cause death  Overdose, including death, especially if taking higher than prescribed doses  Worse depression symptoms; less pleasure in things you usually enjoy  Feeling tired or sluggish  Slower thoughts or cloudy thinking  Being more sensitive to pain over time; pain is harder to control  Trouble sleeping or restless sleep  Changes in hormone levels (for example, less testosterone)  Changes in sex drive or ability to have sex  Constipation  Unsafe driving  Itching and sweating  Dizziness  Nausea, throwing up and dry mouth    What else should I know about opioids?    Opioids may lead to dependence, tolerance, or addiction.    Dependence means that if you stop or reduce the medicine too quickly, you will have withdrawal symptoms. These include loose poop (diarrhea), jitters, flu-like  symptoms, nervousness and tremors. Dependence is not the same as addiction.                     Tolerance means needing higher doses over time to get the same effect. This may increase the chance of serious side effects.    Addiction is when people improperly use a substance that harms their body, their mind or their relations with others. Use of opiates can cause a relapse of addiction if you have a history of drug or alcohol abuse.    People who have used opioids for a long time may have a lower quality of life, worse depression, higher levels of pain and more visits to doctors.    You can overdose on opioids. Take these steps to lower your risk of overdose:    Recognize the signs:  Signs of overdose include decrease or loss of consciousness (blackout), slowed breathing, trouble waking up and blue lips. If someone is worried about overdose, they should call 911.    Talk to your doctor about Narcan (naloxone).   If you are at risk for overdose, you may be given a prescription for Narcan. This medicine very quickly reverses the effects of opioids.   If you overdose, a friend or family member can give you Narcan while waiting for the ambulance. They need to know the signs of overdose and how to give Narcan.     Don't use alcohol or street drugs.   Taking them with opioids can cause death.    Do not take any of these medicines unless your doctor says it s OK. Taking these with opioids can cause death:  Benzodiazepines, such as lorazepam (Ativan), alprazolam (Xanax) or diazepam (Valium)  Muscle relaxers, such as cyclobenzaprine (Flexeril)  Sleeping pills like zolpidem (Ambien)   Other opioids      How to keep you and other people safe while taking opioids:    Never share your opioids with others.  Opioid medicines are regulated by the Drug Enforcement Agency ([employer]). Selling or sharing medications is a criminal act.    2. Be sure to store opioids in a secure place, locked up if possible. Young children can easily swallow  them and overdose.    3. When you are traveling with your medicines, keep them in the original bottles. If you use a pill box, be sure you also carry a copy of your medicine list from your clinic or pharmacy.    4. Safe disposal of opioids    Most pharmacies have places to get rid of medicine, called disposal kiosks. Medicine disposal options are also available in every [facility]. Search your county and  medication disposal  to find more options. You can find more details at:  [email or web address]://[email or web address]-green/[email or web address]-[email or web address]-medications     I agree that my provider, clinic care team, and pharmacy may work with any city, state or federal law enforcement agency that investigates the misuse, sale, or other diversion of my controlled medicine. I will allow my provider to discuss my care with, or share a copy of, this agreement with any other treating provider, pharmacy or emergency room where I receive care.    I have read this agreement and have asked questions about anything I did not understand.    _______________________________________________________  Patient Signature - Doug Galicia _____________________                   Date     _______________________________________________________  Provider Signature - Blanche Barrera PA-C   _____________________                   Date     _______________________________________________________  Witness Signature (required if provider not present while patient signing)   _____________________                   Date

## 2024-10-07 NOTE — PROGRESS NOTES
"Preventive Care Visit  [facility] ENRICO Barrera PA-C, Family Medicine  Oct 7, 2024      Assessment & Plan       ICD-10-CM    1. Encounter for routine adult health examination without abnormal findings  Z00.00 Hemoglobin A1c     Lipid panel reflex to direct LDL Fasting      2. Screen for colon cancer  Z12.11 Colonoscopy Screening  Referral      3. Chronic bilateral low back pain, unspecified whether sciatica present  M54.50     G89.29       4. Chronic, continuous use of opioids  F11.90       5. Attention deficit hyperactivity disorder (ADHD), unspecified ADHD type  F90.9       6. Insomnia, psychophysiological  F51.04       7. Elevated blood pressure reading without diagnosis of hypertension  R03.0            1,2) Screenings/preventative measures discussed    3-6) Conditions stable. Chronic pain follow up done in August. Routine UDS today. CSA reviewed and signed. Meds with refills on file, no changes today. Follow up in 6 months to reassess and for refills.    7) Reviewed common causes of high blood pressure. He will start monitoring at home. He will work on decreasing his caffeine intake and work on weight loss. Will reassess blood pressure in 6 months.    I was present with the student who participated in the service and in the documentation of the note. I have verified the history and personally performed the physical exam and medical decision making.   Blanche Barrera PA-C      BMI  Estimated body mass index is 36.57 kg/m  as calculated from the following:    Height as of this encounter: 1.854 m (6' 1\").    Weight as of this encounter: 125.7 kg (277 lb 3.2 oz).     Counseling  Appropriate preventive services were addressed with this patient via screening, questionnaire, or discussion as appropriate for fall prevention, nutrition, physical activity, Tobacco-use cessation, social engagement, weight loss and cognition.  Checklist reviewing preventive services available has been " given to the patient.  Reviewed patient's diet, addressing concerns and/or questions.   He is at risk for lack of exercise and has been provided with information to increase physical activity for the benefit of his well-being.   The patient was instructed to see the dentist every 6 months.     Return in about 6 months (around 4/7/2025) for tramadol and Adderall refills, BP follow up, with Blanche, in person.         Subjective   Abdias is a 48 year old, presenting for the following:  Physical        10/7/2024     7:44 AM   Additional Questions   Roomed by Oksana   Accompanied by sonam         10/7/2024     7:44 AM   Patient Reported Additional Medications   Patient reports taking the following new medications none        Health Care Directive  Patient does not have a Health Care Directive or Living Will: Discussed advance care planning with patient; information given to patient to review.    HPI  Please answer the questions below, rating yourself on each of the criteria shown using the scale on the right side of the page. As you answer each question, place an X in the box that best describes how you have felt and conducted yourself over the past 6 months.     Never Rarely Sometimes Often Very Often   1 How often do you have trouble wrapping up the final details of a project once the challenging parts have been done? x       2 How often do you have difficulty getting things in order when you have to do a task that requires organization? x       3 How often do you have problems remembering appointments or obligations? x       4 When you have a task that requires a lot of thought, how often do you avoid or delay getting started? x       5 How often do you fidget or squirm with your hands or feet when you have to sit down for a long time? x       6 How often do you feel overly active and compelled to do things, like you were driven by a motor? x       7 How often do you make careless mistakes when you have to work on a boring or  difficult project? x       8 How often do you have difficulty keeping your attention when you are doing boring or repetitive work? x       9 How often do you have difficulty concentrating on what people say to you, even when they are speaking to you directly? x       10 How often do you misplace or have difficulty finding things at home or at work? x       11 How often are you distracted by activity or noise around you? x       12 How often do you leave your seat in meetings or other situations in which you are expected to remain seated? x       13 How often do you feel restless or fidgety? x       14 How often do you have difficulty unwinding and relaxing when you have time to yourself? x       15 How often do you find yourself talking too much when you are in social situations? x       16 When you're in a conversation, how often do you find yourself finishing the sentences of the people you are talking to, before they can finish them themselves? x       17 How often do you have difficulty waiting your turn in situations when turn-taking is required? x       18 How often do you interrupt others when they are busy?  x         Chronic pain and ADHD follow up        10/2/2024   General Health   How would you rate your overall physical health? Good   Feel stress (tense, anxious, or unable to sleep) Not at all            10/2/2024   Nutrition   Three or more servings of calcium each day? Yes   Diet: Regular (no restrictions)   How many servings of fruit and vegetables per day? (!) 2-3   How many sweetened beverages each day? 0-1            10/2/2024   Exercise   Days per week of moderate/strenous exercise 3 days   Average minutes spent exercising at this level 40 min            10/2/2024   Social Factors   Frequency of gathering with friends or relatives Patient declined   Worry food won't last until get money to buy more No   Food not last or not have enough money for food? No   Do you have housing? (Housing is defined  as stable permanent housing and does not include staying ouside in a car, in a tent, in an abandoned building, in an overnight shelter, or couch-surfing.) Yes   Are you worried about losing your housing? No   Lack of transportation? No   Unable to get utilities (heat,electricity)? No            10/2/2024   Dental   Dentist two times every year? (!) NO            10/2/2024   TB Screening   Were you born outside of the [place]? No          Today's PHQ-9 Score:       10/6/2024     3:33 PM   PHQ-9 SCORE   PHQ-9 Total Score MyChart 1 (Minimal depression)   PHQ-9 Total Score 1         10/2/2024   Substance Use   Alcohol more than 3/day or more than 7/wk Not Applicable   Do you use any other substances recreationally? No        Social History     Tobacco Use    Smoking status: Former     Types: Dip, chew, snus or snuff     Passive exposure: Never    Smokeless tobacco: Current     Types: Chew    Tobacco comments:     Would LOVE to try any scrip I can get to aid in not using using smokless tabacco   Vaping Use    Vaping status: Never Used   Substance Use Topics    Alcohol use: No    Drug use: No           10/2/2024   STI Screening   New sexual partner(s) since last STI/HIV test? No      ASCVD Risk   The 10-year ASCVD risk score (Matthew DAMON, et al., 2019) is: 3.1%    Values used to calculate the score:      Age: 48 years      Sex: Male      Is Non- : No      Diabetic: No      Tobacco smoker: No      Systolic Blood Pressure: 148 mmHg      Is BP treated: No      HDL Cholesterol: 44 mg/dL      Total Cholesterol: 188 mg/dL        10/2/2024   Contraception/Family Planning   Questions about contraception or family planning No           Reviewed and updated as needed this visit by Provider                      Review of Systems  Constitutional, neuro, ENT, endocrine, pulmonary, cardiac, gastrointestinal, genitourinary, musculoskeletal, integument and psychiatric systems are negative, except as otherwise  "noted.     Objective    Exam  BP (!) 148/102   Pulse 120   Temp 97.7  F (36.5  C) (Temporal)   Resp 24   Ht 1.854 m (6' 1\")   Wt 125.7 kg (277 lb 3.2 oz)   SpO2 98%   BMI 36.57 kg/m     Estimated body mass index is 36.57 kg/m  as calculated from the following:    Height as of this encounter: 1.854 m (6' 1\").    Weight as of this encounter: 125.7 kg (277 lb 3.2 oz).    Physical Exam  GENERAL: alert and no distress  EYES: Eyes grossly normal to inspection  HENT: ear canals and TM's normal, nose and mouth without ulcers or lesions  NECK: no adenopathy, no asymmetry, masses, or scars  RESP: lungs clear to auscultation - no rales, rhonchi or wheezes  CV: regular rates and rhythm, normal S1 S2, no S3 or S4, and no murmur, click or rub  ABDOMEN: soft, nontender, without hepatosplenomegaly or masses  MS: no gross musculoskeletal defects noted, no edema  SKIN: no suspicious lesions or rashes  NEURO: Normal strength and tone, mentation intact and speech normal  PSYCH: mentation appears normal, affect normal/bright        Signed Electronically by: Blanche Barrera PA-C    "

## 2024-10-09 LAB
AMPHET UR CFM-MCNC: 2560 NG/ML
AMPHET/CREAT UR: 5447 NG/MG {CREAT}
N-NORTRAMADOL/CREAT UR CFM: ABNORMAL NG/MG {CREAT}
O-NORTRAMADOL UR CFM-MCNC: 6180 NG/ML
TRAMADOL CTO UR CFM-MCNC: 7820 NG/ML
TRAMADOL/CREAT UR: ABNORMAL NG/MG {CREAT}

## 2024-11-18 ENCOUNTER — LAB (OUTPATIENT)
Dept: LAB | Facility: CLINIC | Age: 49
End: 2024-11-18
Payer: COMMERCIAL

## 2024-11-18 DIAGNOSIS — R53.83 FATIGUE: ICD-10-CM

## 2024-11-18 DIAGNOSIS — Z00.00 ROUTINE GENERAL MEDICAL EXAMINATION AT A HEALTH CARE FACILITY: ICD-10-CM

## 2024-11-18 LAB
CHOLEST SERPL-MCNC: 182 MG/DL
ERYTHROCYTE [DISTWIDTH] IN BLOOD BY AUTOMATED COUNT: 12.1 % (ref 10–15)
EST. AVERAGE GLUCOSE BLD GHB EST-MCNC: 114 MG/DL
FASTING STATUS PATIENT QL REPORTED: YES
HBA1C MFR BLD: 5.6 % (ref 0–5.6)
HCT VFR BLD AUTO: 43.5 % (ref 40–53)
HDLC SERPL-MCNC: 40 MG/DL
HGB BLD-MCNC: 15.5 G/DL (ref 13.3–17.7)
LDLC SERPL CALC-MCNC: 92 MG/DL
MCH RBC QN AUTO: 30 PG (ref 26.5–33)
MCHC RBC AUTO-ENTMCNC: 35.6 G/DL (ref 31.5–36.5)
MCV RBC AUTO: 84 FL (ref 78–100)
NONHDLC SERPL-MCNC: 142 MG/DL
PLATELET # BLD AUTO: 389 10E3/UL (ref 150–450)
RBC # BLD AUTO: 5.17 10E6/UL (ref 4.4–5.9)
TRIGL SERPL-MCNC: 251 MG/DL
TSH SERPL DL<=0.005 MIU/L-ACNC: 2.66 UIU/ML (ref 0.3–4.2)
WBC # BLD AUTO: 7.2 10E3/UL (ref 4–11)

## 2024-11-18 PROCEDURE — 36415 COLL VENOUS BLD VENIPUNCTURE: CPT

## 2024-11-18 PROCEDURE — 84403 ASSAY OF TOTAL TESTOSTERONE: CPT

## 2024-11-18 PROCEDURE — 85027 COMPLETE CBC AUTOMATED: CPT

## 2024-11-18 PROCEDURE — 83036 HEMOGLOBIN GLYCOSYLATED A1C: CPT

## 2024-11-18 PROCEDURE — 84443 ASSAY THYROID STIM HORMONE: CPT

## 2024-11-18 PROCEDURE — 80061 LIPID PANEL: CPT

## 2024-11-20 DIAGNOSIS — R79.89 LOW TESTOSTERONE IN MALE: Primary | ICD-10-CM

## 2024-11-20 LAB — TESTOST SERPL-MCNC: 239 NG/DL (ref 240–950)

## 2024-11-25 ENCOUNTER — MYC REFILL (OUTPATIENT)
Dept: FAMILY MEDICINE | Facility: CLINIC | Age: 49
End: 2024-11-25
Payer: COMMERCIAL

## 2024-11-25 ENCOUNTER — TELEPHONE (OUTPATIENT)
Dept: FAMILY MEDICINE | Facility: CLINIC | Age: 49
End: 2024-11-25
Payer: COMMERCIAL

## 2024-11-25 DIAGNOSIS — M54.50 CHRONIC BILATERAL LOW BACK PAIN, UNSPECIFIED WHETHER SCIATICA PRESENT: ICD-10-CM

## 2024-11-25 DIAGNOSIS — G89.4 CHRONIC PAIN SYNDROME: Chronic | ICD-10-CM

## 2024-11-25 DIAGNOSIS — G89.29 CHRONIC BILATERAL LOW BACK PAIN, UNSPECIFIED WHETHER SCIATICA PRESENT: ICD-10-CM

## 2024-11-25 DIAGNOSIS — M53.3 SI (SACROILIAC) JOINT DYSFUNCTION: ICD-10-CM

## 2024-11-25 RX ORDER — TRAMADOL HYDROCHLORIDE 50 MG/1
50-100 TABLET ORAL EVERY 6 HOURS PRN
Qty: 90 TABLET | Refills: 0 | Status: SHIPPED | OUTPATIENT
Start: 2024-11-25

## 2024-11-25 NOTE — TELEPHONE ENCOUNTER
[facility] stating each year  they have to call PCP and do a Tier 1 exception. They need to ensure provider is aware patient is taking Adderall, Tramadol and Zolpidem and that you are ok with this?       Please advise,       Whit Arceo RN on 11/25/2024 at 12:15 PM

## 2024-11-25 NOTE — TELEPHONE ENCOUNTER
Requested Prescriptions   Pending Prescriptions Disp Refills     metFORMIN (GLUCOPHAGE-XR) 500 MG 24 hr tablet 60 tablet 0       There is no refill protocol information for this order        Please review refill request and send enough medication to get to appointment on 10/4/21. Thanks!    Rand Bertrand, RN     Yes, I am ok with these meds

## 2024-11-26 NOTE — TELEPHONE ENCOUNTER
RN called and spoke with pharmacist and relayed providers message. They had questions that needed to be answered on form such as dx codes for meds, past tx (pain clinic/injections), last visit with PCP date etc. RN was able to pull all this information from patients chart.      No further questions. Pharmacy will call back if needed.    Whit Arceo RN on 11/26/2024 at 1:03 PM

## 2024-12-09 ENCOUNTER — LAB (OUTPATIENT)
Dept: LAB | Facility: CLINIC | Age: 49
End: 2024-12-09
Payer: COMMERCIAL

## 2024-12-09 DIAGNOSIS — R79.89 LOW TESTOSTERONE IN MALE: ICD-10-CM

## 2024-12-09 LAB
FERRITIN SERPL-MCNC: 80 NG/ML (ref 31–409)
FSH SERPL IRP2-ACNC: 6.5 MIU/ML (ref 1.5–12.4)
LH SERPL-ACNC: 5.5 MIU/ML (ref 1.7–8.6)
PROLACTIN SERPL 3RD IS-MCNC: 12 NG/ML (ref 4–15)

## 2024-12-09 PROCEDURE — 83002 ASSAY OF GONADOTROPIN (LH): CPT

## 2024-12-09 PROCEDURE — 83001 ASSAY OF GONADOTROPIN (FSH): CPT

## 2024-12-09 PROCEDURE — 82728 ASSAY OF FERRITIN: CPT

## 2024-12-09 PROCEDURE — 36415 COLL VENOUS BLD VENIPUNCTURE: CPT

## 2024-12-09 PROCEDURE — 84146 ASSAY OF PROLACTIN: CPT

## 2025-01-13 ENCOUNTER — MYC MEDICAL ADVICE (OUTPATIENT)
Dept: FAMILY MEDICINE | Facility: CLINIC | Age: 50
End: 2025-01-13
Payer: COMMERCIAL

## 2025-03-16 ENCOUNTER — MYC MEDICAL ADVICE (OUTPATIENT)
Dept: FAMILY MEDICINE | Facility: CLINIC | Age: 50
End: 2025-03-16

## 2025-03-16 ENCOUNTER — E-VISIT (OUTPATIENT)
Dept: FAMILY MEDICINE | Facility: CLINIC | Age: 50
End: 2025-03-16
Payer: COMMERCIAL

## 2025-03-16 DIAGNOSIS — F90.9 ATTENTION DEFICIT HYPERACTIVITY DISORDER (ADHD), UNSPECIFIED ADHD TYPE: ICD-10-CM

## 2025-03-16 DIAGNOSIS — F41.9 ANXIETY: ICD-10-CM

## 2025-03-17 ENCOUNTER — LAB (OUTPATIENT)
Dept: LAB | Facility: CLINIC | Age: 50
End: 2025-03-17
Payer: COMMERCIAL

## 2025-03-17 DIAGNOSIS — E29.1 HYPOGONADISM MALE: ICD-10-CM

## 2025-03-17 DIAGNOSIS — I10 BENIGN ESSENTIAL HYPERTENSION WITH TARGET BLOOD PRESSURE BELOW 140/90: Primary | ICD-10-CM

## 2025-03-17 LAB
ANION GAP SERPL CALCULATED.3IONS-SCNC: 7 MMOL/L (ref 7–15)
BUN SERPL-MCNC: 16.4 MG/DL (ref 6–20)
CALCIUM SERPL-MCNC: 9.5 MG/DL (ref 8.8–10.4)
CHLORIDE SERPL-SCNC: 102 MMOL/L (ref 98–107)
CREAT SERPL-MCNC: 1.25 MG/DL (ref 0.67–1.17)
EGFRCR SERPLBLD CKD-EPI 2021: 71 ML/MIN/1.73M2
GLUCOSE SERPL-MCNC: 125 MG/DL (ref 70–99)
HCO3 SERPL-SCNC: 29 MMOL/L (ref 22–29)
POTASSIUM SERPL-SCNC: 5.1 MMOL/L (ref 3.4–5.3)
SODIUM SERPL-SCNC: 138 MMOL/L (ref 135–145)

## 2025-03-17 PROCEDURE — 80048 BASIC METABOLIC PNL TOTAL CA: CPT

## 2025-03-17 PROCEDURE — 36415 COLL VENOUS BLD VENIPUNCTURE: CPT

## 2025-03-17 RX ORDER — DEXTROAMPHETAMINE SACCHARATE, AMPHETAMINE ASPARTATE, DEXTROAMPHETAMINE SULFATE AND AMPHETAMINE SULFATE 2.5; 2.5; 2.5; 2.5 MG/1; MG/1; MG/1; MG/1
10 TABLET ORAL DAILY
Qty: 30 TABLET | Refills: 0 | Status: SHIPPED | OUTPATIENT
Start: 2025-05-17

## 2025-03-17 RX ORDER — DEXTROAMPHETAMINE SACCHARATE, AMPHETAMINE ASPARTATE, DEXTROAMPHETAMINE SULFATE AND AMPHETAMINE SULFATE 5; 5; 5; 5 MG/1; MG/1; MG/1; MG/1
20 TABLET ORAL DAILY
Qty: 30 TABLET | Refills: 0 | Status: SHIPPED | OUTPATIENT
Start: 2025-04-17

## 2025-03-17 RX ORDER — DEXTROAMPHETAMINE SACCHARATE, AMPHETAMINE ASPARTATE, DEXTROAMPHETAMINE SULFATE AND AMPHETAMINE SULFATE 5; 5; 5; 5 MG/1; MG/1; MG/1; MG/1
20 TABLET ORAL DAILY
Qty: 30 TABLET | Refills: 0 | Status: SHIPPED | OUTPATIENT
Start: 2025-05-17

## 2025-03-17 RX ORDER — DEXTROAMPHETAMINE SACCHARATE, AMPHETAMINE ASPARTATE, DEXTROAMPHETAMINE SULFATE AND AMPHETAMINE SULFATE 2.5; 2.5; 2.5; 2.5 MG/1; MG/1; MG/1; MG/1
10 TABLET ORAL DAILY
Qty: 30 TABLET | Refills: 0 | Status: SHIPPED | OUTPATIENT
Start: 2025-04-17

## 2025-03-17 ASSESSMENT — ANXIETY QUESTIONNAIRES
6. BECOMING EASILY ANNOYED OR IRRITABLE: SEVERAL DAYS
2. NOT BEING ABLE TO STOP OR CONTROL WORRYING: NOT AT ALL
8. IF YOU CHECKED OFF ANY PROBLEMS, HOW DIFFICULT HAVE THESE MADE IT FOR YOU TO DO YOUR WORK, TAKE CARE OF THINGS AT HOME, OR GET ALONG WITH OTHER PEOPLE?: SOMEWHAT DIFFICULT
5. BEING SO RESTLESS THAT IT IS HARD TO SIT STILL: NOT AT ALL
1. FEELING NERVOUS, ANXIOUS, OR ON EDGE: SEVERAL DAYS
7. FEELING AFRAID AS IF SOMETHING AWFUL MIGHT HAPPEN: NOT AT ALL
3. WORRYING TOO MUCH ABOUT DIFFERENT THINGS: SEVERAL DAYS
4. TROUBLE RELAXING: SEVERAL DAYS
GAD7 TOTAL SCORE: 4
IF YOU CHECKED OFF ANY PROBLEMS ON THIS QUESTIONNAIRE, HOW DIFFICULT HAVE THESE PROBLEMS MADE IT FOR YOU TO DO YOUR WORK, TAKE CARE OF THINGS AT HOME, OR GET ALONG WITH OTHER PEOPLE: SOMEWHAT DIFFICULT
7. FEELING AFRAID AS IF SOMETHING AWFUL MIGHT HAPPEN: NOT AT ALL
GAD7 TOTAL SCORE: 4

## 2025-03-17 NOTE — TELEPHONE ENCOUNTER
See MyChart message from patient.     No longer on Active medication list.     Rolanda SALCEDO  [facility]

## 2025-03-19 RX ORDER — HYDROXYZINE HYDROCHLORIDE 25 MG/1
12.5-5 TABLET, FILM COATED ORAL EVERY 6 HOURS PRN
Qty: 40 TABLET | Refills: 5 | Status: SHIPPED | OUTPATIENT
Start: 2025-03-19

## 2025-03-20 LAB — TESTOST SERPL-MCNC: 218 NG/DL (ref 240–950)

## 2025-04-01 ENCOUNTER — E-VISIT (OUTPATIENT)
Dept: FAMILY MEDICINE | Facility: CLINIC | Age: 50
End: 2025-04-01
Payer: COMMERCIAL

## 2025-04-01 DIAGNOSIS — G89.29 CHRONIC BILATERAL LOW BACK PAIN, UNSPECIFIED WHETHER SCIATICA PRESENT: ICD-10-CM

## 2025-04-01 DIAGNOSIS — M54.50 CHRONIC BILATERAL LOW BACK PAIN, UNSPECIFIED WHETHER SCIATICA PRESENT: ICD-10-CM

## 2025-04-01 RX ORDER — PREDNISONE 20 MG/1
40 TABLET ORAL DAILY
Qty: 10 TABLET | Refills: 0 | Status: SHIPPED | OUTPATIENT
Start: 2025-04-01

## 2025-04-07 ENCOUNTER — OFFICE VISIT (OUTPATIENT)
Dept: FAMILY MEDICINE | Facility: CLINIC | Age: 50
End: 2025-04-07
Attending: PHYSICIAN ASSISTANT
Payer: COMMERCIAL

## 2025-04-07 VITALS
SYSTOLIC BLOOD PRESSURE: 136 MMHG | RESPIRATION RATE: 18 BRPM | TEMPERATURE: 97.2 F | HEIGHT: 73 IN | HEART RATE: 107 BPM | WEIGHT: 269.8 LBS | DIASTOLIC BLOOD PRESSURE: 84 MMHG | BODY MASS INDEX: 35.76 KG/M2 | OXYGEN SATURATION: 100 %

## 2025-04-07 DIAGNOSIS — G89.4 CHRONIC PAIN SYNDROME: Chronic | ICD-10-CM

## 2025-04-07 DIAGNOSIS — F90.9 ATTENTION DEFICIT HYPERACTIVITY DISORDER (ADHD), UNSPECIFIED ADHD TYPE: Primary | ICD-10-CM

## 2025-04-07 DIAGNOSIS — I10 BENIGN ESSENTIAL HYPERTENSION WITH TARGET BLOOD PRESSURE BELOW 140/90: ICD-10-CM

## 2025-04-07 PROCEDURE — 99214 OFFICE O/P EST MOD 30 MIN: CPT | Performed by: PHYSICIAN ASSISTANT

## 2025-04-07 RX ORDER — DEXTROAMPHETAMINE SACCHARATE, AMPHETAMINE ASPARTATE, DEXTROAMPHETAMINE SULFATE AND AMPHETAMINE SULFATE 5; 5; 5; 5 MG/1; MG/1; MG/1; MG/1
20 TABLET ORAL DAILY
Qty: 30 TABLET | Refills: 0 | Status: CANCELLED | OUTPATIENT
Start: 2025-04-17

## 2025-04-07 RX ORDER — LOSARTAN POTASSIUM 50 MG/1
50 TABLET ORAL DAILY
Qty: 90 TABLET | Refills: 1 | Status: SHIPPED | OUTPATIENT
Start: 2025-04-07

## 2025-04-07 RX ORDER — DEXTROAMPHETAMINE SACCHARATE, AMPHETAMINE ASPARTATE, DEXTROAMPHETAMINE SULFATE AND AMPHETAMINE SULFATE 2.5; 2.5; 2.5; 2.5 MG/1; MG/1; MG/1; MG/1
10 TABLET ORAL DAILY
Qty: 30 TABLET | Refills: 0 | Status: SHIPPED | OUTPATIENT
Start: 2025-06-16

## 2025-04-07 RX ORDER — DEXTROAMPHETAMINE SACCHARATE, AMPHETAMINE ASPARTATE, DEXTROAMPHETAMINE SULFATE AND AMPHETAMINE SULFATE 2.5; 2.5; 2.5; 2.5 MG/1; MG/1; MG/1; MG/1
10 TABLET ORAL DAILY
Qty: 30 TABLET | Refills: 0 | Status: CANCELLED | OUTPATIENT
Start: 2025-05-17

## 2025-04-07 RX ORDER — DEXTROAMPHETAMINE SACCHARATE, AMPHETAMINE ASPARTATE, DEXTROAMPHETAMINE SULFATE AND AMPHETAMINE SULFATE 5; 5; 5; 5 MG/1; MG/1; MG/1; MG/1
20 TABLET ORAL DAILY
Qty: 30 TABLET | Refills: 0 | Status: SHIPPED | OUTPATIENT
Start: 2025-06-16

## 2025-04-07 NOTE — PROGRESS NOTES
"  Assessment & Plan       ICD-10-CM    1. Attention deficit hyperactivity disorder (ADHD), unspecified ADHD type  F90.9 amphetamine-dextroamphetamine (ADDERALL) 10 MG tablet     amphetamine-dextroamphetamine (ADDERALL) 20 MG tablet      2. Chronic pain syndrome  G89.4       3. Benign essential hypertension with target blood pressure below 140/90  I10 losartan (COZAAR) 50 MG tablet          1,2) Conditions stable. Med renewed, no changes. Has tramadol refill on file.     3) Blood pressure at goal. Med renewed, no changes.       BMI  Estimated body mass index is 35.6 kg/m  as calculated from the following:    Height as of this encounter: 1.854 m (6' 0.99\").    Weight as of this encounter: 122.4 kg (269 lb 12.8 oz).     Return in about 6 months (around 10/7/2025) for your annual physical, a med check, fasting labs, with Blanche, in person.      Kibmerly Calero is a 49 year old, presenting for the following health issues:  A.D.H.D      4/7/2025     9:10 AM   Additional Questions   Roomed by Oksana   Accompanied by sonam         4/7/2025     9:10 AM   Patient Reported Additional Medications   Patient reports taking the following new medications none     History of Present Illness       Reason for visit:  I am guessing it's a medication renew/check.He consumes 1 sweetened beverage(s) daily. He exercises with enough effort to increase his heart rate 4 days per week.   He is taking medications regularly.      Adult ADD/ADHD Medication Follow up    Since you last visit, how has your ADD/ADHD been? Stable  Any concerns regarding your current medication dosage? No  Are you having any new side effects from the medication? No  Any questions regarding your ADHD/ADD or medications? No       Medication Followup of tramadol  Taking Medication as prescribed: yes  Side Effects:  None  Medication Helping Symptoms:  yes    Was moving the last two days  Finished prednisone last week  No need to restart steroids          Review of " "Systems  Constitutional, MSK, psych systems are negative, except as otherwise noted.      Objective    /84   Pulse 107   Temp 97.2  F (36.2  C) (Temporal)   Resp 18   Ht 1.854 m (6' 0.99\")   Wt 122.4 kg (269 lb 12.8 oz)   SpO2 100%   BMI 35.60 kg/m    Body mass index is 35.6 kg/m .  Physical Exam   GENERAL: alert and no distress  MS: no gross musculoskeletal defects noted, no edema  SKIN: no suspicious lesions or rashes  NEURO: Normal strength and tone, mentation intact and speech normal  PSYCH: mentation appears normal, affect normal/bright          Signed Electronically by: Blanche Barrera PA-C    "

## 2025-04-25 ENCOUNTER — MYC MEDICAL ADVICE (OUTPATIENT)
Dept: FAMILY MEDICINE | Facility: CLINIC | Age: 50
End: 2025-04-25
Payer: COMMERCIAL

## 2025-05-01 NOTE — TELEPHONE ENCOUNTER
Forms Request    PCP listed: Blanche Barrera    Date of last visit: 4/7/25    Which provider to complete form: Blanche Barrera PANIMO    Type of form/letter: FMLA - Unknown      Is Release of Information needed?: Yes  Was an GIOVANI obtained?  Yes    Who is the form from? [employer]    When is form needed by:     How would you like the form returned: Fax to [phone]    Where was the form placed for completion: Blanche Barrera's in basket

## 2025-05-07 ENCOUNTER — MYC REFILL (OUTPATIENT)
Dept: FAMILY MEDICINE | Facility: CLINIC | Age: 50
End: 2025-05-07
Payer: COMMERCIAL

## 2025-05-07 DIAGNOSIS — I10 BENIGN ESSENTIAL HYPERTENSION WITH TARGET BLOOD PRESSURE BELOW 140/90: ICD-10-CM

## 2025-05-08 RX ORDER — LOSARTAN POTASSIUM 50 MG/1
50 TABLET ORAL DAILY
Qty: 90 TABLET | Refills: 1 | OUTPATIENT
Start: 2025-05-08

## 2025-05-29 ENCOUNTER — MYC MEDICAL ADVICE (OUTPATIENT)
Dept: FAMILY MEDICINE | Facility: CLINIC | Age: 50
End: 2025-05-29
Payer: COMMERCIAL

## 2025-06-11 ENCOUNTER — E-VISIT (OUTPATIENT)
Dept: FAMILY MEDICINE | Facility: CLINIC | Age: 50
End: 2025-06-11
Payer: COMMERCIAL

## 2025-06-11 DIAGNOSIS — G89.29 CHRONIC BILATERAL LOW BACK PAIN, UNSPECIFIED WHETHER SCIATICA PRESENT: Primary | ICD-10-CM

## 2025-06-11 DIAGNOSIS — M54.50 CHRONIC BILATERAL LOW BACK PAIN, UNSPECIFIED WHETHER SCIATICA PRESENT: Primary | ICD-10-CM

## 2025-06-11 RX ORDER — DICLOFENAC SODIUM 75 MG/1
75 TABLET, DELAYED RELEASE ORAL 2 TIMES DAILY
Qty: 14 TABLET | Refills: 0 | Status: SHIPPED | OUTPATIENT
Start: 2025-06-11 | End: 2025-06-18

## 2025-06-22 ENCOUNTER — MYC REFILL (OUTPATIENT)
Dept: FAMILY MEDICINE | Facility: CLINIC | Age: 50
End: 2025-06-22
Payer: COMMERCIAL

## 2025-06-22 DIAGNOSIS — K21.9 GASTROESOPHAGEAL REFLUX DISEASE, UNSPECIFIED WHETHER ESOPHAGITIS PRESENT: ICD-10-CM

## 2025-06-22 DIAGNOSIS — F90.9 ATTENTION DEFICIT HYPERACTIVITY DISORDER (ADHD), UNSPECIFIED ADHD TYPE: ICD-10-CM

## 2025-06-22 RX ORDER — DEXTROAMPHETAMINE SACCHARATE, AMPHETAMINE ASPARTATE, DEXTROAMPHETAMINE SULFATE AND AMPHETAMINE SULFATE 2.5; 2.5; 2.5; 2.5 MG/1; MG/1; MG/1; MG/1
10 TABLET ORAL DAILY
Qty: 30 TABLET | Refills: 0 | OUTPATIENT
Start: 2025-06-22

## 2025-06-22 RX ORDER — DEXTROAMPHETAMINE SACCHARATE, AMPHETAMINE ASPARTATE, DEXTROAMPHETAMINE SULFATE AND AMPHETAMINE SULFATE 5; 5; 5; 5 MG/1; MG/1; MG/1; MG/1
20 TABLET ORAL DAILY
Qty: 30 TABLET | Refills: 0 | OUTPATIENT
Start: 2025-06-22

## 2025-07-13 DIAGNOSIS — F41.9 ANXIETY: ICD-10-CM

## 2025-07-14 RX ORDER — HYDROXYZINE HYDROCHLORIDE 25 MG/1
TABLET, FILM COATED ORAL
Qty: 40 TABLET | Refills: 2 | Status: SHIPPED | OUTPATIENT
Start: 2025-07-14

## 2025-07-29 ENCOUNTER — MYC REFILL (OUTPATIENT)
Dept: FAMILY MEDICINE | Facility: CLINIC | Age: 50
End: 2025-07-29
Payer: COMMERCIAL

## 2025-07-29 DIAGNOSIS — G89.29 CHRONIC BILATERAL LOW BACK PAIN, UNSPECIFIED WHETHER SCIATICA PRESENT: ICD-10-CM

## 2025-07-29 DIAGNOSIS — M54.50 CHRONIC BILATERAL LOW BACK PAIN, UNSPECIFIED WHETHER SCIATICA PRESENT: ICD-10-CM

## 2025-07-30 RX ORDER — DICLOFENAC SODIUM 75 MG/1
75 TABLET, DELAYED RELEASE ORAL 2 TIMES DAILY PRN
Qty: 30 TABLET | Refills: 2 | Status: SHIPPED | OUTPATIENT
Start: 2025-07-30

## 2025-09-01 ENCOUNTER — MYC REFILL (OUTPATIENT)
Dept: FAMILY MEDICINE | Facility: CLINIC | Age: 50
End: 2025-09-01
Payer: COMMERCIAL

## 2025-09-01 DIAGNOSIS — F90.9 ATTENTION DEFICIT HYPERACTIVITY DISORDER (ADHD), UNSPECIFIED ADHD TYPE: ICD-10-CM

## 2025-09-01 RX ORDER — DEXTROAMPHETAMINE SACCHARATE, AMPHETAMINE ASPARTATE, DEXTROAMPHETAMINE SULFATE AND AMPHETAMINE SULFATE 5; 5; 5; 5 MG/1; MG/1; MG/1; MG/1
20 TABLET ORAL DAILY
Qty: 30 TABLET | Refills: 0 | OUTPATIENT
Start: 2025-09-01

## 2025-09-01 RX ORDER — DEXTROAMPHETAMINE SACCHARATE, AMPHETAMINE ASPARTATE, DEXTROAMPHETAMINE SULFATE AND AMPHETAMINE SULFATE 2.5; 2.5; 2.5; 2.5 MG/1; MG/1; MG/1; MG/1
10 TABLET ORAL DAILY
Qty: 30 TABLET | Refills: 0 | OUTPATIENT
Start: 2025-09-01